# Patient Record
Sex: MALE | Race: WHITE | Employment: OTHER | URBAN - NONMETROPOLITAN AREA
[De-identification: names, ages, dates, MRNs, and addresses within clinical notes are randomized per-mention and may not be internally consistent; named-entity substitution may affect disease eponyms.]

---

## 2017-01-04 ENCOUNTER — OFFICE VISIT (OUTPATIENT)
Dept: CARDIOLOGY | Age: 72
End: 2017-01-04

## 2017-01-04 VITALS
WEIGHT: 240 LBS | HEIGHT: 67 IN | HEART RATE: 55 BPM | SYSTOLIC BLOOD PRESSURE: 128 MMHG | BODY MASS INDEX: 37.67 KG/M2 | DIASTOLIC BLOOD PRESSURE: 82 MMHG

## 2017-01-04 DIAGNOSIS — R06.09 DYSPNEA ON EXERTION: ICD-10-CM

## 2017-01-04 DIAGNOSIS — E78.01 FAMILIAL HYPERCHOLESTEROLEMIA: ICD-10-CM

## 2017-01-04 DIAGNOSIS — I10 ESSENTIAL HYPERTENSION: ICD-10-CM

## 2017-01-04 DIAGNOSIS — I25.10 CORONARY ARTERY DISEASE INVOLVING NATIVE CORONARY ARTERY OF NATIVE HEART WITHOUT ANGINA PECTORIS: Primary | ICD-10-CM

## 2017-01-04 PROCEDURE — 93000 ELECTROCARDIOGRAM COMPLETE: CPT | Performed by: NUCLEAR MEDICINE

## 2017-01-04 PROCEDURE — 99214 OFFICE O/P EST MOD 30 MIN: CPT | Performed by: NUCLEAR MEDICINE

## 2017-01-04 RX ORDER — NABUMETONE 500 MG/1
500 TABLET, FILM COATED ORAL 2 TIMES DAILY
Status: ON HOLD | COMMUNITY
End: 2017-06-20 | Stop reason: ALTCHOICE

## 2017-01-04 RX ORDER — TAMSULOSIN HYDROCHLORIDE 0.4 MG/1
0.4 CAPSULE ORAL DAILY
COMMUNITY

## 2017-01-09 ENCOUNTER — PROCEDURE VISIT (OUTPATIENT)
Dept: CARDIOLOGY | Age: 72
End: 2017-01-09

## 2017-01-09 DIAGNOSIS — I48.0 PAROXYSMAL ATRIAL FIBRILLATION (HCC): ICD-10-CM

## 2017-01-09 DIAGNOSIS — R55 VASOVAGAL SYNCOPE: ICD-10-CM

## 2017-01-09 DIAGNOSIS — R00.1 BRADYCARDIA: Primary | ICD-10-CM

## 2017-01-09 PROCEDURE — 93298 REM INTERROG DEV EVAL SCRMS: CPT | Performed by: NUCLEAR MEDICINE

## 2017-01-12 ENCOUNTER — TELEPHONE (OUTPATIENT)
Dept: CARDIOLOGY | Age: 72
End: 2017-01-12

## 2017-01-13 ENCOUNTER — TELEPHONE (OUTPATIENT)
Dept: CARDIOLOGY | Age: 72
End: 2017-01-13

## 2017-01-26 RX ORDER — VALSARTAN AND HYDROCHLOROTHIAZIDE 160; 12.5 MG/1; MG/1
1 TABLET, FILM COATED ORAL DAILY
Qty: 30 TABLET | Refills: 5 | Status: SHIPPED | OUTPATIENT
Start: 2017-01-26 | End: 2017-06-09 | Stop reason: SDUPTHER

## 2017-02-10 ENCOUNTER — PROCEDURE VISIT (OUTPATIENT)
Dept: CARDIOLOGY | Age: 72
End: 2017-02-10

## 2017-02-10 DIAGNOSIS — Z45.09 ENCOUNTER FOR ELECTRONIC ANALYSIS OF REVEAL EVENT RECORDER: Primary | ICD-10-CM

## 2017-02-10 PROCEDURE — 93298 REM INTERROG DEV EVAL SCRMS: CPT | Performed by: NUCLEAR MEDICINE

## 2017-03-24 ENCOUNTER — PROCEDURE VISIT (OUTPATIENT)
Dept: CARDIOLOGY | Age: 72
End: 2017-03-24

## 2017-03-24 DIAGNOSIS — Z45.09 ENCOUNTER FOR ELECTRONIC ANALYSIS OF REVEAL EVENT RECORDER: Primary | ICD-10-CM

## 2017-03-24 PROCEDURE — 93298 REM INTERROG DEV EVAL SCRMS: CPT | Performed by: NUCLEAR MEDICINE

## 2017-05-26 RX ORDER — POTASSIUM CHLORIDE 1500 MG/1
TABLET, EXTENDED RELEASE ORAL
Qty: 180 TABLET | Refills: 1 | Status: SHIPPED | OUTPATIENT
Start: 2017-05-26 | End: 2017-11-11 | Stop reason: SDUPTHER

## 2017-06-05 ENCOUNTER — TELEPHONE (OUTPATIENT)
Dept: CARDIOLOGY | Age: 72
End: 2017-06-05

## 2017-06-05 DIAGNOSIS — Z45.09 ENCOUNTER FOR ELECTRONIC ANALYSIS OF REVEAL EVENT RECORDER: Primary | ICD-10-CM

## 2017-06-09 RX ORDER — VALSARTAN AND HYDROCHLOROTHIAZIDE 160; 12.5 MG/1; MG/1
1 TABLET, FILM COATED ORAL DAILY
Qty: 30 TABLET | Refills: 5 | Status: SHIPPED | OUTPATIENT
Start: 2017-06-09 | End: 2018-01-05 | Stop reason: SDUPTHER

## 2017-06-27 ENCOUNTER — PROCEDURE VISIT (OUTPATIENT)
Dept: CARDIOLOGY | Age: 72
End: 2017-06-27

## 2017-06-27 DIAGNOSIS — Z45.09 ENCOUNTER FOR ELECTRONIC ANALYSIS OF REVEAL EVENT RECORDER: Primary | ICD-10-CM

## 2017-07-19 ENCOUNTER — TELEPHONE (OUTPATIENT)
Dept: CARDIOLOGY CLINIC | Age: 72
End: 2017-07-19

## 2017-08-31 ENCOUNTER — OFFICE VISIT (OUTPATIENT)
Dept: CARDIOLOGY CLINIC | Age: 72
End: 2017-08-31
Payer: MEDICARE

## 2017-08-31 VITALS
DIASTOLIC BLOOD PRESSURE: 84 MMHG | WEIGHT: 231.1 LBS | HEIGHT: 67 IN | HEART RATE: 75 BPM | BODY MASS INDEX: 36.27 KG/M2 | SYSTOLIC BLOOD PRESSURE: 134 MMHG

## 2017-08-31 DIAGNOSIS — E78.01 FAMILIAL HYPERCHOLESTEROLEMIA: ICD-10-CM

## 2017-08-31 DIAGNOSIS — I25.10 CORONARY ARTERY DISEASE INVOLVING NATIVE CORONARY ARTERY OF NATIVE HEART WITHOUT ANGINA PECTORIS: Primary | ICD-10-CM

## 2017-08-31 DIAGNOSIS — I10 ESSENTIAL HYPERTENSION: ICD-10-CM

## 2017-08-31 PROCEDURE — 99213 OFFICE O/P EST LOW 20 MIN: CPT | Performed by: NUCLEAR MEDICINE

## 2017-08-31 PROCEDURE — 93000 ELECTROCARDIOGRAM COMPLETE: CPT | Performed by: NUCLEAR MEDICINE

## 2017-08-31 RX ORDER — CLOPIDOGREL BISULFATE 75 MG/1
TABLET ORAL
Qty: 90 TABLET | Refills: 1 | Status: SHIPPED | OUTPATIENT
Start: 2017-08-31 | End: 2018-01-03 | Stop reason: SDUPTHER

## 2017-08-31 RX ORDER — SOTALOL HYDROCHLORIDE 80 MG/1
TABLET ORAL
Qty: 180 TABLET | Refills: 1 | Status: SHIPPED | OUTPATIENT
Start: 2017-08-31 | End: 2017-08-31 | Stop reason: SDUPTHER

## 2017-08-31 RX ORDER — PRAVASTATIN SODIUM 80 MG/1
TABLET ORAL
Qty: 90 TABLET | Refills: 1 | Status: SHIPPED | OUTPATIENT
Start: 2017-08-31 | End: 2018-01-03 | Stop reason: SDUPTHER

## 2017-11-13 RX ORDER — POTASSIUM CHLORIDE 1500 MG/1
TABLET, EXTENDED RELEASE ORAL
Qty: 180 TABLET | Refills: 1 | Status: SHIPPED | OUTPATIENT
Start: 2017-11-13 | End: 2018-01-03 | Stop reason: SDUPTHER

## 2018-01-03 ENCOUNTER — OFFICE VISIT (OUTPATIENT)
Dept: CARDIOLOGY CLINIC | Age: 73
End: 2018-01-03
Payer: MEDICARE

## 2018-01-03 VITALS
HEIGHT: 66 IN | SYSTOLIC BLOOD PRESSURE: 122 MMHG | BODY MASS INDEX: 37.13 KG/M2 | HEART RATE: 64 BPM | WEIGHT: 231.04 LBS | DIASTOLIC BLOOD PRESSURE: 84 MMHG

## 2018-01-03 DIAGNOSIS — I25.10 CORONARY ARTERY DISEASE INVOLVING NATIVE CORONARY ARTERY OF NATIVE HEART WITHOUT ANGINA PECTORIS: Primary | ICD-10-CM

## 2018-01-03 DIAGNOSIS — I73.9 PVD (PERIPHERAL VASCULAR DISEASE) (HCC): ICD-10-CM

## 2018-01-03 DIAGNOSIS — E78.01 FAMILIAL HYPERCHOLESTEROLEMIA: ICD-10-CM

## 2018-01-03 DIAGNOSIS — I10 ESSENTIAL HYPERTENSION: ICD-10-CM

## 2018-01-03 PROCEDURE — 99214 OFFICE O/P EST MOD 30 MIN: CPT | Performed by: NUCLEAR MEDICINE

## 2018-01-03 RX ORDER — POTASSIUM CHLORIDE 20 MEQ/1
TABLET, EXTENDED RELEASE ORAL
Qty: 180 TABLET | Refills: 3 | Status: SHIPPED | OUTPATIENT
Start: 2018-01-03 | End: 2019-03-04 | Stop reason: SDUPTHER

## 2018-01-03 RX ORDER — CLOPIDOGREL BISULFATE 75 MG/1
TABLET ORAL
Qty: 90 TABLET | Refills: 3 | Status: SHIPPED | OUTPATIENT
Start: 2018-01-03 | End: 2019-03-04 | Stop reason: SDUPTHER

## 2018-01-03 RX ORDER — PRAVASTATIN SODIUM 80 MG/1
TABLET ORAL
Qty: 90 TABLET | Refills: 3 | Status: SHIPPED | OUTPATIENT
Start: 2018-01-03 | End: 2019-03-04 | Stop reason: SDUPTHER

## 2018-01-03 NOTE — PROGRESS NOTES
kg)   BMI 37.29 kg/m²   General:   Well developed, well nourished  Lungs:    Clear to auscultation  Heart:    Normal S1 S2, Slight murmur. no rubs, no gallops  Abdomen:   Soft, non tender, no organomegalies, positive bowel sounds  Extremities:   No edema, no cyanosis, good peripheral pulses  Neurological:   Awake, alert, oriented. No obvious focal deficits  Musculoskelatal:  No obvious deformities    Assessment:     1. Coronary artery disease involving native coronary artery of native heart without angina pectoris     2. Essential hypertension     3. Familial hypercholesterolemia     possible PVD  Higher risk for CAD  Might need a back surgery   ? ? PVD       Plan:  No Follow-up on file. Consider a follow up stress test if there is a plan for back surgery   Check AMOS   Continue risk factor modification and medical management  Thank you for allowing me to participate in the care of your patient. Please don't hesitate to contact me regarding any further issues related to the patient care      Orders Placed:  No orders of the defined types were placed in this encounter. Medications Prescribed:  No orders of the defined types were placed in this encounter. Discussed use, benefit, and side effects of prescribed medications. All patient questions answered. Pt voiced understanding. Instructed to continue current medications, diet and exercise. Continue risk factor modification and medical management. Patient agreed with treatment plan. Follow up as directed.     Electronically signed by Anand Silverman MD on 1/3/2018 at 11:52 AM

## 2018-01-05 ENCOUNTER — PROCEDURE VISIT (OUTPATIENT)
Dept: CARDIOLOGY CLINIC | Age: 73
End: 2018-01-05

## 2018-01-05 DIAGNOSIS — I73.9 CLAUDICATION (HCC): Primary | ICD-10-CM

## 2018-01-05 RX ORDER — SOTALOL HYDROCHLORIDE 80 MG/1
40 TABLET ORAL 2 TIMES DAILY
Qty: 180 TABLET | Refills: 3 | Status: SHIPPED | OUTPATIENT
Start: 2018-01-05 | End: 2019-03-04 | Stop reason: SDUPTHER

## 2018-01-05 RX ORDER — VALSARTAN AND HYDROCHLOROTHIAZIDE 160; 12.5 MG/1; MG/1
1 TABLET, FILM COATED ORAL DAILY
Qty: 90 TABLET | Refills: 3 | Status: SHIPPED | OUTPATIENT
Start: 2018-01-05 | End: 2018-07-31 | Stop reason: ALTCHOICE

## 2018-01-23 ENCOUNTER — TELEPHONE (OUTPATIENT)
Dept: CARDIOLOGY CLINIC | Age: 73
End: 2018-01-23

## 2018-01-23 DIAGNOSIS — I73.9 PVD (PERIPHERAL VASCULAR DISEASE) (HCC): Primary | ICD-10-CM

## 2018-07-31 RX ORDER — IRBESARTAN AND HYDROCHLOROTHIAZIDE 150; 12.5 MG/1; MG/1
1 TABLET, FILM COATED ORAL DAILY
Qty: 90 TABLET | Refills: 1 | Status: SHIPPED | OUTPATIENT
Start: 2018-07-31 | End: 2018-11-06

## 2018-09-05 ENCOUNTER — OFFICE VISIT (OUTPATIENT)
Dept: CARDIOLOGY CLINIC | Age: 73
End: 2018-09-05
Payer: MEDICARE

## 2018-09-05 VITALS
WEIGHT: 238 LBS | HEART RATE: 86 BPM | HEIGHT: 66 IN | BODY MASS INDEX: 38.25 KG/M2 | DIASTOLIC BLOOD PRESSURE: 68 MMHG | SYSTOLIC BLOOD PRESSURE: 124 MMHG

## 2018-09-05 DIAGNOSIS — I48.0 PAROXYSMAL ATRIAL FIBRILLATION (HCC): Primary | ICD-10-CM

## 2018-09-05 DIAGNOSIS — E78.01 FAMILIAL HYPERCHOLESTEROLEMIA: ICD-10-CM

## 2018-09-05 DIAGNOSIS — I10 ESSENTIAL HYPERTENSION: ICD-10-CM

## 2018-09-05 DIAGNOSIS — I25.10 CORONARY ARTERY DISEASE INVOLVING NATIVE CORONARY ARTERY OF NATIVE HEART WITHOUT ANGINA PECTORIS: ICD-10-CM

## 2018-09-05 PROCEDURE — 93000 ELECTROCARDIOGRAM COMPLETE: CPT | Performed by: NUCLEAR MEDICINE

## 2018-09-05 PROCEDURE — 99213 OFFICE O/P EST LOW 20 MIN: CPT | Performed by: NUCLEAR MEDICINE

## 2018-09-05 NOTE — PROGRESS NOTES
[address] CARDIOLOGY  [place] [address] [address]  [place] [address]  Dept: [phone]  Dept Fax: [phone]  Loc: [phone]    Visit Date: 9/5/2018    Jonathan Caban is a 72 y.o. male who presents today for:  Chief Complaint   Patient presents with    Follow-up    Cardiac Clearance    Hypertension    Coronary Artery Disease    Atrial Fibrillation    Hyperlipidemia   dealing with aches pain   Cant get to do better  Back issues  Limited by that   Does have CAD  Cath 2013  Diffuse CAD  No chest pain   Does have dyspnea   No changes in breathing  Deconditioning     HPI:  HPI  Past Medical History:   Diagnosis Date    Anxiety     Bradycardia 4/8/2016    Heart disease     Hyperlipidemia     Hypertension     Medtronic linq loop recorder 4/8/2016    Paroxysmal atrial fibrillation ([address] 9/23/2015    Spondylolisthesis     Syncopal episodes 9/23/2015      Past Surgical History:   Procedure Laterality Date    ANGIOPLASTY  07' 10'    x5    BACK SURGERY  2008    CHOLECYSTECTOMY      CORONARY ANGIOPLASTY WITH STENT PLACEMENT  9612,0298    EYE SURGERY      EYE SURGERY      HERNIA REPAIR      KNEE ARTHROSCOPY  1990    TONSILLECTOMY      TUR  2015     Family History   Problem Relation Age of Onset    Heart Disease Mother 62        CABG    High Cholesterol Mother     Cancer Mother     Heart Disease Father 61        stents    Cancer Father     Asthma Brother      Social History   Substance Use Topics    Smoking status: Former Smoker     Quit date: 6/27/2001    Smokeless tobacco: Never Used    Alcohol use Yes      Comment: couple beers a night      Current Outpatient Prescriptions   Medication Sig Dispense Refill    irbesartan-hydrochlorothiazide (AVALIDE) 150-12.5 MG per tablet Take 1 tablet by mouth daily 90 tablet 1    sotalol (BETAPACE) 80 MG tablet Take 0.5 tablets by mouth 2 times daily 180 tablet 3    potassium chloride (KLOR-CON M20)

## 2018-10-05 ENCOUNTER — TELEPHONE (OUTPATIENT)
Dept: CARDIOLOGY CLINIC | Age: 73
End: 2018-10-05

## 2018-10-05 NOTE — TELEPHONE ENCOUNTER
Pre op Risk Assessment    Procedure left medial meniscectomy   Physician [facility]   Date of surgery/procedure 10/24/18    Last OV 9/5/18  Last Stress 1/12/17  Last Echo 1/12/17  Last Cath 6/27/13  Last Stent none in chart   Is patient on blood thinners plavix asa  Hold Meds/how many days ?

## 2018-11-06 RX ORDER — LOSARTAN POTASSIUM AND HYDROCHLOROTHIAZIDE 25; 100 MG/1; MG/1
1 TABLET ORAL DAILY
Qty: 30 TABLET | Refills: 2 | Status: SHIPPED | OUTPATIENT
Start: 2018-11-06 | End: 2018-12-26 | Stop reason: SDUPTHER

## 2018-11-06 RX ORDER — LOSARTAN POTASSIUM AND HYDROCHLOROTHIAZIDE 25; 100 MG/1; MG/1
1 TABLET ORAL DAILY
COMMUNITY
End: 2018-11-06 | Stop reason: SDUPTHER

## 2018-12-26 RX ORDER — LOSARTAN POTASSIUM AND HYDROCHLOROTHIAZIDE 25; 100 MG/1; MG/1
1 TABLET ORAL DAILY
Qty: 30 TABLET | Refills: 9 | Status: SHIPPED | OUTPATIENT
Start: 2018-12-26 | End: 2019-03-15 | Stop reason: SDUPTHER

## 2019-03-04 RX ORDER — CLOPIDOGREL BISULFATE 75 MG/1
TABLET ORAL
Qty: 90 TABLET | Refills: 3 | Status: SHIPPED | OUTPATIENT
Start: 2019-03-04 | End: 2019-03-15 | Stop reason: SDUPTHER

## 2019-03-04 RX ORDER — POTASSIUM CHLORIDE 20 MEQ/1
TABLET, EXTENDED RELEASE ORAL
Qty: 180 TABLET | Refills: 3 | Status: SHIPPED | OUTPATIENT
Start: 2019-03-04 | End: 2019-03-15 | Stop reason: SDUPTHER

## 2019-03-04 RX ORDER — SOTALOL HYDROCHLORIDE 80 MG/1
TABLET ORAL
Qty: 90 TABLET | Refills: 7 | Status: SHIPPED | OUTPATIENT
Start: 2019-03-04 | End: 2019-03-15 | Stop reason: SDUPTHER

## 2019-03-04 RX ORDER — PRAVASTATIN SODIUM 80 MG/1
TABLET ORAL
Qty: 90 TABLET | Refills: 3 | Status: SHIPPED | OUTPATIENT
Start: 2019-03-04 | End: 2019-03-15 | Stop reason: SDUPTHER

## 2019-03-15 ENCOUNTER — TELEPHONE (OUTPATIENT)
Dept: CARDIOLOGY CLINIC | Age: 74
End: 2019-03-15

## 2019-03-15 RX ORDER — CLOPIDOGREL BISULFATE 75 MG/1
TABLET ORAL
Qty: 90 TABLET | Refills: 3 | Status: SHIPPED | OUTPATIENT
Start: 2019-03-15 | End: 2020-06-02

## 2019-03-15 RX ORDER — NITROGLYCERIN 0.4 MG/1
0.4 TABLET SUBLINGUAL EVERY 5 MIN PRN
Qty: 25 TABLET | Refills: 1 | Status: SHIPPED | OUTPATIENT
Start: 2019-03-15

## 2019-03-15 RX ORDER — SOTALOL HYDROCHLORIDE 80 MG/1
TABLET ORAL
Qty: 90 TABLET | Refills: 7 | Status: SHIPPED | OUTPATIENT
Start: 2019-03-15 | End: 2020-08-18

## 2019-03-15 RX ORDER — PRAVASTATIN SODIUM 80 MG/1
TABLET ORAL
Qty: 90 TABLET | Refills: 3 | Status: SHIPPED | OUTPATIENT
Start: 2019-03-15 | End: 2020-06-02

## 2019-03-15 RX ORDER — POTASSIUM CHLORIDE 20 MEQ/1
TABLET, EXTENDED RELEASE ORAL
Qty: 180 TABLET | Refills: 3 | Status: SHIPPED | OUTPATIENT
Start: 2019-03-15 | End: 2020-06-02

## 2019-03-15 RX ORDER — LOSARTAN POTASSIUM AND HYDROCHLOROTHIAZIDE 25; 100 MG/1; MG/1
1 TABLET ORAL DAILY
Qty: 30 TABLET | Refills: 9 | Status: SHIPPED | OUTPATIENT
Start: 2019-03-15 | End: 2019-10-14 | Stop reason: ALTCHOICE

## 2019-03-15 RX ORDER — LOSARTAN POTASSIUM AND HYDROCHLOROTHIAZIDE 25; 100 MG/1; MG/1
1 TABLET ORAL DAILY
Qty: 30 TABLET | Refills: 9 | Status: CANCELLED | OUTPATIENT
Start: 2019-03-15

## 2019-09-04 ENCOUNTER — OFFICE VISIT (OUTPATIENT)
Dept: CARDIOLOGY CLINIC | Age: 74
End: 2019-09-04
Payer: MEDICARE

## 2019-09-04 VITALS
SYSTOLIC BLOOD PRESSURE: 114 MMHG | HEIGHT: 66 IN | BODY MASS INDEX: 38.25 KG/M2 | DIASTOLIC BLOOD PRESSURE: 82 MMHG | HEART RATE: 73 BPM | WEIGHT: 238 LBS

## 2019-09-04 DIAGNOSIS — I10 ESSENTIAL HYPERTENSION: ICD-10-CM

## 2019-09-04 DIAGNOSIS — R07.2 PRECORDIAL PAIN: ICD-10-CM

## 2019-09-04 DIAGNOSIS — E78.01 FAMILIAL HYPERCHOLESTEROLEMIA: ICD-10-CM

## 2019-09-04 DIAGNOSIS — I25.10 CORONARY ARTERY DISEASE INVOLVING NATIVE CORONARY ARTERY OF NATIVE HEART WITHOUT ANGINA PECTORIS: Primary | ICD-10-CM

## 2019-09-04 PROCEDURE — 99214 OFFICE O/P EST MOD 30 MIN: CPT | Performed by: NUCLEAR MEDICINE

## 2019-09-04 PROCEDURE — 93000 ELECTROCARDIOGRAM COMPLETE: CPT | Performed by: NUCLEAR MEDICINE

## 2019-09-04 NOTE — PROGRESS NOTES
[address] [address]  Dept: [phone]  Dept Fax: [phone]  Loc: [phone]    Visit Date: 2019    Nichelle Israel is a 69 y.o. male who presents todayfor:  Chief Complaint   Patient presents with    1 Year Follow Up    Coronary Artery Disease    Chest Pain    Hypertension    Hyperlipidemia   not feeling well  Fatigue  Tired  dyspnea  On exertion   Chest pressure  Resting   Not exertional   Radiates to left arm   Does have more dyspnea  Cath 2013   Diffuse CAD  Untreated sleep apnea  Multiple risk factors  BP is stable  Some dizziness  Obesity           HPI:  HPI  Past Medical History:   Diagnosis Date    Anxiety     Bradycardia 2016    Heart disease     Hyperlipidemia     Hypertension     Medtronic linq loop recorder 2016    Neuropathy     Paroxysmal atrial fibrillation ([address] 2015    Spondylolisthesis     Syncopal episodes 2015      Past Surgical History:   Procedure Laterality Date    ANGIOPLASTY  07' 10'    x5    BACK SURGERY      CHOLECYSTECTOMY      CORONARY ANGIOPLASTY WITH STENT PLACEMENT  6099,9861    EYE SURGERY      EYE SURGERY      HERNIA REPAIR      KNEE ARTHROSCOPY      SHOULDER ARTHROSCOPY      TONSILLECTOMY      TURP       Family History   Problem Relation Age of Onset    Heart Disease Mother 62        CABG    High Cholesterol Mother     Cancer Mother     Heart Disease Father 61        stents    Cancer Father     Asthma Brother      Social History     Tobacco Use    Smoking status: Former Smoker     Last attempt to quit: 2001     Years since quittin.2    Smokeless tobacco: Never Used   Substance Use Topics    Alcohol use: Yes     Comment: couple beers a night      Current Outpatient Medications   Medication Sig Dispense Refill    Cyanocobalamin (VITAMIN B 12 PO) Take 1,000 mg by mouth daily      sotalol (BETAPACE) 80 MG Prescribed:  No orders of the defined types were placed in this encounter. Discussed use, benefit, and side effects of prescribed medications. All patient questions answered. Pt voicedunderstanding. Instructed to continue current medications, diet and exercise. Continue risk factor modification and medical management. Patient agreed with treatment plan. Follow up as directed.     Electronically signedby Laisha Travis MD on 9/4/2019 at 1:27 PM

## 2019-09-13 ENCOUNTER — HOSPITAL ENCOUNTER (OUTPATIENT)
Dept: NON INVASIVE DIAGNOSTICS | Age: 74
Discharge: HOME | End: 2019-09-13
Payer: MEDICARE

## 2019-09-13 VITALS — BODY MASS INDEX: 38.25 KG/M2 | WEIGHT: 238 LBS | HEIGHT: 66 IN

## 2019-09-13 DIAGNOSIS — I25.10 CORONARY ARTERY DISEASE INVOLVING NATIVE CORONARY ARTERY OF NATIVE HEART WITHOUT ANGINA PECTORIS: ICD-10-CM

## 2019-09-13 DIAGNOSIS — I10 ESSENTIAL HYPERTENSION: ICD-10-CM

## 2019-09-13 DIAGNOSIS — R07.2 PRECORDIAL PAIN: ICD-10-CM

## 2019-09-13 DIAGNOSIS — E78.01 FAMILIAL HYPERCHOLESTEROLEMIA: ICD-10-CM

## 2019-09-13 LAB
LV EF: 55 %
LVEF MODALITY: NORMAL

## 2019-09-13 PROCEDURE — 2709999900 HC NON-CHARGEABLE SUPPLY

## 2019-09-13 PROCEDURE — 3430000000 HC RX DIAGNOSTIC RADIOPHARMACEUTICAL: Performed by: NUCLEAR MEDICINE

## 2019-09-13 PROCEDURE — 93017 CV STRESS TEST TRACING ONLY: CPT | Performed by: NUCLEAR MEDICINE

## 2019-09-13 PROCEDURE — 93306 TTE W/DOPPLER COMPLETE: CPT | Performed by: NUCLEAR MEDICINE

## 2019-09-13 PROCEDURE — 93306 TTE W/DOPPLER COMPLETE: CPT

## 2019-09-13 PROCEDURE — 6360000002 HC RX W HCPCS

## 2019-09-13 PROCEDURE — 78452 HT MUSCLE IMAGE SPECT MULT: CPT

## 2019-09-13 PROCEDURE — A9500 TC99M SESTAMIBI: HCPCS | Performed by: NUCLEAR MEDICINE

## 2019-09-13 RX ADMIN — Medication 30 MILLICURIE: at 12:50

## 2019-09-13 RX ADMIN — Medication 10.9 MILLICURIE: at 11:40

## 2019-09-19 ENCOUNTER — TELEPHONE (OUTPATIENT)
Dept: CARDIOLOGY CLINIC | Age: 74
End: 2019-09-19

## 2019-10-14 RX ORDER — IRBESARTAN AND HYDROCHLOROTHIAZIDE 300; 12.5 MG/1; MG/1
1 TABLET, FILM COATED ORAL DAILY
Qty: 90 TABLET | Refills: 3 | Status: SHIPPED | OUTPATIENT
Start: 2019-10-14 | End: 2020-07-14

## 2019-10-14 RX ORDER — IRBESARTAN AND HYDROCHLOROTHIAZIDE 300; 12.5 MG/1; MG/1
1 TABLET, FILM COATED ORAL DAILY
COMMUNITY
End: 2019-10-14 | Stop reason: SDUPTHER

## 2019-11-14 ENCOUNTER — INITIAL CONSULT (OUTPATIENT)
Dept: PULMONOLOGY | Age: 74
End: 2019-11-14
Payer: MEDICARE

## 2019-11-14 VITALS
SYSTOLIC BLOOD PRESSURE: 120 MMHG | HEART RATE: 76 BPM | HEIGHT: 66 IN | DIASTOLIC BLOOD PRESSURE: 78 MMHG | OXYGEN SATURATION: 96 % | WEIGHT: 241.6 LBS | BODY MASS INDEX: 38.83 KG/M2

## 2019-11-14 DIAGNOSIS — G47.30 SLEEP APNEA, UNSPECIFIED TYPE: ICD-10-CM

## 2019-11-14 DIAGNOSIS — G47.00 INSOMNIA, UNSPECIFIED TYPE: ICD-10-CM

## 2019-11-14 DIAGNOSIS — R06.83 SNORING: Primary | ICD-10-CM

## 2019-11-14 DIAGNOSIS — G47.10 HYPERSOMNIA: ICD-10-CM

## 2019-11-14 PROCEDURE — 99204 OFFICE O/P NEW MOD 45 MIN: CPT | Performed by: INTERNAL MEDICINE

## 2019-11-14 RX ORDER — LOSARTAN POTASSIUM AND HYDROCHLOROTHIAZIDE 25; 100 MG/1; MG/1
1 TABLET ORAL DAILY
COMMUNITY
End: 2020-06-02

## 2019-11-14 RX ORDER — LANOLIN ALCOHOL/MO/W.PET/CERES
3 CREAM (GRAM) TOPICAL NIGHTLY PRN
Qty: 30 TABLET | Refills: 11 | Status: SHIPPED | OUTPATIENT
Start: 2019-11-14 | End: 2020-10-21

## 2019-12-09 ENCOUNTER — TELEPHONE (OUTPATIENT)
Dept: PULMONOLOGY | Age: 74
End: 2019-12-09

## 2020-02-17 RX ORDER — IRBESARTAN AND HYDROCHLOROTHIAZIDE 150; 12.5 MG/1; MG/1
1 TABLET, FILM COATED ORAL DAILY
Qty: 90 TABLET | Refills: 0 | Status: SHIPPED | OUTPATIENT
Start: 2020-02-17 | End: 2020-06-02

## 2020-02-17 NOTE — TELEPHONE ENCOUNTER
Yoly Field called requesting a refill on the following medications:  Requested Prescriptions     Pending Prescriptions Disp Refills    irbesartan-hydrochlorothiazide (AVALIDE) 150-12.5 MG per tablet 90 tablet 1     Sig: Take 1 tablet by mouth daily     Pharmacy verified: [id number] in [place], [place]  . pv      Date of last visit: 9/04/19  Date of next visit (if applicable): 2/7/1994

## 2020-03-05 ENCOUNTER — TELEPHONE (OUTPATIENT)
Dept: CARDIOLOGY CLINIC | Age: 75
End: 2020-03-05

## 2020-06-02 RX ORDER — PRAVASTATIN SODIUM 80 MG/1
TABLET ORAL
Qty: 90 TABLET | Refills: 0 | Status: SHIPPED | OUTPATIENT
Start: 2020-06-02 | End: 2020-06-09 | Stop reason: SDUPTHER

## 2020-06-02 RX ORDER — POTASSIUM CHLORIDE 20 MEQ/1
TABLET, EXTENDED RELEASE ORAL
Qty: 180 TABLET | Refills: 0 | Status: SHIPPED | OUTPATIENT
Start: 2020-06-02 | End: 2020-06-09 | Stop reason: SDUPTHER

## 2020-06-02 RX ORDER — CLOPIDOGREL BISULFATE 75 MG/1
TABLET ORAL
Qty: 90 TABLET | Refills: 0 | Status: SHIPPED | OUTPATIENT
Start: 2020-06-02 | End: 2020-06-09 | Stop reason: SDUPTHER

## 2020-06-02 RX ORDER — IRBESARTAN AND HYDROCHLOROTHIAZIDE 150; 12.5 MG/1; MG/1
TABLET, FILM COATED ORAL
Qty: 90 TABLET | Refills: 0 | Status: SHIPPED | OUTPATIENT
Start: 2020-06-02 | End: 2020-06-09 | Stop reason: SDUPTHER

## 2020-06-09 RX ORDER — POTASSIUM CHLORIDE 20 MEQ/1
20 TABLET, EXTENDED RELEASE ORAL 2 TIMES DAILY
Qty: 90 TABLET | Refills: 0 | Status: SHIPPED | OUTPATIENT
Start: 2020-06-09 | End: 2020-08-18

## 2020-06-09 RX ORDER — PRAVASTATIN SODIUM 80 MG/1
TABLET ORAL
Qty: 90 TABLET | Refills: 0 | Status: SHIPPED | OUTPATIENT
Start: 2020-06-09 | End: 2020-08-18

## 2020-06-09 RX ORDER — CLOPIDOGREL BISULFATE 75 MG/1
TABLET ORAL
Qty: 90 TABLET | Refills: 0 | Status: SHIPPED | OUTPATIENT
Start: 2020-06-09 | End: 2020-08-18

## 2020-06-09 RX ORDER — IRBESARTAN AND HYDROCHLOROTHIAZIDE 150; 12.5 MG/1; MG/1
TABLET, FILM COATED ORAL
Qty: 90 TABLET | Refills: 0 | Status: SHIPPED | OUTPATIENT
Start: 2020-06-09 | End: 2020-08-18

## 2020-06-09 NOTE — TELEPHONE ENCOUNTER
Patient partner [facility]i [facility] calling in for patient who has an appt with Dr Faustina Joshi 7/31/2020. She thinks he may be due for some blood work prior to that appt and would like an order faxed to their pcp office in [place]. She only had a phone number [phone]. She also said his prescriptions that were sent on 6/2/2020 to [facility] in [place] were not received. Please resend to pharmacy.

## 2020-07-14 ENCOUNTER — OFFICE VISIT (OUTPATIENT)
Dept: CARDIOLOGY CLINIC | Age: 75
End: 2020-07-14
Payer: MEDICARE

## 2020-07-14 VITALS
HEART RATE: 68 BPM | SYSTOLIC BLOOD PRESSURE: 130 MMHG | BODY MASS INDEX: 38.44 KG/M2 | DIASTOLIC BLOOD PRESSURE: 80 MMHG | HEIGHT: 66 IN | WEIGHT: 239.2 LBS

## 2020-07-14 PROCEDURE — 99213 OFFICE O/P EST LOW 20 MIN: CPT | Performed by: NURSE PRACTITIONER

## 2020-07-14 RX ORDER — PREGABALIN 50 MG/1
50 CAPSULE ORAL 3 TIMES DAILY
COMMUNITY

## 2020-07-14 RX ORDER — ISOSORBIDE MONONITRATE 30 MG/1
30 TABLET, EXTENDED RELEASE ORAL DAILY
Qty: 90 TABLET | Refills: 3 | Status: SHIPPED | OUTPATIENT
Start: 2020-07-14 | End: 2020-10-21

## 2020-07-14 NOTE — PROGRESS NOTES
[facility] PROFESSIONAL SERVICES  [facility] [address]   [address] [address]   Dept: [phone]   Dept Fax: [phone]   Loc: [phone]      Chief Complaint   Patient presents with    Follow-up     F/U office visit in 69 y/o male with history of CAD and prior stenting, HTN, HLP, MARTA on CPAP. Reports doing fairly well over last few months. Has had a couple of brief episodes of chest discomfort and sob at rest that resolved quickly on its own. Has been active and exerted himself without evoking chest discomfort or sob. Denies recent chest pain, palpitations, sob, JOSE R, lightheadedness, dizziness or syncope. Cardiologist:  Dr. Claire Stringer:   No fever, no chills, No fatigue or weight loss  Pulmonary:    No dyspnea, no wheezing  Cardiac:    Denies recent chest pain   GI:     No nausea or vomiting, no abdominal pain  Neuro:    No dizziness or light headedness  Musculoskeletal:  No recent active issues  Extremities:   No edema, good peripheral pulses      Past Medical History:   Diagnosis Date    Anxiety     Bradycardia 4/8/2016    Heart disease     Hyperlipidemia     Hypertension     Medtronic linq loop recorder 4/8/2016    Neuropathy     Paroxysmal atrial fibrillation (H[facility]) 9/23/2015    Spondylolisthesis     Syncopal episodes 9/23/2015       No Known Allergies    Current Outpatient Medications   Medication Sig Dispense Refill    pregabalin (LYRICA) 50 MG capsule Take 50 mg by mouth 3 times daily.       isosorbide mononitrate (IMDUR) 30 MG extended release tablet Take 1 tablet by mouth daily 90 tablet 3    pravastatin (PRAVACHOL) 80 MG tablet Take 1 tablet by mouth once daily 90 tablet 0    potassium chloride (KLOR-CON M) 20 MEQ extended release tablet Take 1 tablet by mouth 2 times daily 90 tablet 0    irbesartan-hydrochlorothiazide (AVALIDE) 150-12.5 MG per tablet Take 1 tablet by mouth once daily 90 tablet 0    clopidogrel (PLAVIX) 75 MG tablet Take 1 tablet by mouth once daily 90 tablet 0    melatonin 3 MG TABS tablet Take 1 tablet by mouth nightly as needed (Insomnia) 30 tablet 11    Cyanocobalamin (VITAMIN B 12 PO) Take 1,000 mg by mouth daily      sotalol (BETAPACE) 80 MG tablet TAKE ONE-HALF TABLET BY MOUTH TWICE DAILY 90 tablet 7    nitroGLYCERIN (NITROSTAT) 0.4 MG SL tablet Place 1 tablet under the tongue every 5 minutes as needed (PRN) 25 tablet 1    DULoxetine (CYMBALTA) 30 MG extended release capsule Take 60 mg by mouth daily       oxybutynin (DITROPAN) 5 MG tablet Take 5 mg by mouth nightly      tamsulosin (FLOMAX) 0.4 MG capsule Take 0.4 mg by mouth daily      aspirin 81 MG tablet Take 81 mg by mouth 2 times daily      rOPINIRole (REQUIP) 0.5 MG tablet Take 0.5 mg by mouth as needed (restless leg syndrome)       esomeprazole (NEXIUM) 40 MG capsule Take 40 mg by mouth daily       tizanidine (ZANAFLEX) 4 MG tablet Take 4 mg by mouth every 6 hours as needed.  fish oil-omega-3 fatty acids 1000 MG capsule Take 1 g by mouth daily        No current facility-administered medications for this visit.         Social History     Socioeconomic History    Marital status: Life Partner     Spouse name: None    Number of children: None    Years of education: None    Highest education level: None   Occupational History    None   Social Needs    Financial resource strain: None    Food insecurity     Worry: None     Inability: None    Transportation needs     Medical: None     Non-medical: None   Tobacco Use    Smoking status: Former Smoker     Packs/day: 5.00     Years: 15.00     Pack years: 75.00     Types: Cigarettes     Last attempt to quit: 2000     Years since quittin.4    Smokeless tobacco: Never Used   Substance and Sexual Activity    Alcohol use: Yes     Comment: couple beers a night    Drug use: No    Sexual activity: None   Lifestyle    Physical activity     Days per week: None     Minutes per session: None

## 2020-08-18 RX ORDER — POTASSIUM CHLORIDE 20 MEQ/1
TABLET, EXTENDED RELEASE ORAL
Qty: 180 TABLET | Refills: 1 | Status: SHIPPED | OUTPATIENT
Start: 2020-08-18 | End: 2021-06-18 | Stop reason: SDUPTHER

## 2020-08-18 RX ORDER — IRBESARTAN AND HYDROCHLOROTHIAZIDE 150; 12.5 MG/1; MG/1
TABLET, FILM COATED ORAL
Qty: 90 TABLET | Refills: 1 | Status: SHIPPED | OUTPATIENT
Start: 2020-08-18 | End: 2021-02-26 | Stop reason: SDUPTHER

## 2020-08-18 RX ORDER — SOTALOL HYDROCHLORIDE 80 MG/1
TABLET ORAL
Qty: 90 TABLET | Refills: 1 | Status: SHIPPED | OUTPATIENT
Start: 2020-08-18 | End: 2021-02-26 | Stop reason: SDUPTHER

## 2020-08-18 RX ORDER — PRAVASTATIN SODIUM 80 MG/1
TABLET ORAL
Qty: 90 TABLET | Refills: 1 | Status: SHIPPED | OUTPATIENT
Start: 2020-08-18 | End: 2021-02-26 | Stop reason: SDUPTHER

## 2020-08-18 RX ORDER — CLOPIDOGREL BISULFATE 75 MG/1
TABLET ORAL
Qty: 90 TABLET | Refills: 1 | Status: SHIPPED | OUTPATIENT
Start: 2020-08-18 | End: 2021-02-26 | Stop reason: SDUPTHER

## 2020-09-09 ENCOUNTER — TELEPHONE (OUTPATIENT)
Dept: CARDIOLOGY CLINIC | Age: 75
End: 2020-09-09

## 2020-09-10 NOTE — TELEPHONE ENCOUNTER
Pt wife states pt has not taken the imdur for several weeks since it was not helping him, he is not having chest pain. , and is feeling ok.  Form filled out and out for edgar to sign

## 2020-10-21 ENCOUNTER — OFFICE VISIT (OUTPATIENT)
Dept: CARDIOLOGY CLINIC | Age: 75
End: 2020-10-21
Payer: MEDICARE

## 2020-10-21 VITALS
HEART RATE: 70 BPM | DIASTOLIC BLOOD PRESSURE: 72 MMHG | BODY MASS INDEX: 37.61 KG/M2 | HEIGHT: 66 IN | SYSTOLIC BLOOD PRESSURE: 118 MMHG | WEIGHT: 234 LBS

## 2020-10-21 PROCEDURE — 99214 OFFICE O/P EST MOD 30 MIN: CPT | Performed by: NUCLEAR MEDICINE

## 2020-10-21 PROCEDURE — 93000 ELECTROCARDIOGRAM COMPLETE: CPT | Performed by: NUCLEAR MEDICINE

## 2020-10-21 RX ORDER — TADALAFIL 10 MG/1
10 TABLET ORAL DAILY PRN
Qty: 90 TABLET | Refills: 1 | Status: SHIPPED | OUTPATIENT
Start: 2020-10-21

## 2020-10-21 NOTE — PROGRESS NOTES
[address] [facility],[address] [address]  Dept: [phone]  Dept Fax: [phone]  Loc: [phone]    Visit Date: 10/21/2020    Rosmery Juárez is a 70 y.o. male who presents todayfor:  Chief Complaint   Patient presents with    3 Month Follow-Up    Coronary Artery Disease    Chest Pain    Hypertension     Some anal pain at times at night   Intermittent in nature   Known CAD  Previous stents  Atypical chest pain  Know risk for CAD  Bp is stable   Lots of back issues  Very limited by the back   Some issues with ED  Discussed at length   HPI:  HPI  Past Medical History:   Diagnosis Date    Anxiety     Bradycardia 2016    Heart disease     Hyperlipidemia     Hypertension     Medtronic linq loop recorder 2016    Neuropathy     Paroxysmal atrial fibrillation ([address] 2015    Spondylolisthesis     Syncopal episodes 2015      Past Surgical History:   Procedure Laterality Date    ANGIOPLASTY  07' 10'    x5    BACK SURGERY      CHOLECYSTECTOMY      CORONARY ANGIOPLASTY WITH STENT PLACEMENT  3882,6844    EYE SURGERY      EYE SURGERY      HERNIA REPAIR      KNEE ARTHROSCOPY      SHOULDER ARTHROSCOPY      TONSILLECTOMY      TURP  2015     Family History   Problem Relation Age of Onset    Heart Disease Mother 62        CABG    High Cholesterol Mother     Cancer Mother     Heart Disease Father 61        stents   Blase Liming Cancer Father     Asthma Brother      Social History     Tobacco Use    Smoking status: Former Smoker     Packs/day: 5.00     Years: 15.00     Pack years: 75.00     Types: Cigarettes     Last attempt to quit: 2000     Years since quittin.7    Smokeless tobacco: Never Used   Substance Use Topics    Alcohol use: Yes     Comment: couple beers a night      Current Outpatient Medications   Medication Sig Dispense Refill    pravastatin (PRAVACHOL) 80 MG tablet Take 1 tablet by mouth once daily 90 tablet 1    irbesartan-hydrochlorothiazide (AVALIDE) 150-12.5 MG per tablet Take 1 tablet by mouth once daily 90 tablet 1    potassium chloride (KLOR-CON M) 20 MEQ extended release tablet Take 1 tablet by mouth twice daily 180 tablet 1    sotalol (BETAPACE) 80 MG tablet Take 1/2 (one-half) tablet by mouth twice daily 90 tablet 1    clopidogrel (PLAVIX) 75 MG tablet Take 1 tablet by mouth once daily 90 tablet 1    pregabalin (LYRICA) 50 MG capsule Take 50 mg by mouth 3 times daily.  Cyanocobalamin (VITAMIN B 12 PO) Take 1,000 mg by mouth daily      nitroGLYCERIN (NITROSTAT) 0.4 MG SL tablet Place 1 tablet under the tongue every 5 minutes as needed (PRN) 25 tablet 1    DULoxetine (CYMBALTA) 30 MG extended release capsule Take 60 mg by mouth daily       oxybutynin (DITROPAN) 5 MG tablet Take 5 mg by mouth nightly      tamsulosin (FLOMAX) 0.4 MG capsule Take 0.4 mg by mouth daily      aspirin 81 MG tablet Take 81 mg by mouth 2 times daily      rOPINIRole (REQUIP) 0.5 MG tablet Take 0.5 mg by mouth as needed (restless leg syndrome)       esomeprazole (NEXIUM) 40 MG capsule Take 40 mg by mouth daily       tizanidine (ZANAFLEX) 4 MG tablet Take 4 mg by mouth every 6 hours as needed.  fish oil-omega-3 fatty acids 1000 MG capsule Take 1 g by mouth daily        No current facility-administered medications for this visit.       No Known Allergies  Health Maintenance   Topic Date Due    AAA screen  1945    Hepatitis C screen  1945    DTaP/Tdap/Td vaccine (1 - Tdap) 03/15/1966    Shingles Vaccine (1 of 2) 03/15/1997    Colon cancer screen colonoscopy  03/15/1997    Pneumococcal 65+ years Vaccine (1 of 1 - PPSV23) 03/15/2012    Lipid screen  06/27/2014    Potassium monitoring  09/23/2016    Creatinine monitoring  09/23/2016    Annual Wellness Visit (AWV)  03/29/2020    Flu vaccine (1) 09/01/2020    Hepatitis A vaccine  Aged Out    Hepatitis B vaccine  Aged Out    Hib vaccine  Aged Out    Meningococcal (ACWY) vaccine  Aged Out       Subjective:  Review of Systems  General:   No fever, no chills, No fatigue or weight loss  Pulmonary:    some dyspnea, no wheezing  Cardiac:    Denies recent chest pain,   GI:     No nausea or vomiting, no abdominal pain  Neuro:     No dizziness or light headedness,   Musculoskeletal:  No recent active issues  Extremities:   No edema, no obvious claudication       Objective:  Physical Exam  /72   Pulse 70   Ht 5' 6\" (1.676 m)   Wt 234 lb (106.1 kg)   BMI 37.77 kg/m²   General:   Well developed, well nourished  Lungs:    Clear to auscultation  Heart:    Normal S1 S2, Slight murmur. no rubs, no gallops  Abdomen:   Soft, non tender, no organomegalies, positive bowel sounds  Extremities:   No edema, no cyanosis, good peripheral pulses  Neurological:   Awake, alert, oriented. No obvious focal deficits  Musculoskelatal:  No obvious deformities    Assessment:      Diagnosis Orders   1. Coronary artery disease involving native coronary artery of native heart without angina pectoris  EKG 12 lead   2. Essential hypertension  EKG 12 lead   3. Mixed hyperlipidemia  EKG 12 lead   as above  Cardiac fair for now   ECG in office was done today. I reviewed the ECG. No acute findings      Plan:  No follow-ups on file. As above  Follow up by PCP for his anal issue  Likely back related   Okay for Cialis   No nitrates   Continue risk factor modification and medical management  Thank you for allowing me to participate in the care of your patient. Please don't hesitate to contact me regarding any further issues related to the patient care    Orders Placed:  Orders Placed This Encounter   Procedures    EKG 12 lead     Order Specific Question:   Reason for Exam?     Answer: Other       Medications Prescribed:  No orders of the defined types were placed in this encounter. Discussed use, benefit, and side effects of prescribed medications.  All patient questions answered. Pt voicedunderstanding. Instructed to continue current medications, diet and exercise. Continue risk factor modification and medical management. Patient agreed with treatment plan. Follow up as directed.     Electronically signedby Brittni Koroma MD on 10/21/2020 at 2:08 PM

## 2021-02-26 RX ORDER — SOTALOL HYDROCHLORIDE 80 MG/1
TABLET ORAL
Qty: 90 TABLET | Refills: 1 | Status: SHIPPED | OUTPATIENT
Start: 2021-02-26 | End: 2021-05-25 | Stop reason: SDUPTHER

## 2021-02-26 RX ORDER — CLOPIDOGREL BISULFATE 75 MG/1
TABLET ORAL
Qty: 90 TABLET | Refills: 2 | Status: SHIPPED | OUTPATIENT
Start: 2021-02-26 | End: 2021-11-15

## 2021-02-26 RX ORDER — IRBESARTAN AND HYDROCHLOROTHIAZIDE 150; 12.5 MG/1; MG/1
TABLET, FILM COATED ORAL
Qty: 90 TABLET | Refills: 2 | Status: SHIPPED | OUTPATIENT
Start: 2021-02-26 | End: 2021-11-22 | Stop reason: SDUPTHER

## 2021-02-26 RX ORDER — PRAVASTATIN SODIUM 80 MG/1
TABLET ORAL
Qty: 90 TABLET | Refills: 2 | Status: SHIPPED | OUTPATIENT
Start: 2021-02-26 | End: 2021-11-15

## 2021-02-26 NOTE — TELEPHONE ENCOUNTER
Chasity Alexander called requesting a refill on the following medications:  Requested Prescriptions     Pending Prescriptions Disp Refills    pravastatin (PRAVACHOL) 80 MG tablet 90 tablet 1    sotalol (BETAPACE) 80 MG tablet 90 tablet 1     Sig: Take 1/2 (one-half) tablet by mouth twice daily    irbesartan-hydroCHLOROthiazide (AVALIDE) 150-12.5 MG per tablet 90 tablet 1    clopidogrel (PLAVIX) 75 MG tablet 90 tablet 1     Pharmacy verified:  .pv  [facility]    Date of last visit: 10/21/20  Date of next visit (if applicable): 10/32/91

## 2021-05-25 RX ORDER — SOTALOL HYDROCHLORIDE 80 MG/1
TABLET ORAL
Qty: 90 TABLET | Refills: 1 | Status: SHIPPED | OUTPATIENT
Start: 2021-05-25 | End: 2021-11-19 | Stop reason: SDUPTHER

## 2021-06-18 RX ORDER — POTASSIUM CHLORIDE 20 MEQ/1
TABLET, EXTENDED RELEASE ORAL
Qty: 180 TABLET | Refills: 1 | OUTPATIENT
Start: 2021-06-18 | End: 2021-11-15

## 2021-06-18 NOTE — TELEPHONE ENCOUNTER
Nestor Rizzo notified medication will be called in to [facility].    Spoke to [facility].

## 2021-06-18 NOTE — TELEPHONE ENCOUNTER
Patients wife Eboni Varela (on [facility]) calling in for patient regarding his potassium chloride refill request that was refused. He has been out of medication and the pharmacy [facility] in [place] does not have any refills for him. Please call Eboni Varela back to advise.

## 2021-10-08 ENCOUNTER — TELEPHONE (OUTPATIENT)
Dept: CARDIOLOGY CLINIC | Age: 76
End: 2021-10-08

## 2021-10-08 NOTE — TELEPHONE ENCOUNTER
[address] [place] [address] [facility] is requesting the EKG results and last ov notes from 10/21/2020.     Request for pre op clearance:  Requested by: Dr. Steph Rushing  Date of surgery 10/18/2021 (pending clearance)  Procedure right knee video arthroscopy with menisectomy  Office phone # [phone]  Fax #  [phone]    Date of last visit with cardiologist: 10/21/2020  DONV 10/25/2021

## 2021-10-08 NOTE — TELEPHONE ENCOUNTER
Pre op Risk Assessment  Can pt be cleared before his appt with you? If not we need to let [place] know.      Procedure right knee scope  Physician   Date of surgery/procedure 10/18/2021    Last OV 10/21/2020  Last Stress 9-  Last Echo 9-13-19  Last Cath 6-21-17  Last Stent   Is patient on blood thinners plavix and asa  Hold Meds/how many days

## 2021-10-11 NOTE — TELEPHONE ENCOUNTER
Patient's wife is on the phone asking about the patient being seen before 10/18/2021 to get clearance for his surgery. Is patient able to be seen this week? His wife said that they can come any time but need an hour for driving time.

## 2021-10-13 ENCOUNTER — OFFICE VISIT (OUTPATIENT)
Dept: CARDIOLOGY CLINIC | Age: 76
End: 2021-10-13
Payer: MEDICARE

## 2021-10-13 VITALS
HEART RATE: 74 BPM | HEIGHT: 66 IN | DIASTOLIC BLOOD PRESSURE: 98 MMHG | SYSTOLIC BLOOD PRESSURE: 142 MMHG | BODY MASS INDEX: 37.77 KG/M2

## 2021-10-13 DIAGNOSIS — Z01.818 PREOPERATIVE CLEARANCE: ICD-10-CM

## 2021-10-13 DIAGNOSIS — E78.01 FAMILIAL HYPERCHOLESTEROLEMIA: ICD-10-CM

## 2021-10-13 DIAGNOSIS — I25.10 CORONARY ARTERY DISEASE INVOLVING NATIVE CORONARY ARTERY OF NATIVE HEART WITHOUT ANGINA PECTORIS: Primary | ICD-10-CM

## 2021-10-13 DIAGNOSIS — I10 PRIMARY HYPERTENSION: ICD-10-CM

## 2021-10-13 PROCEDURE — 93000 ELECTROCARDIOGRAM COMPLETE: CPT | Performed by: NUCLEAR MEDICINE

## 2021-10-13 PROCEDURE — 99214 OFFICE O/P EST MOD 30 MIN: CPT | Performed by: NUCLEAR MEDICINE

## 2021-10-13 NOTE — PROGRESS NOTES
[id number] [facility] .  [address] [place] [address]  Dept: [phone]  Dept Fax: [phone]  Loc: [phone]    Visit Date: 10/13/2021    Demetria Schmid is a 78 y.o. male who presents todayfor:  Chief Complaint   Patient presents with    Follow-up    Coronary Artery Disease    Hypertension    Hyperlipidemia     Know stents   Going for knees surgery   Stress test    No ischemia then   No chest pain   No changes in breathing  Limited by the knees  Used to smoke  Does have dyspnea on exertion '  Poor functional capacity   BP is stable   No dizziness  No syncope      HPI:  HPI  Past Medical History:   Diagnosis Date    Anxiety     Bradycardia 2016    Heart disease     Hyperlipidemia     Hypertension     Medtronic linq loop recorder 2016    Neuropathy     Paroxysmal atrial fibrillation ([address] 2015    Spondylolisthesis     Syncopal episodes 2015      Past Surgical History:   Procedure Laterality Date    ANGIOPLASTY  07' 10'    x5    BACK SURGERY      CATARACT REMOVAL  2021    CHOLECYSTECTOMY      CORONARY ANGIOPLASTY WITH STENT PLACEMENT  ,    EYE SURGERY      EYE SURGERY      HERNIA REPAIR      KNEE ARTHROSCOPY      SHOULDER ARTHROSCOPY      TONSILLECTOMY      TURP       Family History   Problem Relation Age of Onset    Heart Disease Mother 62        CABG    High Cholesterol Mother     Cancer Mother     Heart Disease Father 61        stents   James Cancer Father     Asthma Brother      Social History     Tobacco Use    Smoking status: Former Smoker     Packs/day: 5.00     Years: 15.00     Pack years: 75.00     Types: Cigarettes     Quit date: 2000     Years since quittin.7    Smokeless tobacco: Never Used   Substance Use Topics    Alcohol use: Yes     Comment: couple beers a night      Current Outpatient Medications   Medication Sig Dispense Refill    potassium chloride (KLOR-CON M) 20 MEQ extended release tablet Take 1 tablet by mouth twice daily 180 tablet 1    sotalol (BETAPACE) 80 MG tablet Take 1/2 (one-half) tablet by mouth twice daily 90 tablet 1    pravastatin (PRAVACHOL) 80 MG tablet Take 1 tablet once a day 90 tablet 2    irbesartan-hydroCHLOROthiazide (AVALIDE) 150-12.5 MG per tablet Take 1 tablet once a day 90 tablet 2    clopidogrel (PLAVIX) 75 MG tablet Take 1 tablet once a day 90 tablet 2    tadalafil (CIALIS) 10 MG tablet Take 1 tablet by mouth daily as needed for Erectile Dysfunction 90 tablet 1    pregabalin (LYRICA) 50 MG capsule Take 50 mg by mouth 3 times daily.  Cyanocobalamin (VITAMIN B 12 PO) Take 1,000 mg by mouth daily      nitroGLYCERIN (NITROSTAT) 0.4 MG SL tablet Place 1 tablet under the tongue every 5 minutes as needed (PRN) 25 tablet 1    DULoxetine (CYMBALTA) 30 MG extended release capsule Take 60 mg by mouth daily       oxybutynin (DITROPAN) 5 MG tablet Take 5 mg by mouth nightly      tamsulosin (FLOMAX) 0.4 MG capsule Take 0.4 mg by mouth daily      aspirin 81 MG tablet Take 81 mg by mouth 2 times daily      rOPINIRole (REQUIP) 0.5 MG tablet Take 0.5 mg by mouth as needed (restless leg syndrome)       esomeprazole (NEXIUM) 40 MG capsule Take 40 mg by mouth daily       tizanidine (ZANAFLEX) 4 MG tablet Take 4 mg by mouth every 6 hours as needed.  fish oil-omega-3 fatty acids 1000 MG capsule Take 1 g by mouth daily        No current facility-administered medications for this visit.      No Known Allergies  Health Maintenance   Topic Date Due    AAA screen  Never done    Hepatitis C screen  Never done    DTaP/Tdap/Td vaccine (1 - Tdap) Never done    Shingles Vaccine (1 of 2) Never done    Lipid screen  06/27/2014    Potassium monitoring  09/23/2016    Creatinine monitoring  09/23/2016    Annual Wellness Visit (AWV)  Never done    Flu vaccine (1) Never done    Colon cancer screen colonoscopy 12/06/2023    Pneumococcal 65+ years Vaccine  Completed    COVID-19 Vaccine  Completed    Hepatitis A vaccine  Aged Out    Hepatitis B vaccine  Aged Out    Hib vaccine  Aged Out    Meningococcal (ACWY) vaccine  Aged Out       Subjective:  Review of Systems  General:   No fever, no chills, some fatigue or weight loss  Pulmonary:    baseline dyspnea, no wheezing  Cardiac:    Denies recent chest pain,   GI:     No nausea or vomiting, no abdominal pain  Neuro:    No dizziness or light headedness,   Musculoskeletal:  No recent active issues  Extremities:   No edema, no obvious claudication       Objective:  Physical Exam  BP (!) 142/98   Pulse 74   Ht 5' 6\" (1.676 m)   BMI 37.77 kg/m²   General:   Well developed, well nourished  Lungs:   Clear to auscultation  Heart:    Normal S1 S2, Slight murmur. no rubs, no gallops  Abdomen:   Soft, non tender, no organomegalies, positive bowel sounds  Extremities:   No edema, no cyanosis, good peripheral pulses  Neurological:   Awake, alert, oriented. No obvious focal deficits  Musculoskelatal:  No obvious deformities    Assessment:      Diagnosis Orders   1. Coronary artery disease involving native coronary artery of native heart without angina pectoris  EKG 12 lead   2. Preoperative clearance  EKG 12 lead   3. Primary hypertension     4. Familial hypercholesterolemia     as above  Very limited capacity   No ischemia work up before  ECG in office was done today. I reviewed the ECG. No acute findings      Plan:  No follow-ups on file. Discussed  Consider a stress test   Very limited capacity and cant walk much   Continue risk factor modification and medical management    Thank you for allowing me to participate in the care of your patient. Please don't hesitate to contact me regarding any further issues related to the patient care    Orders Placed:  Orders Placed This Encounter   Procedures    EKG 12 lead     Order Specific Question:   Reason for Exam?     Answer:    Other Medications Prescribed:  No orders of the defined types were placed in this encounter. Discussed use, benefit, and side effects of prescribed medications. All patient questions answered. Pt voicedunderstanding. Instructed to continue current medications, diet and exercise. Continue risk factor modification and medical management. Patient agreed with treatment plan. Follow up as directed.     Electronically signedby Madeline Buckley MD on 10/13/2021 at 11:27 AM

## 2021-10-14 ENCOUNTER — HOSPITAL ENCOUNTER (OUTPATIENT)
Dept: NON INVASIVE DIAGNOSTICS | Age: 76
Discharge: HOME | End: 2021-10-14
Payer: MEDICARE

## 2021-10-14 ENCOUNTER — TELEPHONE (OUTPATIENT)
Dept: CARDIOLOGY CLINIC | Age: 76
End: 2021-10-14

## 2021-10-14 DIAGNOSIS — E78.01 FAMILIAL HYPERCHOLESTEROLEMIA: ICD-10-CM

## 2021-10-14 DIAGNOSIS — I25.10 CORONARY ARTERY DISEASE INVOLVING NATIVE CORONARY ARTERY OF NATIVE HEART WITHOUT ANGINA PECTORIS: ICD-10-CM

## 2021-10-14 DIAGNOSIS — Z01.818 PREOPERATIVE CLEARANCE: ICD-10-CM

## 2021-10-14 DIAGNOSIS — I10 PRIMARY HYPERTENSION: ICD-10-CM

## 2021-10-14 PROCEDURE — 78452 HT MUSCLE IMAGE SPECT MULT: CPT

## 2021-10-14 PROCEDURE — A9500 TC99M SESTAMIBI: HCPCS | Performed by: NUCLEAR MEDICINE

## 2021-10-14 PROCEDURE — 93017 CV STRESS TEST TRACING ONLY: CPT | Performed by: NUCLEAR MEDICINE

## 2021-10-14 PROCEDURE — 3430000000 HC RX DIAGNOSTIC RADIOPHARMACEUTICAL: Performed by: NUCLEAR MEDICINE

## 2021-10-14 PROCEDURE — 6360000002 HC RX W HCPCS

## 2021-10-14 RX ADMIN — Medication 10.1 MILLICURIE: at 07:25

## 2021-10-14 RX ADMIN — Medication 35 MILLICURIE: at 08:10

## 2021-11-15 RX ORDER — POTASSIUM CHLORIDE 20 MEQ/1
TABLET, EXTENDED RELEASE ORAL
Qty: 180 TABLET | Refills: 0 | Status: SHIPPED | OUTPATIENT
Start: 2021-11-15 | End: 2021-11-19 | Stop reason: SDUPTHER

## 2021-11-15 RX ORDER — CLOPIDOGREL BISULFATE 75 MG/1
TABLET ORAL
Qty: 90 TABLET | Refills: 3 | Status: SHIPPED | OUTPATIENT
Start: 2021-11-15 | End: 2021-11-19 | Stop reason: SDUPTHER

## 2021-11-15 RX ORDER — PRAVASTATIN SODIUM 80 MG/1
TABLET ORAL
Qty: 90 TABLET | Refills: 0 | Status: SHIPPED | OUTPATIENT
Start: 2021-11-15 | End: 2021-11-19 | Stop reason: SDUPTHER

## 2021-11-19 RX ORDER — POTASSIUM CHLORIDE 20 MEQ/1
TABLET, EXTENDED RELEASE ORAL
Qty: 180 TABLET | Refills: 3 | Status: SHIPPED | OUTPATIENT
Start: 2021-11-19 | End: 2022-10-19 | Stop reason: SDUPTHER

## 2021-11-19 RX ORDER — SOTALOL HYDROCHLORIDE 80 MG/1
TABLET ORAL
Qty: 90 TABLET | Refills: 3 | Status: SHIPPED | OUTPATIENT
Start: 2021-11-19 | End: 2022-10-19 | Stop reason: SDUPTHER

## 2021-11-19 RX ORDER — PRAVASTATIN SODIUM 80 MG/1
TABLET ORAL
Qty: 90 TABLET | Refills: 3 | Status: SHIPPED | OUTPATIENT
Start: 2021-11-19 | End: 2022-10-19 | Stop reason: SDUPTHER

## 2021-11-19 RX ORDER — CLOPIDOGREL BISULFATE 75 MG/1
TABLET ORAL
Qty: 90 TABLET | Refills: 3 | Status: SHIPPED | OUTPATIENT
Start: 2021-11-19 | End: 2022-10-19 | Stop reason: SDUPTHER

## 2021-11-19 NOTE — TELEPHONE ENCOUNTER
Franchesca Murray called requesting a refill on the following medications:  Requested Prescriptions     Pending Prescriptions Disp Refills    clopidogrel (PLAVIX) 75 MG tablet 90 tablet 3     Sig: Take 1 tablet by mouth once daily    pravastatin (PRAVACHOL) 80 MG tablet 90 tablet 0     Sig: Take 1 tablet by mouth once daily    potassium chloride (KLOR-CON M) 20 MEQ extended release tablet 180 tablet 0    sotalol (BETAPACE) 80 MG tablet 90 tablet 1     Sig: Take 1/2 (one-half) tablet by mouth twice daily     Pharmacy verified:[facility]   . pv      Date of last visit:   Date of next visit (if applicable): Visit date not found

## 2021-11-22 RX ORDER — IRBESARTAN AND HYDROCHLOROTHIAZIDE 150; 12.5 MG/1; MG/1
TABLET, FILM COATED ORAL
Qty: 90 TABLET | Refills: 3 | Status: SHIPPED | OUTPATIENT
Start: 2021-11-22 | End: 2022-10-19 | Stop reason: SDUPTHER

## 2021-11-22 NOTE — TELEPHONE ENCOUNTER
Tiana Keepers called requesting a refill on the following medications:  Requested Prescriptions     Pending Prescriptions Disp Refills    irbesartan-hydroCHLOROthiazide (AVALIDE) 150-12.5 MG per tablet 90 tablet 2     Sig: Take 1 tablet once a day     Pharmacy verified:  [facility]    Date of last visit: 10/13/2021  Date of next visit (if applicable): 20/45/3143

## 2022-08-16 ENCOUNTER — TELEPHONE (OUTPATIENT)
Dept: CARDIOLOGY CLINIC | Age: 77
End: 2022-08-16

## 2022-08-16 NOTE — TELEPHONE ENCOUNTER
Per Urology Dr Yuli Blake is asking if it is ok for patient to start med for erectile dysfunction.     Please advise

## 2022-10-19 ENCOUNTER — OFFICE VISIT (OUTPATIENT)
Dept: CARDIOLOGY CLINIC | Age: 77
End: 2022-10-19
Payer: MEDICARE

## 2022-10-19 VITALS
HEIGHT: 66 IN | SYSTOLIC BLOOD PRESSURE: 138 MMHG | WEIGHT: 234.6 LBS | DIASTOLIC BLOOD PRESSURE: 76 MMHG | HEART RATE: 78 BPM | BODY MASS INDEX: 37.7 KG/M2

## 2022-10-19 DIAGNOSIS — E78.01 FAMILIAL HYPERCHOLESTEROLEMIA: ICD-10-CM

## 2022-10-19 DIAGNOSIS — I25.10 CORONARY ARTERY DISEASE INVOLVING NATIVE CORONARY ARTERY OF NATIVE HEART WITHOUT ANGINA PECTORIS: Primary | ICD-10-CM

## 2022-10-19 DIAGNOSIS — I48.0 PAROXYSMAL ATRIAL FIBRILLATION (HCC): ICD-10-CM

## 2022-10-19 DIAGNOSIS — I10 PRIMARY HYPERTENSION: ICD-10-CM

## 2022-10-19 PROCEDURE — 93000 ELECTROCARDIOGRAM COMPLETE: CPT | Performed by: NUCLEAR MEDICINE

## 2022-10-19 PROCEDURE — 1123F ACP DISCUSS/DSCN MKR DOCD: CPT | Performed by: NUCLEAR MEDICINE

## 2022-10-19 PROCEDURE — 99214 OFFICE O/P EST MOD 30 MIN: CPT | Performed by: NUCLEAR MEDICINE

## 2022-10-19 RX ORDER — CLOPIDOGREL BISULFATE 75 MG/1
TABLET ORAL
Qty: 90 TABLET | Refills: 3 | Status: SHIPPED | OUTPATIENT
Start: 2022-10-19

## 2022-10-19 RX ORDER — SOTALOL HYDROCHLORIDE 80 MG/1
80 TABLET ORAL DAILY
Qty: 90 TABLET | Refills: 3 | Status: SHIPPED | OUTPATIENT
Start: 2022-10-19

## 2022-10-19 RX ORDER — IRBESARTAN AND HYDROCHLOROTHIAZIDE 150; 12.5 MG/1; MG/1
TABLET, FILM COATED ORAL
Qty: 90 TABLET | Refills: 3 | Status: SHIPPED | OUTPATIENT
Start: 2022-10-19

## 2022-10-19 RX ORDER — PRAVASTATIN SODIUM 80 MG/1
TABLET ORAL
Qty: 90 TABLET | Refills: 3 | Status: SHIPPED | OUTPATIENT
Start: 2022-10-19

## 2022-10-19 RX ORDER — POTASSIUM CHLORIDE 20 MEQ/1
TABLET, EXTENDED RELEASE ORAL
Qty: 180 TABLET | Refills: 3 | Status: SHIPPED | OUTPATIENT
Start: 2022-10-19

## 2022-10-19 NOTE — PROGRESS NOTES
[id number] [facility] .  [address] [place] [address]  Dept: [phone]  Dept Fax: [phone]  Loc: [phone]    Visit Date: 10/19/2022    Susan Rios is a 78 y.o. male who presents todayfor:  Chief Complaint   Patient presents with    1 Year Follow Up    Coronary Artery Disease    Hypertension    Hyperlipidemia   More dyspnea  More than usual   Progressive in nature  Some chest pain  No use of nitro   Previous stents  No recent cath   Does have COPD   No inhalers  Does have sleep apnea  No CPAP lately   BP is stable  No dizziness  No syncope        HPI:  HPI  Past Medical History:   Diagnosis Date    Anxiety     Bradycardia 2016    Heart disease     Hyperlipidemia     Hypertension     Medtronic linq loop recorder 2016    Neuropathy     Paroxysmal atrial fibrillation ([address] 2015    Spondylolisthesis     Syncopal episodes 2015      Past Surgical History:   Procedure Laterality Date    ANGIOPLASTY  07' 10'    x5    BACK SURGERY      CATARACT REMOVAL  2021    CHOLECYSTECTOMY      CORONARY ANGIOPLASTY WITH STENT PLACEMENT  8197,1616    EYE SURGERY      EYE SURGERY      HERNIA REPAIR      KNEE ARTHROSCOPY  1990    SHOULDER ARTHROSCOPY      TONSILLECTOMY      TURP       Family History   Problem Relation Age of Onset    Heart Disease Mother 62        CABG    High Cholesterol Mother     Cancer Mother     Heart Disease Father 61        stents    Cancer Father     Asthma Brother      Social History     Tobacco Use    Smoking status: Former     Packs/day: 5.00     Years: 15.00     Pack years: 75.00     Types: Cigarettes     Quit date: 2000     Years since quittin.7    Smokeless tobacco: Never   Substance Use Topics    Alcohol use: Yes     Comment: couple beers a night      Current Outpatient Medications   Medication Sig Dispense Refill    irbesartan-hydroCHLOROthiazide (AVALIDE) 150-12.5 MG per tablet Take 1 tablet once a day 90 tablet 3    clopidogrel (PLAVIX) 75 MG tablet Take 1 tablet by mouth once daily 90 tablet 3    pravastatin (PRAVACHOL) 80 MG tablet Take 1 tablet by mouth once daily 90 tablet 3    potassium chloride (KLOR-CON M) 20 MEQ extended release tablet Take 1 tablet by mouth twice daily 180 tablet 3    sotalol (BETAPACE) 80 MG tablet Take 1/2 (one-half) tablet by mouth twice daily (Patient taking differently: 80 mg daily) 90 tablet 3    tadalafil (CIALIS) 10 MG tablet Take 1 tablet by mouth daily as needed for Erectile Dysfunction 90 tablet 1    pregabalin (LYRICA) 50 MG capsule Take 50 mg by mouth 3 times daily. nitroGLYCERIN (NITROSTAT) 0.4 MG SL tablet Place 1 tablet under the tongue every 5 minutes as needed (PRN) 25 tablet 1    oxybutynin (DITROPAN) 5 MG tablet Take 5 mg by mouth nightly      tamsulosin (FLOMAX) 0.4 MG capsule Take 0.4 mg by mouth daily      aspirin 81 MG tablet Take 81 mg by mouth 2 times daily      rOPINIRole (REQUIP) 0.5 MG tablet Take 0.5 mg by mouth as needed (restless leg syndrome)       tizanidine (ZANAFLEX) 4 MG tablet Take 4 mg by mouth every 6 hours as needed. Cyanocobalamin (VITAMIN B 12 PO) Take 1,000 mg by mouth daily      DULoxetine (CYMBALTA) 30 MG extended release capsule Take 60 mg by mouth daily       esomeprazole (NEXIUM) 40 MG capsule Take 40 mg by mouth daily       fish oil-omega-3 fatty acids 1000 MG capsule Take 1 g by mouth daily        No current facility-administered medications for this visit.      No Known Allergies  Health Maintenance   Topic Date Due    COVID-19 Vaccine (1) Never done    Depression Screen  Never done    Hepatitis C screen  Never done    DTaP/Tdap/Td vaccine (1 - Tdap) Never done    Shingles vaccine (1 of 2) Never done    AAA screen  Never done    Lipids  06/27/2014    Annual Wellness Visit (AWV)  Never done    Flu vaccine (1) Never done    Colorectal Cancer Screen  12/06/2023    Pneumococcal 65+ years Vaccine Completed    Hepatitis A vaccine  Aged Out    Hib vaccine  Aged Out    Meningococcal (ACWY) vaccine  Aged Out       Subjective:  Review of Systems  General:   No fever, no chills, some fatigue or weight loss  Pulmonary:    Some more dyspnea, no wheezing  Cardiac:    Denies recent chest pain,   GI:     No nausea or vomiting, no abdominal pain  Neuro:     No dizziness or light headedness,   Musculoskeletal:  No recent active issues  Extremities:   No edema, no obvious claudication     Objective:  Physical Exam  /76   Pulse 78   Ht 5' 6\" (1.676 m)   Wt 234 lb 9.6 oz (106.4 kg)   BMI 37.87 kg/m²   General:   Well developed, well nourished  Lungs:    Clear to auscultation  Heart:    Normal S1 S2, Slight murmur. no rubs, no gallops  Abdomen:   Soft, non tender, no organomegalies, positive bowel sounds  Extremities:   No edema, no cyanosis, good peripheral pulses  Neurological:   Awake, alert, oriented. No obvious focal deficits  Musculoskelatal:  No obvious deformities    Assessment:      Diagnosis Orders   1. Coronary artery disease involving native coronary artery of native heart without angina pectoris  EKG 12 Lead      2. Primary hypertension        3. Familial hypercholesterolemia        High risk patient   Consider pulmonary causes as well  ECG in office was done today. I reviewed the ECG. No acute findings      Plan:  No follow-ups on file. Discussed  Cardiac cathterizaion, risks and benefits discussed with the patient and family at length. Patient was agreeable. Refer to pulmonary after that   Continue risk factor modification and medical management  Thank you for allowing me to participate in the care of your patient. Please don't hesitate to contact me regarding any further issues related to the patient care    Orders Placed:  Orders Placed This Encounter   Procedures    EKG 12 Lead     Order Specific Question:   Reason for Exam?     Answer:    Other       Medications Prescribed:  No orders of the defined types were placed in this encounter. Discussed use, benefit, and side effects of prescribed medications. All patient questions answered. Pt voicedunderstanding. Instructed to continue current medications, diet and exercise. Continue risk factor modification and medical management. Patient agreed with treatment plan. Follow up as directed.     Electronically signedby Casey Vera MD on 10/19/2022 at 12:35 PM

## 2022-10-24 ENCOUNTER — TELEPHONE (OUTPATIENT)
Dept: CARDIOLOGY CLINIC | Age: 77
End: 2022-10-24

## 2022-10-24 DIAGNOSIS — R93.1 ABNORMAL FINDINGS ON CARDIAC CATHETERIZATION: Primary | ICD-10-CM

## 2022-10-24 NOTE — TELEPHONE ENCOUNTER
Pre op Risk Assessment    Procedure EGD  Physician Dr. Ham Jamil  Date of surgery/procedure 11-22-22    Last OV 10-19-22  Last Stress 10-14-21  Last Echo 9-13-19  Last Cath 6-27-13.    [facility] is scheduled for 10-26-22    Last Stent 2007  Is patient on blood thinners ASA & Plavix  Hold Meds/how many days ASA 4 days & Plavix 7 days    Fax: [phone]

## 2022-10-24 NOTE — PRE-PROCEDURE INSTRUCTIONS
[facility] on 2nd [facility]  NPO after midnight  Bring drivers license and insurance information  Wear comfortable clean clothes  Shower morning of and night before with liquid antibacterial soap  Remove jewelry   May have to stay overnight if have PTCA/stent - bring small overnight bag  Bring medications in original bottles - may take am meds with small amount of water. Do not take any diabetic meds day of procedure. Made aware of visitors limit to 2 at a time  Follow all instructions given by your physician  Please notify doctor office if you need to cancel or reschedule your procedure   needed at discharge  Bring and wear your mask.   If you use CPap or BiPap for sleep apnea, please bring machine with you  Leave valuables at home

## 2022-10-25 ENCOUNTER — PREP FOR PROCEDURE (OUTPATIENT)
Dept: CARDIOLOGY | Age: 77
End: 2022-10-25

## 2022-10-25 RX ORDER — NITROGLYCERIN 0.4 MG/1
0.4 TABLET SUBLINGUAL EVERY 5 MIN PRN
Status: CANCELLED | OUTPATIENT
Start: 2022-10-25

## 2022-10-25 RX ORDER — SODIUM CHLORIDE 0.9 % (FLUSH) 0.9 %
5-40 SYRINGE (ML) INJECTION PRN
Status: CANCELLED | OUTPATIENT
Start: 2022-10-25

## 2022-10-25 RX ORDER — ASPIRIN 325 MG
325 TABLET ORAL ONCE
Status: CANCELLED | OUTPATIENT
Start: 2022-10-25 | End: 2022-10-25

## 2022-10-25 RX ORDER — SODIUM CHLORIDE 0.9 % (FLUSH) 0.9 %
5-40 SYRINGE (ML) INJECTION EVERY 12 HOURS SCHEDULED
Status: CANCELLED | OUTPATIENT
Start: 2022-10-25

## 2022-10-25 RX ORDER — SODIUM CHLORIDE 9 MG/ML
INJECTION, SOLUTION INTRAVENOUS PRN
Status: CANCELLED | OUTPATIENT
Start: 2022-10-25

## 2022-10-25 RX ORDER — DIPHENHYDRAMINE HYDROCHLORIDE 50 MG/ML
50 INJECTION INTRAMUSCULAR; INTRAVENOUS ONCE
Status: CANCELLED | OUTPATIENT
Start: 2022-10-25 | End: 2022-10-25

## 2022-10-26 ENCOUNTER — HOSPITAL ENCOUNTER (OUTPATIENT)
Dept: INPATIENT UNIT | Age: 77
Discharge: HOME | End: 2022-10-26
Attending: NUCLEAR MEDICINE | Admitting: NUCLEAR MEDICINE
Payer: MEDICARE

## 2022-10-26 VITALS
RESPIRATION RATE: 16 BRPM | HEART RATE: 69 BPM | OXYGEN SATURATION: 94 % | TEMPERATURE: 98.9 F | WEIGHT: 234 LBS | SYSTOLIC BLOOD PRESSURE: 131 MMHG | DIASTOLIC BLOOD PRESSURE: 70 MMHG | HEIGHT: 66 IN | BODY MASS INDEX: 37.61 KG/M2

## 2022-10-26 PROBLEM — I20.89 ANGINA OF EFFORT: Status: ACTIVE | Noted: 2022-10-26

## 2022-10-26 PROBLEM — I20.8 ANGINA OF EFFORT (HCC): Status: ACTIVE | Noted: 2022-10-26

## 2022-10-26 LAB
ABO: NORMAL
ANION GAP SERPL CALCULATED.3IONS-SCNC: 12 MEQ/L (ref 8–16)
ANTIBODY SCREEN: NORMAL
APTT: 44 SECONDS (ref 22–38)
BUN BLDV-MCNC: 16 MG/DL (ref 7–22)
CALCIUM SERPL-MCNC: 9.4 MG/DL (ref 8.5–10.5)
CHLORIDE BLD-SCNC: 106 MEQ/L (ref 98–111)
CO2: 23 MEQ/L (ref 23–33)
CREAT SERPL-MCNC: 1 MG/DL (ref 0.4–1.2)
ERYTHROCYTE [DISTWIDTH] IN BLOOD BY AUTOMATED COUNT: 13.2 % (ref 11.5–14.5)
ERYTHROCYTE [DISTWIDTH] IN BLOOD BY AUTOMATED COUNT: 45.5 FL (ref 35–45)
GFR SERPL CREATININE-BSD FRML MDRD: > 60 ML/MIN/1.73M2
GLUCOSE BLD-MCNC: 123 MG/DL (ref 70–108)
HCT VFR BLD CALC: 42 % (ref 42–52)
HEMOGLOBIN: 14.7 GM/DL (ref 14–18)
INR BLD: 0.9 (ref 0.85–1.13)
MCH RBC QN AUTO: 33.3 PG (ref 26–33)
MCHC RBC AUTO-ENTMCNC: 35 GM/DL (ref 32.2–35.5)
MCV RBC AUTO: 95 FL (ref 80–94)
PLATELET # BLD: 214 THOU/MM3 (ref 130–400)
PMV BLD AUTO: 10.4 FL (ref 9.4–12.4)
POTASSIUM SERPL-SCNC: 4.1 MEQ/L (ref 3.5–5.2)
RBC # BLD: 4.42 MILL/MM3 (ref 4.7–6.1)
RH FACTOR: NORMAL
SODIUM BLD-SCNC: 141 MEQ/L (ref 135–145)
WBC # BLD: 7.3 THOU/MM3 (ref 4.8–10.8)

## 2022-10-26 PROCEDURE — 85610 PROTHROMBIN TIME: CPT

## 2022-10-26 PROCEDURE — 6360000004 HC RX CONTRAST MEDICATION: Performed by: NUCLEAR MEDICINE

## 2022-10-26 PROCEDURE — 93005 ELECTROCARDIOGRAM TRACING: CPT | Performed by: STUDENT IN AN ORGANIZED HEALTH CARE EDUCATION/TRAINING PROGRAM

## 2022-10-26 PROCEDURE — 85027 COMPLETE CBC AUTOMATED: CPT

## 2022-10-26 PROCEDURE — 2580000003 HC RX 258: Performed by: STUDENT IN AN ORGANIZED HEALTH CARE EDUCATION/TRAINING PROGRAM

## 2022-10-26 PROCEDURE — 36415 COLL VENOUS BLD VENIPUNCTURE: CPT

## 2022-10-26 PROCEDURE — 80048 BASIC METABOLIC PNL TOTAL CA: CPT

## 2022-10-26 PROCEDURE — 93458 L HRT ARTERY/VENTRICLE ANGIO: CPT | Performed by: NUCLEAR MEDICINE

## 2022-10-26 PROCEDURE — 86900 BLOOD TYPING SEROLOGIC ABO: CPT

## 2022-10-26 PROCEDURE — C1894 INTRO/SHEATH, NON-LASER: HCPCS

## 2022-10-26 PROCEDURE — 85730 THROMBOPLASTIN TIME PARTIAL: CPT

## 2022-10-26 PROCEDURE — 86850 RBC ANTIBODY SCREEN: CPT

## 2022-10-26 PROCEDURE — 6360000002 HC RX W HCPCS

## 2022-10-26 PROCEDURE — C1769 GUIDE WIRE: HCPCS

## 2022-10-26 PROCEDURE — 86901 BLOOD TYPING SEROLOGIC RH(D): CPT

## 2022-10-26 PROCEDURE — 93458 L HRT ARTERY/VENTRICLE ANGIO: CPT

## 2022-10-26 PROCEDURE — 2500000003 HC RX 250 WO HCPCS

## 2022-10-26 RX ORDER — SODIUM CHLORIDE 0.9 % (FLUSH) 0.9 %
5-40 SYRINGE (ML) INJECTION PRN
Status: DISCONTINUED | OUTPATIENT
Start: 2022-10-26 | End: 2022-10-26 | Stop reason: SDUPTHER

## 2022-10-26 RX ORDER — SODIUM CHLORIDE 0.9 % (FLUSH) 0.9 %
5-40 SYRINGE (ML) INJECTION PRN
Status: DISCONTINUED | OUTPATIENT
Start: 2022-10-26 | End: 2022-10-26 | Stop reason: HOSPADM

## 2022-10-26 RX ORDER — ATROPINE SULFATE 0.4 MG/ML
0.5 AMPUL (ML) INJECTION
Status: DISCONTINUED | OUTPATIENT
Start: 2022-10-26 | End: 2022-10-26 | Stop reason: HOSPADM

## 2022-10-26 RX ORDER — DIPHENHYDRAMINE HYDROCHLORIDE 50 MG/ML
50 INJECTION INTRAMUSCULAR; INTRAVENOUS ONCE
Status: DISCONTINUED | OUTPATIENT
Start: 2022-10-26 | End: 2022-10-26 | Stop reason: HOSPADM

## 2022-10-26 RX ORDER — SODIUM CHLORIDE 9 MG/ML
INJECTION, SOLUTION INTRAVENOUS PRN
Status: DISCONTINUED | OUTPATIENT
Start: 2022-10-26 | End: 2022-10-26 | Stop reason: SDUPTHER

## 2022-10-26 RX ORDER — SODIUM CHLORIDE 9 MG/ML
INJECTION, SOLUTION INTRAVENOUS CONTINUOUS
Status: DISCONTINUED | OUTPATIENT
Start: 2022-10-26 | End: 2022-10-26 | Stop reason: HOSPADM

## 2022-10-26 RX ORDER — PANTOPRAZOLE SODIUM 40 MG/1
40 GRANULE, DELAYED RELEASE ORAL
COMMUNITY

## 2022-10-26 RX ORDER — ASPIRIN 325 MG
325 TABLET ORAL ONCE
Status: DISCONTINUED | OUTPATIENT
Start: 2022-10-26 | End: 2022-10-26 | Stop reason: HOSPADM

## 2022-10-26 RX ORDER — NITROGLYCERIN 0.4 MG/1
0.4 TABLET SUBLINGUAL EVERY 5 MIN PRN
Status: DISCONTINUED | OUTPATIENT
Start: 2022-10-26 | End: 2022-10-26 | Stop reason: HOSPADM

## 2022-10-26 RX ORDER — SODIUM CHLORIDE 0.9 % (FLUSH) 0.9 %
5-40 SYRINGE (ML) INJECTION EVERY 12 HOURS SCHEDULED
Status: DISCONTINUED | OUTPATIENT
Start: 2022-10-26 | End: 2022-10-26 | Stop reason: SDUPTHER

## 2022-10-26 RX ORDER — SODIUM CHLORIDE 0.9 % (FLUSH) 0.9 %
5-40 SYRINGE (ML) INJECTION EVERY 12 HOURS SCHEDULED
Status: DISCONTINUED | OUTPATIENT
Start: 2022-10-26 | End: 2022-10-26 | Stop reason: HOSPADM

## 2022-10-26 RX ORDER — SODIUM CHLORIDE 9 MG/ML
INJECTION, SOLUTION INTRAVENOUS PRN
Status: DISCONTINUED | OUTPATIENT
Start: 2022-10-26 | End: 2022-10-26 | Stop reason: HOSPADM

## 2022-10-26 RX ORDER — ACETAMINOPHEN 325 MG/1
650 TABLET ORAL EVERY 4 HOURS PRN
Status: DISCONTINUED | OUTPATIENT
Start: 2022-10-26 | End: 2022-10-26 | Stop reason: HOSPADM

## 2022-10-26 RX ADMIN — SODIUM CHLORIDE: 9 INJECTION, SOLUTION INTRAVENOUS at 13:40

## 2022-10-26 RX ADMIN — IOPAMIDOL 60 ML: 755 INJECTION, SOLUTION INTRAVENOUS at 15:35

## 2022-10-26 ASSESSMENT — LIFESTYLE VARIABLES
HOW OFTEN DO YOU HAVE A DRINK CONTAINING ALCOHOL: NEVER
HOW MANY STANDARD DRINKS CONTAINING ALCOHOL DO YOU HAVE ON A TYPICAL DAY: 1 OR 2

## 2022-10-26 NOTE — DISCHARGE INSTRUCTIONS
Cover site with dressing or bandaid for 5 days and change daily. Dressing from hospital  should be left intact until next morning. No lotions, creams or powder to site. Minimize movement of wrist for 24 hours, may use armboard. No lifting over 5 pounds with involved extremity for 3 days. Do not immerse arm in water for 5 days, example bathtub, hot tub, swimming pool. Call physician for any signs bleeding, swelling, pain, redness, coolness of extremity. If bleeding or swelling occurs apply firm direct pressure to site for 15 minutes and call 911/ physician.

## 2022-10-26 NOTE — PROGRESS NOTES
Patient admitted to 2E08  Ambulatory for cardiac cath. Patient NPO. Patient accompanied by family. Vital signs obtained. Assessment and data collection intiated. Oriented to room. Policies and procedures for 2E explained. All questions answered with no further questions at this time. Fall prevention and safety precautions discussed with patient.

## 2022-10-26 NOTE — PROCEDURES
[address] [id number]                            CARDIAC CATHETERIZATION    PATIENT NAME: Alvaro Fuentes                 :        1945  MED REC NO:   [id number]                           ROOM:       0008  ACCOUNT NO:   [de-identified]                           ADMIT DATE: 10/26/2022  PROVIDER:     DIDIER Mclean Erps OF PROCEDURE:  10/26/2022    CLINICAL HISTORY AND INDICATION:  This is a gentleman with COPD,  worsening symptoms of shortness of breath, history of angioplasty and  stenting, referred for cardiac cath due to symptoms of unstable angina  and abnormal stress test.    PROCEDURES:  1. Left heart cath with left ventriculogram.  2.  Coronary angiogram, right and left. 3.  Sedation with 2 of Versed, 50 of fentanyl between _____ in my  presence under my supervision. 4.  Blood loss less than 10 mL. PROCEDURE DETAILS:  Please refer to my catheterization detailed note. HEMODYNAMIC RESULTS AND LEFT VENTRICULOGRAM:  Left ventricular  end-diastolic pressure was 12 mmHg. No significant change before and  after contrast injection. No significant gradient across the aortic  valve to signify aortic stenosis. Left ventricular function was  globally within acceptable limits. EF 55%. CORONARY ARTERIOGRAM RESULTS:  1. Left main is patent, gives rise to left anterior descending and left  circumflex artery. 2.  Left anterior descending artery has 80% stenosis proximally with 90%  stenosis mid after the diagonal artery. Diagonal artery has a stent  which has in-stent restenosis of about 80%. 3.  Left circumflex artery is nondominant, has mild diffuse luminal  irregularity. 4.  Right coronary artery is large dominant, has diffuse 30-40%  nonobstructive disease. CONCLUSIONS:  1. Nonobstructive disease in the left circumflex artery and RCA.   2.  Significant disease in the LAD with restenosis of the diagonal  artery stent. 3.  Acceptable LV function. RECOMMENDATIONS:  At this point, results discussed with interventional  team as well as with the patient. Options of revascularization both  with open heart surgery versus repeat stenting with LAD diag stenting  was discussed with the patient at length including long-term prognosis. The patient is going to think about options with the family and decide  accordingly. We will continue aggressive medical treatment until  further decision is made.         Nimesh Wellington M.D.    D: 10/26/2022 15:51:03       T: 10/26/2022 15:53:26     [email or web address]  Job#: [id number]     Doc#: [id number]    CC:

## 2022-10-26 NOTE — PROGRESS NOTES
Returned to 2E08. Monitor attached showing SR. Vasc-band in place to right wrist.  Hemostasis maintained. No bleeding, swelling, or edema noted.   0.9NSS infusing with approx 500 ml remianing

## 2022-10-26 NOTE — H&P
[facility]  [facility]edation/Analgesia History & Physical    Pt Name: Angela Fregoso  Account number: [de-identified]  MRN: [id number]  YOB: 1945  Provider Performing Procedure: Gianni Marshall MD MD [facility]  Primary Care Physician: Julius Leon MD  Date: 10/26/2022    PRE-PROCEDURE    Code Status: FULL CODE  Brief History/Pre-Procedure Diagnosis:   Angina   CAD     Consent: : I have discussed with the patient risks, benefits, and alternatives (and relevant risks, benefits, and side effects related to alternatives or not receiving care), and likelihood of the success. The patient and/or representative appear to understand and agree to proceed. The discussion encompasses risks, benefits, and side effects related to the alternatives and the risks related to not receiving the proposed care, treatment, and services. MEDICAL HISTORY  []ASHD/ANGINA/MI/CHF   [x]Hypertension  []Diabetes  []Hyperlipidemia  []Smoking  []Family Hx of ASHD  []Additional information:       has a past medical history of Anxiety, Bradycardia, Heart disease, Hyperlipidemia, Hypertension, Medtronic linq loop recorder, Neuropathy, Paroxysmal atrial fibrillation ([facility] [address] Spondylolisthesis, and Syncopal episodes. SURGICAL HISTORY   has a past surgical history that includes Eye surgery; hernia repair; back surgery (2008); Cholecystectomy; angioplasty (07' 10'); Tonsillectomy; Knee arthroscopy (1990); eye surgery; Coronary angioplasty with stent (9963,6192); TURP (2015); Shoulder arthroscopy; and Cataract removal (05/04/2021).   Additional information:       ALLERGIES   Allergies as of 10/26/2022    (No Known Allergies)     Additional information:       MEDICATIONS   Aspirin  [x] 81 mg  [] 325 mg  [] None  Antiplatelet drug therapy use last 5 days  []No []Yes  Coumadin Use Last 5 Days []No []Yes  Other anticoagulant use last 5 days  []No []Yes    Current Facility-Administered Medications:     sodium chloride flush 0.9 % injection 5-40 mL, 5-40 mL, IntraVENous, 2 times per day, Graeme Craig PA-C    sodium chloride flush 0.9 % injection 5-40 mL, 5-40 mL, IntraVENous, PRN, Graeme Craig PA-C    0.9 % sodium chloride infusion, , IntraVENous, PRN, Graeme Craig PA-C, Last Rate: 75 mL/hr at 10/26/22 1340, New Bag at 10/26/22 1340    aspirin tablet 325 mg, 325 mg, Oral, Once, Graeme Craig PA-C    diphenhydrAMINE (BENADRYL) injection 50 mg, 50 mg, IntraVENous, Once, Graeme Craig PA-C    hydrocortisone sodium succinate PF (SOLU-CORTEF) injection 200 mg, 200 mg, IntraVENous, Once, Graeme Craig PA-C    nitroGLYCERIN (NITROSTAT) SL tablet 0.4 mg, 0.4 mg, SubLINGual, Q5 Min PRN, Graeme Craig PA-C  Prior to Admission medications    Medication Sig Start Date End Date Taking? Authorizing Provider   pantoprazole sodium (PROTONIX) 40 MG PACK packet Take 40 mg by mouth every morning (before breakfast)   Yes Historical Provider, MD   irbesartan-hydroCHLOROthiazide (AVALIDE) 150-12.5 MG per tablet Take 1 tablet once a day 10/19/22   Devi Conteh MD   clopidogrel (PLAVIX) 75 MG tablet Take 1 tablet by mouth once daily 10/19/22   Devi Conteh MD   pravastatin (PRAVACHOL) 80 MG tablet Take 1 tablet by mouth once daily 10/19/22   Devi Conteh MD   potassium chloride (KLOR-CON M) 20 MEQ extended release tablet Take 1 tablet by mouth twice daily 10/19/22   Devi Conteh MD   sotalol (BETAPACE) 80 MG tablet Take 1 tablet by mouth daily 10/19/22   Devi Conteh MD   tadalafil (CIALIS) 10 MG tablet Take 1 tablet by mouth daily as needed for Erectile Dysfunction  Patient not taking: Reported on 10/26/2022 10/21/20   Devi Conteh MD   pregabalin (LYRICA) 50 MG capsule Take 50 mg by mouth 3 times daily.     Historical Provider, MD   Cyanocobalamin (VITAMIN B 12 PO) Take 1,000 mg by mouth daily    Historical Provider, MD   nitroGLYCERIN (NITROSTAT) 0.4 MG SL tablet Place 1 tablet under the tongue every 5 minutes as needed (PRN) 3/15/19   Bronwyn Johns MD   DULoxetine (CYMBALTA) 30 MG extended release capsule Take 60 mg by mouth daily     Historical Provider, MD   oxybutynin (DITROPAN) 5 MG tablet Take 5 mg by mouth nightly    Historical Provider, MD   tamsulosin (FLOMAX) 0.4 MG capsule Take 0.4 mg by mouth daily    Historical Provider, MD   aspirin 81 MG tablet Take 81 mg by mouth 2 times daily    Historical Provider, MD   rOPINIRole (REQUIP) 0.5 MG tablet Take 0.5 mg by mouth as needed (restless leg syndrome)   Patient not taking: Reported on 10/26/2022    Historical Provider, MD   esomeprazole (NEXIUM) 40 MG capsule Take 40 mg by mouth daily   Patient not taking: Reported on 10/26/2022    Historical Provider, MD   tizanidine (ZANAFLEX) 4 MG tablet Take 4 mg by mouth every 6 hours as needed.       Historical Provider, MD   fish oil-omega-3 fatty acids 1000 MG capsule Take 1 g by mouth daily     Historical Provider, MD     Additional information:       VITAL SIGNS   Vitals:    10/26/22 1326   BP:    Pulse: 64   Resp:    Temp:    SpO2:        PHYSICAL:   General: No acute distress  HEENT:  Unremarkable for age  Neck: without increased JVD, carotid pulses 2+ bilaterally without bruits  Heart: RRR, S1 & S2 WNL, S4 gallop, without murmurs or rubs    Lungs: Clear to auscultation    Abdomen: BS present, without HSM, masses, or tenderness    Extremities: without C,C,E.  Pulses 2+ bilaterally  Mental Status: Alert & Oriented        PLANNED PROCEDURE   [x]Cath  []PCI                []Pacemaker/AICD  []RAYA             []Cardioversion []Peripheral angiography/PTA  []Other:      SEDATION  Planned agent:[x]Midazolam []Meperidine [x]Sublimaze []Morphine  []Diazepam  []Other:     ASA Classification:  []1 [x]2 []3 []4 []5  Class 1: A normal healthy patient  Class 2: Pt with mild to moderate systemic disease  Class 3: Severe systemic disease or disturbance  Class 4: Severe systemic disorders that are already life threatening. Class 5: Moribund pt with little chances of survival, for more than 24 hours. Mallampati I Airway Classification:   []1 [x]2 []3 []4    [x]Pre-procedure diagnostic studies complete and results available. Comment:    [x]Previous sedation/anesthesia experiences assessed. Comment:    [x]The patient is an appropriate candidate to undergo the planned procedure sedation and anesthesia. (Refer to nursing sedation/analgesia documentation record)  [x]Formulation and discussion of sedation/procedure plan, risks, and expectations with patient and/or responsible adult completed. [x]Patient examined immediately prior to the procedure.  (Refer to nursing sedation/analgesia documentation record)    Jg Girard MD MD [facility]  Electronically signed 10/26/2022 at 2:35 PM

## 2022-10-27 LAB
EKG ATRIAL RATE: 71 BPM
EKG P AXIS: 63 DEGREES
EKG P-R INTERVAL: 198 MS
EKG Q-T INTERVAL: 382 MS
EKG QRS DURATION: 88 MS
EKG QTC CALCULATION (BAZETT): 415 MS
EKG R AXIS: 7 DEGREES
EKG T AXIS: 64 DEGREES
EKG VENTRICULAR RATE: 71 BPM

## 2022-10-27 PROCEDURE — 93010 ELECTROCARDIOGRAM REPORT: CPT | Performed by: INTERNAL MEDICINE

## 2022-10-27 NOTE — TELEPHONE ENCOUNTER
Pci scheduled 11-15-22 @ 10:00am  Call to wife, date, time and instructions reviewed  Wife concerned procedure not for another few weeks  Spoke to dr cantu, rescheduled pci to  11-08-22 @ 11:00am    Wife notified, instructions mailed to pt.

## 2022-10-27 NOTE — TELEPHONE ENCOUNTER
Pt's wife LM stating they decided on PCI rather than CABG. Okay to order?   Call pt's wife at [phone]

## 2022-11-03 NOTE — PRE-PROCEDURE INSTRUCTIONS
[place] on 2nd floor [facility]  NPO after midnight  Bring drivers license and insurance information  Wear comfortable clean clothes  Shower morning of and night before with liquid antibacterial soap  Remove jewelry   May have to stay overnight if have PTCA/stent - bring small overnight bag  Bring medications in original bottles - may take am meds with small amount of water. Do not take any diabetic meds day of procedure. Made aware of visitors limit to 2 at a time  Follow all instructions given by your physician  Please notify doctor office if you need to cancel or reschedule your procedure   needed at discharge  Bring and wear your mask.   If you use CPap or BiPap for sleep apnea, please bring machine with you  Leave valuables at home

## 2022-11-07 ENCOUNTER — PREP FOR PROCEDURE (OUTPATIENT)
Dept: CARDIOLOGY | Age: 77
End: 2022-11-07

## 2022-11-07 RX ORDER — SODIUM CHLORIDE 0.9 % (FLUSH) 0.9 %
5-40 SYRINGE (ML) INJECTION PRN
Status: CANCELLED | OUTPATIENT
Start: 2022-11-07

## 2022-11-07 RX ORDER — SODIUM CHLORIDE 0.9 % (FLUSH) 0.9 %
5-40 SYRINGE (ML) INJECTION EVERY 12 HOURS SCHEDULED
Status: CANCELLED | OUTPATIENT
Start: 2022-11-07

## 2022-11-07 RX ORDER — ASPIRIN 325 MG
325 TABLET ORAL ONCE
Status: CANCELLED | OUTPATIENT
Start: 2022-11-07 | End: 2022-11-07

## 2022-11-07 RX ORDER — SODIUM CHLORIDE 9 MG/ML
INJECTION, SOLUTION INTRAVENOUS PRN
Status: CANCELLED | OUTPATIENT
Start: 2022-11-07

## 2022-11-07 RX ORDER — SODIUM CHLORIDE 9 MG/ML
INJECTION, SOLUTION INTRAVENOUS CONTINUOUS
Status: CANCELLED | OUTPATIENT
Start: 2022-11-07

## 2022-11-08 ENCOUNTER — ANESTHESIA EVENT (OUTPATIENT)
Dept: OPERATING ROOM | Age: 77
DRG: 235 | End: 2022-11-08
Payer: MEDICARE

## 2022-11-08 ENCOUNTER — ANESTHESIA (OUTPATIENT)
Dept: OPERATING ROOM | Age: 77
DRG: 235 | End: 2022-11-08
Payer: MEDICARE

## 2022-11-08 ENCOUNTER — APPOINTMENT (OUTPATIENT)
Dept: GENERAL RADIOLOGY | Age: 77
DRG: 235 | End: 2022-11-08
Attending: INTERNAL MEDICINE
Payer: MEDICARE

## 2022-11-08 ENCOUNTER — HOSPITAL ENCOUNTER (INPATIENT)
Dept: INPATIENT UNIT | Age: 77
LOS: 6 days | Discharge: HOME | DRG: 235 | End: 2022-11-15
Attending: INTERNAL MEDICINE | Admitting: INTERNAL MEDICINE
Payer: MEDICARE

## 2022-11-08 DIAGNOSIS — R93.1 ABNORMAL FINDINGS ON CARDIAC CATHETERIZATION: ICD-10-CM

## 2022-11-08 DIAGNOSIS — Z95.1 S/P CABG (CORONARY ARTERY BYPASS GRAFT): Primary | ICD-10-CM

## 2022-11-08 PROBLEM — Z95.820 STATUS POST ANGIOPLASTY WITH STENT: Status: ACTIVE | Noted: 2022-11-08

## 2022-11-08 LAB
% INHIBITION AA: 93.4 %
% INHIBITION ADP: 45.7 %
AA % AGGREGATION: 6.6 %
AA AGONIST MAX AMPLITUDE: 15.8 MM (ref 51–71)
ABO: NORMAL
ACCELERATED MAX AMPLITUDE: 64 MM (ref 52–70)
ACTIVATED CLOTTING TIME: 143 SECONDS (ref 99–130)
ACTIVATED CLOTTING TIME: 260 SECONDS (ref 1–150)
ACTIVATED CLOTTING TIME: 283 SECONDS (ref 1–150)
ACTIVATED CLOTTING TIME: 329 SECONDS (ref 1–150)
ACTIVATED CLOTTING TIME: 551 SECONDS (ref 99–130)
ACTIVATED CLOTTING TIME: 716 SECONDS (ref 99–130)
ADP AGONIST MAX AMPLITUDE: 40.3 MM (ref 45–69)
ADP PERCENT AGGREGATION: 54.3 %
ANION GAP SERPL CALCULATED.3IONS-SCNC: 12 MEQ/L (ref 8–16)
ANION GAP SERPL CALCULATED.3IONS-SCNC: 15 MEQ/L (ref 8–16)
ANTIBODY SCREEN: NORMAL
APTT: 44 SECONDS (ref 22–38)
BASE EXCESS (CALCULATED): -1.2 MMOL/L (ref -2.5–2.5)
BASE EXCESS (CALCULATED): -2.3 MMOL/L (ref -2.5–2.5)
BASE EXCESS (CALCULATED): -2.5 MMOL/L (ref -2.5–2.5)
BASE EXCESS (CALCULATED): -5 MMOL/L (ref -2.5–2.5)
BASE EXCESS (CALCULATED): -5.4 MMOL/L (ref -2.5–2.5)
BASE EXCESS (CALCULATED): -5.9 MMOL/L (ref -2.5–2.5)
BUN BLDV-MCNC: 12 MG/DL (ref 7–22)
BUN BLDV-MCNC: 16 MG/DL (ref 7–22)
CALCIUM IONIZED SERUM: 0.98 MMOL/L (ref 1.12–1.32)
CALCIUM IONIZED SERUM: 1.07 MMOL/L (ref 1.12–1.32)
CALCIUM IONIZED SERUM: 1.08 MMOL/L (ref 1.12–1.32)
CALCIUM IONIZED SERUM: 1.15 MMOL/L (ref 1.12–1.32)
CALCIUM IONIZED: 1 MMOL/L (ref 1.12–1.32)
CALCIUM SERPL-MCNC: 7.2 MG/DL (ref 8.5–10.5)
CALCIUM SERPL-MCNC: 9.9 MG/DL (ref 8.5–10.5)
CARTRIDGE COLOR: NORMAL
CHLORIDE BLD-SCNC: 103 MEQ/L (ref 98–111)
CHLORIDE BLD-SCNC: 104 MEQ/L (ref 98–111)
CHOLESTEROL, TOTAL: 136 MG/DL (ref 100–199)
CITRATED KAOLIN ANGLE: 64.2 DEG (ref 63–78)
CITRATED KAOLIN K TIME: 2 MINUTES (ref 0.8–2.1)
CITRATED KAOLIN MAX AMPLITUDE: 61.1 MM (ref 52–69)
CITRATED KAOLIN R TIME: 13.4 MINUTES (ref 4.6–9.1)
CO2: 20 MEQ/L (ref 23–33)
CO2: 26 MEQ/L (ref 23–33)
COLLECTED BY:: ABNORMAL
CREAT SERPL-MCNC: 0.9 MG/DL (ref 0.4–1.2)
CREAT SERPL-MCNC: 1.1 MG/DL (ref 0.4–1.2)
DEVICE: ABNORMAL
EKG ATRIAL RATE: 59 BPM
EKG ATRIAL RATE: 70 BPM
EKG P AXIS: 54 DEGREES
EKG P AXIS: 74 DEGREES
EKG P-R INTERVAL: 194 MS
EKG P-R INTERVAL: 206 MS
EKG Q-T INTERVAL: 372 MS
EKG Q-T INTERVAL: 410 MS
EKG QRS DURATION: 84 MS
EKG QRS DURATION: 88 MS
EKG QTC CALCULATION (BAZETT): 401 MS
EKG QTC CALCULATION (BAZETT): 405 MS
EKG R AXIS: -12 DEGREES
EKG R AXIS: 15 DEGREES
EKG T AXIS: 63 DEGREES
EKG T AXIS: 75 DEGREES
EKG VENTRICULAR RATE: 59 BPM
EKG VENTRICULAR RATE: 70 BPM
ERYTHROCYTE [DISTWIDTH] IN BLOOD BY AUTOMATED COUNT: 13 % (ref 11.5–14.5)
ERYTHROCYTE [DISTWIDTH] IN BLOOD BY AUTOMATED COUNT: 13.2 % (ref 11.5–14.5)
ERYTHROCYTE [DISTWIDTH] IN BLOOD BY AUTOMATED COUNT: 44 FL (ref 35–45)
ERYTHROCYTE [DISTWIDTH] IN BLOOD BY AUTOMATED COUNT: 46.7 FL (ref 35–45)
FIBRINOGEN MAX AMPLITUDE: 22.8 MM (ref 15–32)
FUNCT FIBRINOGEN LEVEL: 416.1 MG/DL (ref 278–581)
GFR SERPL CREATININE-BSD FRML MDRD: > 60 ML/MIN/1.73M2
GFR SERPL CREATININE-BSD FRML MDRD: > 60 ML/MIN/1.73M2
GLUCOSE BLD-MCNC: 133 MG/DL (ref 70–108)
GLUCOSE BLD-MCNC: 215 MG/DL (ref 70–108)
GLUCOSE, WHOLE BLOOD: 119 MG/DL (ref 70–108)
GLUCOSE, WHOLE BLOOD: 133 MG/DL (ref 70–108)
GLUCOSE, WHOLE BLOOD: 174 MG/DL (ref 70–108)
GLUCOSE, WHOLE BLOOD: 210 MG/DL (ref 70–108)
GLUCOSE, WHOLE BLOOD: 218 MG/DL (ref 70–108)
HCO3: 21 MMOL/L (ref 23–28)
HCO3: 21 MMOL/L (ref 23–28)
HCO3: 22 MMOL/L (ref 23–28)
HCO3: 24 MMOL/L (ref 23–28)
HCT VFR BLD CALC: 28.5 % (ref 42–52)
HCT VFR BLD CALC: 38.1 % (ref 42–52)
HCT VFR BLD CALC: 45.4 % (ref 42–52)
HDLC SERPL-MCNC: 52 MG/DL
HEMOGLOBIN FINGERSTICK, POC: 11.5 G/DL (ref 14–18)
HEMOGLOBIN FINGERSTICK, POC: 12 G/DL (ref 14–18)
HEMOGLOBIN FINGERSTICK, POC: 13.6 G/DL (ref 14–18)
HEMOGLOBIN FINGERSTICK, POC: 9 G/DL (ref 14–18)
HEMOGLOBIN: 13.7 GM/DL (ref 14–18)
HEMOGLOBIN: 15.9 GM/DL (ref 14–18)
HEMOGLOBIN: 9.9 GM/DL (ref 14–18)
HEPARINASE R TIME: 9.7 MINUTES (ref 4.3–8.3)
IFIO2: 100
IFIO2: 100
INHIBITED PLATELET MAX AMPLITUDE: 12.4 MM (ref 2–19)
INR BLD: 0.95 (ref 0.85–1.13)
LDL CHOLESTEROL CALCULATED: 58 MG/DL
MAGNESIUM: 2.2 MG/DL (ref 1.6–2.4)
MAXIMUM AMPLITUDE: 63.8 MM (ref 53–68)
MCH RBC QN AUTO: 32.7 PG (ref 26–33)
MCH RBC QN AUTO: 33.4 PG (ref 26–33)
MCHC RBC AUTO-ENTMCNC: 34.7 GM/DL (ref 32.2–35.5)
MCHC RBC AUTO-ENTMCNC: 35 GM/DL (ref 32.2–35.5)
MCV RBC AUTO: 93.4 FL (ref 80–94)
MCV RBC AUTO: 96.3 FL (ref 80–94)
MODE: ABNORMAL
MODE: ABNORMAL
O2 SATURATION: 100 %
O2 SATURATION: 95 %
O2 SATURATION: 98 %
O2 SATURATION: 99 %
PATIENT HEPARIN CONCENTRATION: 0
PATIENT HEPARIN CONCENTRATION: 2
PATIENT HEPARIN CONCENTRATION: 3
PCO2: 43 MMHG (ref 35–45)
PCO2: 45 MMHG (ref 35–45)
PCO2: 46 MMHG (ref 35–45)
PCO2: 47 MMHG (ref 35–45)
PH BLOOD GAS: 7.27 (ref 7.35–7.45)
PH BLOOD GAS: 7.32 (ref 7.35–7.45)
PH BLOOD GAS: 7.33 (ref 7.35–7.45)
PH BLOOD GAS: 7.36 (ref 7.35–7.45)
PIP: 26 CMH2O
PIP: 26 CMH2O
PLATELET # BLD: 198 THOU/MM3 (ref 130–400)
PLATELET # BLD: 219 THOU/MM3 (ref 130–400)
PLATELET # BLD: 240 THOU/MM3 (ref 130–400)
PMV BLD AUTO: 10.5 FL (ref 9.4–12.4)
PMV BLD AUTO: 10.5 FL (ref 9.4–12.4)
PO2: 124 MMHG (ref 71–104)
PO2: 132 MMHG (ref 71–104)
PO2: 344 MMHG (ref 71–104)
PO2: 347 MMHG (ref 71–104)
PO2: 454 MMHG (ref 71–104)
PO2: 84 MMHG (ref 71–104)
POTASSIUM SERPL-SCNC: 3.7 MEQ/L (ref 3.5–5.2)
POTASSIUM SERPL-SCNC: 4.4 MEQ/L (ref 3.5–5.2)
POTASSIUM, WHOLE BLOOD: 3.1 MEQ/L (ref 3.5–4.9)
POTASSIUM, WHOLE BLOOD: 3.8 MEQ/L (ref 3.5–4.9)
POTASSIUM, WHOLE BLOOD: 3.9 MEQ/L (ref 3.5–4.9)
POTASSIUM, WHOLE BLOOD: 4 MEQ/L (ref 3.5–4.9)
RANGE: NORMAL
RBC # BLD: 2.96 MILL/MM3 (ref 4.7–6.1)
RBC # BLD: 4.86 MILL/MM3 (ref 4.7–6.1)
RH FACTOR: NORMAL
SET PEEP: 6 MMHG
SET PEEP: 6 MMHG
SET RESPIRATORY RATE: 16 BPM
SODIUM BLD-SCNC: 139 MEQ/L (ref 135–145)
SODIUM BLD-SCNC: 141 MEQ/L (ref 135–145)
SODIUM, WHOLE BLOOD: 139 MEQ/L (ref 138–146)
SODIUM, WHOLE BLOOD: 141 MEQ/L (ref 138–146)
SODIUM, WHOLE BLOOD: 141 MEQ/L (ref 138–146)
SODIUM, WHOLE BLOOD: 143 MEQ/L (ref 138–146)
SOURCE, BLOOD GAS: ABNORMAL
TRIGL SERPL-MCNC: 131 MG/DL (ref 0–199)
WBC # BLD: 18.3 THOU/MM3 (ref 4.8–10.8)
WBC # BLD: 9.2 THOU/MM3 (ref 4.8–10.8)

## 2022-11-08 PROCEDURE — 021009W BYPASS CORONARY ARTERY, ONE ARTERY FROM AORTA WITH AUTOLOGOUS VENOUS TISSUE, OPEN APPROACH: ICD-10-PCS | Performed by: THORACIC SURGERY (CARDIOTHORACIC VASCULAR SURGERY)

## 2022-11-08 PROCEDURE — 06BP4ZZ EXCISION OF RIGHT SAPHENOUS VEIN, PERCUTANEOUS ENDOSCOPIC APPROACH: ICD-10-PCS | Performed by: THORACIC SURGERY (CARDIOTHORACIC VASCULAR SURGERY)

## 2022-11-08 PROCEDURE — 2500000003 HC RX 250 WO HCPCS: Performed by: NURSE ANESTHETIST, CERTIFIED REGISTERED

## 2022-11-08 PROCEDURE — 33517 CABG ARTERY-VEIN SINGLE: CPT | Performed by: PHYSICIAN ASSISTANT

## 2022-11-08 PROCEDURE — 2500000003 HC RX 250 WO HCPCS

## 2022-11-08 PROCEDURE — 2580000003 HC RX 258: Performed by: THORACIC SURGERY (CARDIOTHORACIC VASCULAR SURGERY)

## 2022-11-08 PROCEDURE — 85018 HEMOGLOBIN: CPT

## 2022-11-08 PROCEDURE — 86850 RBC ANTIBODY SCREEN: CPT

## 2022-11-08 PROCEDURE — 93010 ELECTROCARDIOGRAM REPORT: CPT | Performed by: NUCLEAR MEDICINE

## 2022-11-08 PROCEDURE — 71045 X-RAY EXAM CHEST 1 VIEW: CPT

## 2022-11-08 PROCEDURE — B2101ZZ FLUOROSCOPY OF SINGLE CORONARY ARTERY USING LOW OSMOLAR CONTRAST: ICD-10-PCS | Performed by: INTERNAL MEDICINE

## 2022-11-08 PROCEDURE — 37799 UNLISTED PX VASCULAR SURGERY: CPT

## 2022-11-08 PROCEDURE — 93005 ELECTROCARDIOGRAM TRACING: CPT | Performed by: PHYSICIAN ASSISTANT

## 2022-11-08 PROCEDURE — C9601 PERC DRUG-EL COR STENT BRAN: HCPCS

## 2022-11-08 PROCEDURE — 82947 ASSAY GLUCOSE BLOOD QUANT: CPT

## 2022-11-08 PROCEDURE — 33508 ENDOSCOPIC VEIN HARVEST: CPT | Performed by: PHYSICIAN ASSISTANT

## 2022-11-08 PROCEDURE — 82330 ASSAY OF CALCIUM: CPT

## 2022-11-08 PROCEDURE — C1725 CATH, TRANSLUMIN NON-LASER: HCPCS

## 2022-11-08 PROCEDURE — 85027 COMPLETE CBC AUTOMATED: CPT

## 2022-11-08 PROCEDURE — 92929 PR PRQ TRLUML CORONARY STENT W/ANGIO ADDL ART/BRNCH: CPT | Performed by: INTERNAL MEDICINE

## 2022-11-08 PROCEDURE — 6360000002 HC RX W HCPCS: Performed by: NURSE ANESTHETIST, CERTIFIED REGISTERED

## 2022-11-08 PROCEDURE — 33508 ENDOSCOPIC VEIN HARVEST: CPT | Performed by: THORACIC SURGERY (CARDIOTHORACIC VASCULAR SURGERY)

## 2022-11-08 PROCEDURE — 3700000001 HC ADD 15 MINUTES (ANESTHESIA): Performed by: THORACIC SURGERY (CARDIOTHORACIC VASCULAR SURGERY)

## 2022-11-08 PROCEDURE — 85049 AUTOMATED PLATELET COUNT: CPT

## 2022-11-08 PROCEDURE — 94761 N-INVAS EAR/PLS OXIMETRY MLT: CPT

## 2022-11-08 PROCEDURE — 93005 ELECTROCARDIOGRAM TRACING: CPT | Performed by: INTERNAL MEDICINE

## 2022-11-08 PROCEDURE — 82803 BLOOD GASES ANY COMBINATION: CPT

## 2022-11-08 PROCEDURE — 3600000008 HC SURGERY OHS BASE: Performed by: THORACIC SURGERY (CARDIOTHORACIC VASCULAR SURGERY)

## 2022-11-08 PROCEDURE — 33533 CABG ARTERIAL SINGLE: CPT | Performed by: THORACIC SURGERY (CARDIOTHORACIC VASCULAR SURGERY)

## 2022-11-08 PROCEDURE — 92928 PRQ TCAT PLMT NTRAC ST 1 LES: CPT | Performed by: INTERNAL MEDICINE

## 2022-11-08 PROCEDURE — 2580000003 HC RX 258: Performed by: PHYSICIAN ASSISTANT

## 2022-11-08 PROCEDURE — 86923 COMPATIBILITY TEST ELECTRIC: CPT

## 2022-11-08 PROCEDURE — 6360000002 HC RX W HCPCS

## 2022-11-08 PROCEDURE — 6360000002 HC RX W HCPCS: Performed by: THORACIC SURGERY (CARDIOTHORACIC VASCULAR SURGERY)

## 2022-11-08 PROCEDURE — C1874 STENT, COATED/COV W/DEL SYS: HCPCS

## 2022-11-08 PROCEDURE — P9037 PLATE PHERES LEUKOREDU IRRAD: HCPCS

## 2022-11-08 PROCEDURE — 2709999900 HC NON-CHARGEABLE SUPPLY: Performed by: THORACIC SURGERY (CARDIOTHORACIC VASCULAR SURGERY)

## 2022-11-08 PROCEDURE — 85730 THROMBOPLASTIN TIME PARTIAL: CPT

## 2022-11-08 PROCEDURE — C9600 PERC DRUG-EL COR STENT SING: HCPCS

## 2022-11-08 PROCEDURE — 85576 BLOOD PLATELET AGGREGATION: CPT

## 2022-11-08 PROCEDURE — P9045 ALBUMIN (HUMAN), 5%, 250 ML: HCPCS | Performed by: NURSE ANESTHETIST, CERTIFIED REGISTERED

## 2022-11-08 PROCEDURE — 02100Z9 BYPASS CORONARY ARTERY, ONE ARTERY FROM LEFT INTERNAL MAMMARY, OPEN APPROACH: ICD-10-PCS | Performed by: THORACIC SURGERY (CARDIOTHORACIC VASCULAR SURGERY)

## 2022-11-08 PROCEDURE — 94002 VENT MGMT INPAT INIT DAY: CPT

## 2022-11-08 PROCEDURE — 2500000003 HC RX 250 WO HCPCS: Performed by: INTERNAL MEDICINE

## 2022-11-08 PROCEDURE — 3700000000 HC ANESTHESIA ATTENDED CARE: Performed by: THORACIC SURGERY (CARDIOTHORACIC VASCULAR SURGERY)

## 2022-11-08 PROCEDURE — 33517 CABG ARTERY-VEIN SINGLE: CPT | Performed by: THORACIC SURGERY (CARDIOTHORACIC VASCULAR SURGERY)

## 2022-11-08 PROCEDURE — 85347 COAGULATION TIME ACTIVATED: CPT

## 2022-11-08 PROCEDURE — 84132 ASSAY OF SERUM POTASSIUM: CPT

## 2022-11-08 PROCEDURE — C1887 CATHETER, GUIDING: HCPCS

## 2022-11-08 PROCEDURE — 84295 ASSAY OF SERUM SODIUM: CPT

## 2022-11-08 PROCEDURE — 36415 COLL VENOUS BLD VENIPUNCTURE: CPT

## 2022-11-08 PROCEDURE — 2580000003 HC RX 258: Performed by: NURSE ANESTHETIST, CERTIFIED REGISTERED

## 2022-11-08 PROCEDURE — C1729 CATH, DRAINAGE: HCPCS | Performed by: THORACIC SURGERY (CARDIOTHORACIC VASCULAR SURGERY)

## 2022-11-08 PROCEDURE — 80061 LIPID PANEL: CPT

## 2022-11-08 PROCEDURE — B24BZZ4 ULTRASONOGRAPHY OF HEART WITH AORTA, TRANSESOPHAGEAL: ICD-10-PCS | Performed by: ANESTHESIOLOGY

## 2022-11-08 PROCEDURE — 3600000018 HC SURGERY OHS ADDTL 15MIN: Performed by: THORACIC SURGERY (CARDIOTHORACIC VASCULAR SURGERY)

## 2022-11-08 PROCEDURE — 6370000000 HC RX 637 (ALT 250 FOR IP): Performed by: INTERNAL MEDICINE

## 2022-11-08 PROCEDURE — 86900 BLOOD TYPING SEROLOGIC ABO: CPT

## 2022-11-08 PROCEDURE — C1769 GUIDE WIRE: HCPCS

## 2022-11-08 PROCEDURE — 85014 HEMATOCRIT: CPT

## 2022-11-08 PROCEDURE — 83735 ASSAY OF MAGNESIUM: CPT

## 2022-11-08 PROCEDURE — 33533 CABG ARTERIAL SINGLE: CPT | Performed by: PHYSICIAN ASSISTANT

## 2022-11-08 PROCEDURE — P9041 ALBUMIN (HUMAN),5%, 50ML: HCPCS | Performed by: THORACIC SURGERY (CARDIOTHORACIC VASCULAR SURGERY)

## 2022-11-08 PROCEDURE — 2720000010 HC SURG SUPPLY STERILE: Performed by: THORACIC SURGERY (CARDIOTHORACIC VASCULAR SURGERY)

## 2022-11-08 PROCEDURE — 2700000000 HC OXYGEN THERAPY PER DAY

## 2022-11-08 PROCEDURE — 6360000004 HC RX CONTRAST MEDICATION: Performed by: INTERNAL MEDICINE

## 2022-11-08 PROCEDURE — 6370000000 HC RX 637 (ALT 250 FOR IP): Performed by: NURSE ANESTHETIST, CERTIFIED REGISTERED

## 2022-11-08 PROCEDURE — C1894 INTRO/SHEATH, NON-LASER: HCPCS

## 2022-11-08 PROCEDURE — 2500000003 HC RX 250 WO HCPCS: Performed by: THORACIC SURGERY (CARDIOTHORACIC VASCULAR SURGERY)

## 2022-11-08 PROCEDURE — P9016 RBC LEUKOCYTES REDUCED: HCPCS

## 2022-11-08 PROCEDURE — 99223 1ST HOSP IP/OBS HIGH 75: CPT | Performed by: THORACIC SURGERY (CARDIOTHORACIC VASCULAR SURGERY)

## 2022-11-08 PROCEDURE — 86901 BLOOD TYPING SEROLOGIC RH(D): CPT

## 2022-11-08 PROCEDURE — 85520 HEPARIN ASSAY: CPT

## 2022-11-08 PROCEDURE — 85384 FIBRINOGEN ACTIVITY: CPT

## 2022-11-08 PROCEDURE — 80048 BASIC METABOLIC PNL TOTAL CA: CPT

## 2022-11-08 PROCEDURE — 6370000000 HC RX 637 (ALT 250 FOR IP): Performed by: THORACIC SURGERY (CARDIOTHORACIC VASCULAR SURGERY)

## 2022-11-08 PROCEDURE — 027135Z DILATION OF CORONARY ARTERY, TWO ARTERIES WITH TWO DRUG-ELUTING INTRALUMINAL DEVICES, PERCUTANEOUS APPROACH: ICD-10-PCS | Performed by: INTERNAL MEDICINE

## 2022-11-08 PROCEDURE — 85610 PROTHROMBIN TIME: CPT

## 2022-11-08 PROCEDURE — B41F1ZZ FLUOROSCOPY OF RIGHT LOWER EXTREMITY ARTERIES USING LOW OSMOLAR CONTRAST: ICD-10-PCS | Performed by: INTERNAL MEDICINE

## 2022-11-08 RX ORDER — ACETAMINOPHEN 325 MG/1
650 TABLET ORAL EVERY 4 HOURS PRN
Status: DISCONTINUED | OUTPATIENT
Start: 2022-11-08 | End: 2022-11-08

## 2022-11-08 RX ORDER — PROPOFOL 10 MG/ML
INJECTION, EMULSION INTRAVENOUS PRN
Status: DISCONTINUED | OUTPATIENT
Start: 2022-11-08 | End: 2022-11-08 | Stop reason: SDUPTHER

## 2022-11-08 RX ORDER — OXYCODONE HYDROCHLORIDE 5 MG/1
5 TABLET ORAL EVERY 4 HOURS PRN
Status: DISCONTINUED | OUTPATIENT
Start: 2022-11-08 | End: 2022-11-15 | Stop reason: HOSPADM

## 2022-11-08 RX ORDER — PHENYLEPHRINE HYDROCHLORIDE 10 MG/ML
INJECTION INTRAVENOUS PRN
Status: DISCONTINUED | OUTPATIENT
Start: 2022-11-08 | End: 2022-11-08 | Stop reason: SDUPTHER

## 2022-11-08 RX ORDER — SODIUM CHLORIDE 0.9 % (FLUSH) 0.9 %
5-40 SYRINGE (ML) INJECTION EVERY 12 HOURS SCHEDULED
Status: DISCONTINUED | OUTPATIENT
Start: 2022-11-08 | End: 2022-11-15 | Stop reason: HOSPADM

## 2022-11-08 RX ORDER — CEFAZOLIN SODIUM 1 G/3ML
INJECTION, POWDER, FOR SOLUTION INTRAMUSCULAR; INTRAVENOUS PRN
Status: DISCONTINUED | OUTPATIENT
Start: 2022-11-08 | End: 2022-11-08 | Stop reason: SDUPTHER

## 2022-11-08 RX ORDER — SODIUM CHLORIDE 9 MG/ML
INJECTION, SOLUTION INTRAVENOUS PRN
Status: DISCONTINUED | OUTPATIENT
Start: 2022-11-08 | End: 2022-11-08

## 2022-11-08 RX ORDER — CALCIUM CHLORIDE 100 MG/ML
INJECTION INTRAVENOUS; INTRAVENTRICULAR PRN
Status: DISCONTINUED | OUTPATIENT
Start: 2022-11-08 | End: 2022-11-08 | Stop reason: SDUPTHER

## 2022-11-08 RX ORDER — ACETAMINOPHEN 325 MG/1
650 TABLET ORAL EVERY 4 HOURS PRN
Status: DISCONTINUED | OUTPATIENT
Start: 2022-11-08 | End: 2022-11-08 | Stop reason: SDUPTHER

## 2022-11-08 RX ORDER — ENOXAPARIN SODIUM 100 MG/ML
40 INJECTION SUBCUTANEOUS DAILY
Status: DISCONTINUED | OUTPATIENT
Start: 2022-11-09 | End: 2022-11-12

## 2022-11-08 RX ORDER — SODIUM CHLORIDE, SODIUM LACTATE, POTASSIUM CHLORIDE, CALCIUM CHLORIDE 600; 310; 30; 20 MG/100ML; MG/100ML; MG/100ML; MG/100ML
INJECTION, SOLUTION INTRAVENOUS CONTINUOUS PRN
Status: DISCONTINUED | OUTPATIENT
Start: 2022-11-08 | End: 2022-11-08 | Stop reason: SDUPTHER

## 2022-11-08 RX ORDER — SODIUM CHLORIDE 0.9 % (FLUSH) 0.9 %
5-40 SYRINGE (ML) INJECTION EVERY 12 HOURS SCHEDULED
Status: DISCONTINUED | OUTPATIENT
Start: 2022-11-08 | End: 2022-11-08

## 2022-11-08 RX ORDER — AMINOCAPROIC ACID 250 MG/ML
INJECTION, SOLUTION INTRAVENOUS PRN
Status: DISCONTINUED | OUTPATIENT
Start: 2022-11-08 | End: 2022-11-08 | Stop reason: SDUPTHER

## 2022-11-08 RX ORDER — ACETAMINOPHEN 325 MG/1
650 TABLET ORAL EVERY 4 HOURS PRN
Status: DISCONTINUED | OUTPATIENT
Start: 2022-11-08 | End: 2022-11-15 | Stop reason: HOSPADM

## 2022-11-08 RX ORDER — SODIUM CHLORIDE 0.9 % (FLUSH) 0.9 %
5-40 SYRINGE (ML) INJECTION PRN
Status: DISCONTINUED | OUTPATIENT
Start: 2022-11-08 | End: 2022-11-15 | Stop reason: HOSPADM

## 2022-11-08 RX ORDER — ONDANSETRON 4 MG/1
4 TABLET, ORALLY DISINTEGRATING ORAL EVERY 8 HOURS PRN
Status: DISCONTINUED | OUTPATIENT
Start: 2022-11-08 | End: 2022-11-15 | Stop reason: HOSPADM

## 2022-11-08 RX ORDER — ROCURONIUM BROMIDE 10 MG/ML
INJECTION, SOLUTION INTRAVENOUS PRN
Status: DISCONTINUED | OUTPATIENT
Start: 2022-11-08 | End: 2022-11-08 | Stop reason: SDUPTHER

## 2022-11-08 RX ORDER — PROTAMINE SULFATE 10 MG/ML
50 INJECTION, SOLUTION INTRAVENOUS
Status: ACTIVE | OUTPATIENT
Start: 2022-11-08 | End: 2022-11-09

## 2022-11-08 RX ORDER — MORPHINE SULFATE 2 MG/ML
2 INJECTION, SOLUTION INTRAMUSCULAR; INTRAVENOUS
Status: DISCONTINUED | OUTPATIENT
Start: 2022-11-08 | End: 2022-11-12

## 2022-11-08 RX ORDER — FAMOTIDINE 20 MG/1
20 TABLET, FILM COATED ORAL 2 TIMES DAILY
Status: DISCONTINUED | OUTPATIENT
Start: 2022-11-08 | End: 2022-11-15 | Stop reason: HOSPADM

## 2022-11-08 RX ORDER — ALBUMIN, HUMAN INJ 5% 5 %
SOLUTION INTRAVENOUS PRN
Status: DISCONTINUED | OUTPATIENT
Start: 2022-11-08 | End: 2022-11-08 | Stop reason: SDUPTHER

## 2022-11-08 RX ORDER — TAMSULOSIN HYDROCHLORIDE 0.4 MG/1
0.4 CAPSULE ORAL DAILY
Status: DISCONTINUED | OUTPATIENT
Start: 2022-11-09 | End: 2022-11-15 | Stop reason: HOSPADM

## 2022-11-08 RX ORDER — ALBUTEROL SULFATE 90 UG/1
2 AEROSOL, METERED RESPIRATORY (INHALATION) EVERY 6 HOURS PRN
Status: DISCONTINUED | OUTPATIENT
Start: 2022-11-08 | End: 2022-11-12

## 2022-11-08 RX ORDER — SODIUM CHLORIDE 9 MG/ML
INJECTION, SOLUTION INTRAVENOUS CONTINUOUS
Status: DISCONTINUED | OUTPATIENT
Start: 2022-11-08 | End: 2022-11-08

## 2022-11-08 RX ORDER — ALBUMIN, HUMAN INJ 5% 5 %
50 SOLUTION INTRAVENOUS ONCE
Status: COMPLETED | OUTPATIENT
Start: 2022-11-08 | End: 2022-11-09

## 2022-11-08 RX ORDER — DEXTROSE MONOHYDRATE 100 MG/ML
INJECTION, SOLUTION INTRAVENOUS CONTINUOUS PRN
Status: DISCONTINUED | OUTPATIENT
Start: 2022-11-08 | End: 2022-11-12

## 2022-11-08 RX ORDER — OXYCODONE HYDROCHLORIDE 5 MG/1
10 TABLET ORAL EVERY 4 HOURS PRN
Status: DISCONTINUED | OUTPATIENT
Start: 2022-11-08 | End: 2022-11-15 | Stop reason: HOSPADM

## 2022-11-08 RX ORDER — PROTAMINE SULFATE 10 MG/ML
INJECTION, SOLUTION INTRAVENOUS PRN
Status: DISCONTINUED | OUTPATIENT
Start: 2022-11-08 | End: 2022-11-08 | Stop reason: SDUPTHER

## 2022-11-08 RX ORDER — ATORVASTATIN CALCIUM 20 MG/1
20 TABLET, FILM COATED ORAL NIGHTLY
Status: DISCONTINUED | OUTPATIENT
Start: 2022-11-09 | End: 2022-11-15 | Stop reason: HOSPADM

## 2022-11-08 RX ORDER — ASPIRIN 325 MG
325 TABLET ORAL ONCE
Status: DISCONTINUED | OUTPATIENT
Start: 2022-11-08 | End: 2022-11-08

## 2022-11-08 RX ORDER — FAMOTIDINE 10 MG/ML
20 INJECTION, SOLUTION INTRAVENOUS 2 TIMES DAILY
Status: DISCONTINUED | OUTPATIENT
Start: 2022-11-08 | End: 2022-11-11

## 2022-11-08 RX ORDER — POTASSIUM CHLORIDE 29.8 MG/ML
20 INJECTION INTRAVENOUS PRN
Status: DISCONTINUED | OUTPATIENT
Start: 2022-11-08 | End: 2022-11-15 | Stop reason: HOSPADM

## 2022-11-08 RX ORDER — SENNA AND DOCUSATE SODIUM 50; 8.6 MG/1; MG/1
1 TABLET, FILM COATED ORAL 2 TIMES DAILY
Status: DISCONTINUED | OUTPATIENT
Start: 2022-11-08 | End: 2022-11-15 | Stop reason: HOSPADM

## 2022-11-08 RX ORDER — FENTANYL CITRATE 50 UG/ML
INJECTION, SOLUTION INTRAMUSCULAR; INTRAVENOUS PRN
Status: DISCONTINUED | OUTPATIENT
Start: 2022-11-08 | End: 2022-11-08 | Stop reason: SDUPTHER

## 2022-11-08 RX ORDER — ALBUMIN, HUMAN INJ 5% 5 %
25 SOLUTION INTRAVENOUS PRN
Status: DISCONTINUED | OUTPATIENT
Start: 2022-11-08 | End: 2022-11-12

## 2022-11-08 RX ORDER — EPINEPHRINE 1 MG/ML
INJECTION, SOLUTION, CONCENTRATE INTRAVENOUS PRN
Status: DISCONTINUED | OUTPATIENT
Start: 2022-11-08 | End: 2022-11-08 | Stop reason: SDUPTHER

## 2022-11-08 RX ORDER — AMIODARONE HYDROCHLORIDE 200 MG/1
200 TABLET ORAL 2 TIMES DAILY
Status: DISCONTINUED | OUTPATIENT
Start: 2022-11-08 | End: 2022-11-09

## 2022-11-08 RX ORDER — MAGNESIUM SULFATE IN WATER 40 MG/ML
2000 INJECTION, SOLUTION INTRAVENOUS PRN
Status: DISCONTINUED | OUTPATIENT
Start: 2022-11-08 | End: 2022-11-12

## 2022-11-08 RX ORDER — NITROGLYCERIN 20 MG/100ML
5-200 INJECTION INTRAVENOUS CONTINUOUS
Status: DISCONTINUED | OUTPATIENT
Start: 2022-11-08 | End: 2022-11-08

## 2022-11-08 RX ORDER — ONDANSETRON 2 MG/ML
4 INJECTION INTRAMUSCULAR; INTRAVENOUS EVERY 6 HOURS PRN
Status: DISCONTINUED | OUTPATIENT
Start: 2022-11-08 | End: 2022-11-09

## 2022-11-08 RX ORDER — SENNOSIDES 8.8 MG/5ML
10 LIQUID ORAL DAILY PRN
Status: DISCONTINUED | OUTPATIENT
Start: 2022-11-08 | End: 2022-11-15 | Stop reason: HOSPADM

## 2022-11-08 RX ORDER — PAPAVERINE HYDROCHLORIDE 30 MG/ML
INJECTION INTRAMUSCULAR; INTRAVENOUS PRN
Status: DISCONTINUED | OUTPATIENT
Start: 2022-11-08 | End: 2022-11-08 | Stop reason: ALTCHOICE

## 2022-11-08 RX ORDER — HEPARIN SODIUM 1000 [USP'U]/ML
INJECTION, SOLUTION INTRAVENOUS; SUBCUTANEOUS PRN
Status: DISCONTINUED | OUTPATIENT
Start: 2022-11-08 | End: 2022-11-08 | Stop reason: SDUPTHER

## 2022-11-08 RX ORDER — SODIUM CHLORIDE 9 MG/ML
INJECTION, SOLUTION INTRAVENOUS CONTINUOUS
Status: DISCONTINUED | OUTPATIENT
Start: 2022-11-08 | End: 2022-11-14

## 2022-11-08 RX ORDER — MULTIVITAMIN WITH IRON
1 TABLET ORAL
Status: DISCONTINUED | OUTPATIENT
Start: 2022-11-09 | End: 2022-11-15 | Stop reason: HOSPADM

## 2022-11-08 RX ORDER — METOPROLOL TARTRATE 5 MG/5ML
2.5 INJECTION INTRAVENOUS EVERY 10 MIN PRN
Status: DISCONTINUED | OUTPATIENT
Start: 2022-11-08 | End: 2022-11-12

## 2022-11-08 RX ORDER — SODIUM CHLORIDE 0.9 % (FLUSH) 0.9 %
5-40 SYRINGE (ML) INJECTION PRN
Status: DISCONTINUED | OUTPATIENT
Start: 2022-11-08 | End: 2022-11-08

## 2022-11-08 RX ORDER — SODIUM CHLORIDE, SODIUM GLUCONATE, SODIUM ACETATE, POTASSIUM CHLORIDE AND MAGNESIUM CHLORIDE 526; 502; 368; 37; 30 MG/100ML; MG/100ML; MG/100ML; MG/100ML; MG/100ML
INJECTION, SOLUTION INTRAVENOUS CONTINUOUS PRN
Status: DISCONTINUED | OUTPATIENT
Start: 2022-11-08 | End: 2022-11-08 | Stop reason: SDUPTHER

## 2022-11-08 RX ORDER — INDOCYANINE GREEN AND WATER 25 MG
KIT INJECTION PRN
Status: DISCONTINUED | OUTPATIENT
Start: 2022-11-08 | End: 2022-11-08 | Stop reason: ALTCHOICE

## 2022-11-08 RX ORDER — ENALAPRILAT 2.5 MG/2ML
0.62 INJECTION INTRAVENOUS
Status: DISCONTINUED | OUTPATIENT
Start: 2022-11-08 | End: 2022-11-12

## 2022-11-08 RX ORDER — MIDAZOLAM HYDROCHLORIDE 1 MG/ML
INJECTION INTRAMUSCULAR; INTRAVENOUS PRN
Status: DISCONTINUED | OUTPATIENT
Start: 2022-11-08 | End: 2022-11-08 | Stop reason: SDUPTHER

## 2022-11-08 RX ORDER — NITROGLYCERIN 20 MG/100ML
INJECTION INTRAVENOUS
Status: DISCONTINUED
Start: 2022-11-08 | End: 2022-11-08

## 2022-11-08 RX ORDER — SODIUM CHLORIDE 9 MG/ML
INJECTION, SOLUTION INTRAVENOUS PRN
Status: DISCONTINUED | OUTPATIENT
Start: 2022-11-08 | End: 2022-11-12

## 2022-11-08 RX ADMIN — SODIUM CHLORIDE, SODIUM LACTATE, POTASSIUM CHLORIDE, CALCIUM CHLORIDE: 600; 310; 30; 20 INJECTION, SOLUTION INTRAVENOUS at 18:41

## 2022-11-08 RX ADMIN — FENTANYL CITRATE 100 MCG: 50 INJECTION INTRAMUSCULAR; INTRAVENOUS at 21:22

## 2022-11-08 RX ADMIN — FENTANYL CITRATE 100 MCG: 50 INJECTION INTRAMUSCULAR; INTRAVENOUS at 18:50

## 2022-11-08 RX ADMIN — Medication 7 MCG/MIN: at 22:55

## 2022-11-08 RX ADMIN — MIDAZOLAM 2 MG: 1 INJECTION INTRAMUSCULAR; INTRAVENOUS at 22:00

## 2022-11-08 RX ADMIN — ALBUMIN (HUMAN) 12.5 G: 12.5 INJECTION, SOLUTION INTRAVENOUS at 22:10

## 2022-11-08 RX ADMIN — FENTANYL CITRATE 100 MCG: 50 INJECTION INTRAMUSCULAR; INTRAVENOUS at 20:59

## 2022-11-08 RX ADMIN — AMINOCAPROIC ACID 5000 MG: 250 INJECTION, SOLUTION INTRAVENOUS at 18:47

## 2022-11-08 RX ADMIN — ROCURONIUM BROMIDE 20 MG: 10 INJECTION, SOLUTION INTRAVENOUS at 19:14

## 2022-11-08 RX ADMIN — PROTAMINE SULFATE 170 MG: 10 INJECTION, SOLUTION INTRAVENOUS at 21:16

## 2022-11-08 RX ADMIN — SODIUM CHLORIDE, PRESERVATIVE FREE 10 ML: 5 INJECTION INTRAVENOUS at 23:00

## 2022-11-08 RX ADMIN — FENTANYL CITRATE 50 MCG: 50 INJECTION INTRAMUSCULAR; INTRAVENOUS at 18:13

## 2022-11-08 RX ADMIN — FENTANYL CITRATE 50 MCG: 50 INJECTION INTRAMUSCULAR; INTRAVENOUS at 18:05

## 2022-11-08 RX ADMIN — MIDAZOLAM 2 MG: 1 INJECTION INTRAMUSCULAR; INTRAVENOUS at 22:02

## 2022-11-08 RX ADMIN — HEPARIN SODIUM 25000 UNITS: 1000 INJECTION INTRAVENOUS; SUBCUTANEOUS at 19:32

## 2022-11-08 RX ADMIN — ALBUMIN (HUMAN) 12.5 G: 12.5 INJECTION, SOLUTION INTRAVENOUS at 21:26

## 2022-11-08 RX ADMIN — SODIUM CHLORIDE, SODIUM GLUCONATE, SODIUM ACETATE, POTASSIUM CHLORIDE AND MAGNESIUM CHLORIDE: 526; 502; 368; 37; 30 INJECTION, SOLUTION INTRAVENOUS at 20:04

## 2022-11-08 RX ADMIN — EPINEPHRINE 20 MCG: 1 INJECTION, SOLUTION, CONCENTRATE INTRAVENOUS at 18:37

## 2022-11-08 RX ADMIN — ROCURONIUM BROMIDE 10 MG: 10 INJECTION, SOLUTION INTRAVENOUS at 20:06

## 2022-11-08 RX ADMIN — HEPARIN SODIUM 5000 UNITS: 1000 INJECTION INTRAVENOUS; SUBCUTANEOUS at 18:57

## 2022-11-08 RX ADMIN — ROCURONIUM BROMIDE 50 MG: 10 INJECTION, SOLUTION INTRAVENOUS at 18:17

## 2022-11-08 RX ADMIN — SODIUM CHLORIDE, SODIUM GLUCONATE, SODIUM ACETATE, POTASSIUM CHLORIDE AND MAGNESIUM CHLORIDE: 526; 502; 368; 37; 30 INJECTION, SOLUTION INTRAVENOUS at 18:40

## 2022-11-08 RX ADMIN — SODIUM CHLORIDE, POTASSIUM CHLORIDE, SODIUM LACTATE AND CALCIUM CHLORIDE: 600; 310; 30; 20 INJECTION, SOLUTION INTRAVENOUS at 19:48

## 2022-11-08 RX ADMIN — ALBUMIN (HUMAN) 12.5 G: 12.5 INJECTION, SOLUTION INTRAVENOUS at 21:19

## 2022-11-08 RX ADMIN — EPINEPHRINE 20 MCG: 1 INJECTION, SOLUTION, CONCENTRATE INTRAVENOUS at 19:11

## 2022-11-08 RX ADMIN — SODIUM BICARBONATE 50 MEQ: 84 INJECTION INTRAVENOUS at 23:58

## 2022-11-08 RX ADMIN — ACETAMINOPHEN 650 MG: 325 TABLET ORAL at 16:57

## 2022-11-08 RX ADMIN — IOPAMIDOL 400 ML: 755 INJECTION, SOLUTION INTRAVENOUS at 14:57

## 2022-11-08 RX ADMIN — FENTANYL CITRATE 50 MCG: 50 INJECTION INTRAMUSCULAR; INTRAVENOUS at 19:15

## 2022-11-08 RX ADMIN — EPINEPHRINE 0.33 MCG/MIN: 1 INJECTION INTRAMUSCULAR; INTRAVENOUS; SUBCUTANEOUS at 19:26

## 2022-11-08 RX ADMIN — EPINEPHRINE 10 MCG: 1 INJECTION, SOLUTION, CONCENTRATE INTRAVENOUS at 19:19

## 2022-11-08 RX ADMIN — ALBUMIN (HUMAN) 50 G: 12.5 INJECTION, SOLUTION INTRAVENOUS at 22:57

## 2022-11-08 RX ADMIN — EPINEPHRINE 10 MCG: 1 INJECTION, SOLUTION, CONCENTRATE INTRAVENOUS at 19:48

## 2022-11-08 RX ADMIN — SODIUM CHLORIDE: 9 INJECTION, SOLUTION INTRAVENOUS at 23:14

## 2022-11-08 RX ADMIN — SODIUM CHLORIDE 4 UNITS/HR: 9 INJECTION, SOLUTION INTRAVENOUS at 22:55

## 2022-11-08 RX ADMIN — Medication 2 UNITS/HR: at 20:34

## 2022-11-08 RX ADMIN — SODIUM CHLORIDE, POTASSIUM CHLORIDE, SODIUM LACTATE AND CALCIUM CHLORIDE: 600; 310; 30; 20 INJECTION, SOLUTION INTRAVENOUS at 18:34

## 2022-11-08 RX ADMIN — EPINEPHRINE 10 MCG: 1 INJECTION, SOLUTION, CONCENTRATE INTRAVENOUS at 20:45

## 2022-11-08 RX ADMIN — MIDAZOLAM 1 MG: 1 INJECTION INTRAMUSCULAR; INTRAVENOUS at 18:05

## 2022-11-08 RX ADMIN — NITROGLYCERIN 5 MCG/MIN: 20 INJECTION INTRAVENOUS at 15:34

## 2022-11-08 RX ADMIN — EPINEPHRINE 10 MCG: 1 INJECTION, SOLUTION, CONCENTRATE INTRAVENOUS at 20:24

## 2022-11-08 RX ADMIN — SODIUM BICARBONATE 50 MEQ: 84 INJECTION INTRAVENOUS at 23:19

## 2022-11-08 RX ADMIN — PHENYLEPHRINE HYDROCHLORIDE 200 MCG: 10 INJECTION INTRAVENOUS at 18:38

## 2022-11-08 RX ADMIN — PHENYLEPHRINE HYDROCHLORIDE 200 MCG: 10 INJECTION INTRAVENOUS at 18:33

## 2022-11-08 RX ADMIN — SODIUM CHLORIDE, SODIUM GLUCONATE, SODIUM ACETATE, POTASSIUM CHLORIDE AND MAGNESIUM CHLORIDE: 526; 502; 368; 37; 30 INJECTION, SOLUTION INTRAVENOUS at 21:41

## 2022-11-08 RX ADMIN — SODIUM BICARBONATE 50 MEQ: 84 INJECTION, SOLUTION INTRAVENOUS at 21:41

## 2022-11-08 RX ADMIN — EPINEPHRINE 20 MCG: 1 INJECTION, SOLUTION, CONCENTRATE INTRAVENOUS at 19:29

## 2022-11-08 RX ADMIN — SODIUM CHLORIDE: 9 INJECTION, SOLUTION INTRAVENOUS at 10:07

## 2022-11-08 RX ADMIN — ALBUMIN (HUMAN) 12.5 G: 12.5 INJECTION, SOLUTION INTRAVENOUS at 20:28

## 2022-11-08 RX ADMIN — FENTANYL CITRATE 50 MCG: 50 INJECTION INTRAMUSCULAR; INTRAVENOUS at 18:25

## 2022-11-08 RX ADMIN — CALCIUM CHLORIDE 1 G: 100 INJECTION, SOLUTION INTRAVENOUS at 21:38

## 2022-11-08 RX ADMIN — ALBUMIN (HUMAN) 12.5 G: 12.5 INJECTION, SOLUTION INTRAVENOUS at 21:42

## 2022-11-08 RX ADMIN — PROPOFOL 100 MG: 10 INJECTION, EMULSION INTRAVENOUS at 18:17

## 2022-11-08 RX ADMIN — CEFAZOLIN 2 G: 1 INJECTION, POWDER, FOR SOLUTION INTRAMUSCULAR; INTRAVENOUS at 18:37

## 2022-11-08 ASSESSMENT — PAIN SCALES - GENERAL: PAINLEVEL_OUTOF10: 5

## 2022-11-08 ASSESSMENT — PAIN DESCRIPTION - LOCATION: LOCATION: HEAD

## 2022-11-08 ASSESSMENT — ENCOUNTER SYMPTOMS
ABDOMINAL PAIN: 0
EYE DISCHARGE: 0
COLOR CHANGE: 0
SHORTNESS OF BREATH: 1
CHEST TIGHTNESS: 1

## 2022-11-08 ASSESSMENT — PULMONARY FUNCTION TESTS: PIF_VALUE: 27

## 2022-11-08 ASSESSMENT — PAIN DESCRIPTION - ORIENTATION: ORIENTATION: MID

## 2022-11-08 ASSESSMENT — PAIN DESCRIPTION - DESCRIPTORS: DESCRIPTORS: PRESSURE

## 2022-11-08 NOTE — H&P
[address] [address]  Sedation/Analgesia History & Physical    Pt Name: Angela Fregoso  Account number: [de-identified]  MRN: [id number]  YOB: 1945  Provider Performing Procedure: Rafael Carrasco MD MD [facility] [place]  Primary Care Physician: Julius Leon MD  Date: 11/8/2022    PRE-PROCEDURE    Code Status: FULL CODE  Brief History/Pre-Procedure Diagnosis: angina iii, referred for PCI for abnormal cath    Consent: : I have discussed with the patient risks, benefits, and alternatives (and relevant risks, benefits, and side effects related to alternatives or not receiving care), and likelihood of the success. The patient and/or representative appear to understand and agree to proceed. The discussion encompasses risks, benefits, and side effects related to the alternatives and the risks related to not receiving the proposed care, treatment, and services. PLANNED PROCEDURE   [x]Cath  [x]PCI                []Pacemaker/AICD  []RAYA             []Cardioversion []Peripheral angiography/PTA  []Other:      VITAL SIGNS   Vitals:    11/08/22 1002   BP:    Pulse: 71   Resp:    Temp:    SpO2:        PHYSICAL:   General: No acute distress  HEENT:  Unremarkable for age  Neck: without increased JVD, carotid pulses 2+ bilaterally without bruits  Heart: RRR, S1 & S2 WNL   Lungs: Clear to auscultation    Abdomen: BS present, without HSM, masses, or tenderness    Extremities: without C,C,E.  Pulses 2+ bilaterally  Mental Status: Alert & Oriented    SEDATION  Planned agent:[x]Midazolam []Meperidine []Sublimaze []Morphine  []Diazepam  [x]Other: fentanyl      ASA Classification:  []1 []2 [x]3 []4 []5  Class 1: A normal healthy patient  Class 2: Pt with mild to moderate systemic disease  Class 3: Severe systemic disease or disturbance  Class 4: Severe systemic disorders that are already life threatening. Class 5: Moribund pt with little chances of survival, for more than 24 hours.   Mallampati I Airway Classification: []1 []2 [x]3 []4          MEDICAL HISTORY   has a past medical history of Anxiety, Bradycardia, Heart disease, Hyperlipidemia, Hypertension, Medtronic linq loop recorder, Neuropathy, Paroxysmal atrial fibrillation ([facility] [address] Spondylolisthesis, and Syncopal episodes. []Additional information:          SURGICAL HISTORY   has a past surgical history that includes Eye surgery; hernia repair; back surgery (2008); Cholecystectomy; angioplasty (07' 10'); Tonsillectomy; Knee arthroscopy (1990); eye surgery; Coronary angioplasty with stent (5599,9692); TURP (2015); Shoulder arthroscopy; and Cataract removal (05/04/2021). Additional information:       ALLERGIES   Allergies as of 11/08/2022    (No Known Allergies)     Additional information:       MEDICATIONS     Current Facility-Administered Medications:     0.9 % sodium chloride infusion, , IntraVENous, Continuous, Vida Godoy PA-C, Last Rate: 75 mL/hr at 11/08/22 1007, New Bag at 11/08/22 1007    sodium chloride flush 0.9 % injection 5-40 mL, 5-40 mL, IntraVENous, 2 times per day, Vida Godoy PA-C    sodium chloride flush 0.9 % injection 5-40 mL, 5-40 mL, IntraVENous, PRN, Vida Godoy PA-C    aspirin tablet 325 mg, 325 mg, Oral, Once, Vida Godoy PA-C  Prior to Admission medications    Medication Sig Start Date End Date Taking?  Authorizing Provider   pantoprazole sodium (PROTONIX) 40 MG PACK packet Take 40 mg by mouth every morning (before breakfast)    Historical Provider, MD   irbesartan-hydroCHLOROthiazide (AVALIDE) 150-12.5 MG per tablet Take 1 tablet once a day 10/19/22   Devi Conteh MD   clopidogrel (PLAVIX) 75 MG tablet Take 1 tablet by mouth once daily 10/19/22   Devi Conteh MD   pravastatin (PRAVACHOL) 80 MG tablet Take 1 tablet by mouth once daily 10/19/22   Devi Conteh MD   potassium chloride (KLOR-CON M) 20 MEQ extended release tablet Take 1 tablet by mouth twice daily 10/19/22   Devi Conteh MD   sotalol (BETAPACE) 80 MG tablet Take 1 tablet by mouth daily 10/19/22   Abbi Brown MD   tadalafil (CIALIS) 10 MG tablet Take 1 tablet by mouth daily as needed for Erectile Dysfunction  Patient not taking: Reported on 11/8/2022 10/21/20   Abbi Brown MD   pregabalin (LYRICA) 50 MG capsule Take 100 mg by mouth 3 times daily. Historical Provider, MD   Cyanocobalamin (VITAMIN B 12 PO) Take 1,000 mg by mouth daily    Historical Provider, MD   nitroGLYCERIN (NITROSTAT) 0.4 MG SL tablet Place 1 tablet under the tongue every 5 minutes as needed (PRN) 3/15/19   Abbi Brown MD   DULoxetine (CYMBALTA) 30 MG extended release capsule Take 60 mg by mouth daily     Historical Provider, MD   oxybutynin (DITROPAN) 5 MG tablet Take 5 mg by mouth nightly    Historical Provider, MD   tamsulosin (FLOMAX) 0.4 MG capsule Take 0.4 mg by mouth daily    Historical Provider, MD   aspirin 81 MG tablet Take 81 mg by mouth 2 times daily    Historical Provider, MD   rOPINIRole (REQUIP) 0.5 MG tablet Take 0.5 mg by mouth as needed (restless leg syndrome)   Patient not taking: Reported on 11/8/2022    Historical Provider, MD   esomeprazole (NEXIUM) 40 MG capsule Take 40 mg by mouth daily     Historical Provider, MD   tizanidine (ZANAFLEX) 4 MG tablet Take 4 mg by mouth every 6 hours as needed.     Patient not taking: Reported on 11/8/2022    Historical Provider, MD   fish oil-omega-3 fatty acids 1000 MG capsule Take 1 g by mouth daily     Historical Provider, MD     Additional information:                 Jamal Nielsen MD MD [facility] [place]  Electronically signed 11/8/2022 at 11:25 AM

## 2022-11-08 NOTE — FLOWSHEET NOTE
0900 Patient admitted to 2E17  Ambulatory for heart catherization  Patient NPO. Patient accompanied by significant other  Vital signs obtained. Assessment and data collection intiated. Oriented to room. Policies and procedures for 2E explained. All questions answered with no further questions at this time. Fall precautions reviewed with patient. 0900 Care plan reviewed with patient and significant other. Patient and significant other verbalize understanding of the plan of care and contribute to goal setting. 1115 to cath lab per bed, stable condition. Updated family throughout the case. 1455 care taken over from cath lab, 6 fr sheath remains in right groin with pressure bag / IV per protocol. Flushed. Site is free from bleeding or hematoma. 1510 patient  developed chest pressure of 2 out of 10. EKG performed. Notified Dr Tk Ridley. Started nitro 5mcg    1540 patient pain progressed to sharp pain 6/10. Increased nitro gtt to 10 mcg. Notified dr cantu    1610 dr Tk Ridley at bedside    1700 Dr Michelle Mcbride at bedside. Patient need emergent open heart surgery.  2304 patient transported monitored to surgery preop area.

## 2022-11-08 NOTE — ANESTHESIA PRE PROCEDURE
Department of Anesthesiology  Preprocedure Note       Name:  Sunny Jaimes   Age:  78 y.o.  :  1945                                          MRN:  [id number]         Date:  2022      Surgeon: Chanel Maya):  Johnny River MD    Procedure: Procedure(s):  CABG x2 RAYA    Medications prior to admission:   Prior to Admission medications    Medication Sig Start Date End Date Taking? Authorizing Provider   pantoprazole sodium (PROTONIX) 40 MG PACK packet Take 40 mg by mouth every morning (before breakfast)    Historical Provider, MD   irbesartan-hydroCHLOROthiazide (AVALIDE) 150-12.5 MG per tablet Take 1 tablet once a day 10/19/22   Mina Cui MD   clopidogrel (PLAVIX) 75 MG tablet Take 1 tablet by mouth once daily 10/19/22   Mina Cui MD   pravastatin (PRAVACHOL) 80 MG tablet Take 1 tablet by mouth once daily 10/19/22   Mina Cui MD   potassium chloride (KLOR-CON M) 20 MEQ extended release tablet Take 1 tablet by mouth twice daily 10/19/22   Mina Cui MD   sotalol (BETAPACE) 80 MG tablet Take 1 tablet by mouth daily 10/19/22   Mnia Cui MD   tadalafil (CIALIS) 10 MG tablet Take 1 tablet by mouth daily as needed for Erectile Dysfunction  Patient not taking: Reported on 2022 10/21/20   Mina Cui MD   pregabalin (LYRICA) 50 MG capsule Take 100 mg by mouth 3 times daily.     Historical Provider, MD   Cyanocobalamin (VITAMIN B 12 PO) Take 1,000 mg by mouth daily    Historical Provider, MD   nitroGLYCERIN (NITROSTAT) 0.4 MG SL tablet Place 1 tablet under the tongue every 5 minutes as needed (PRN) 3/15/19   Mina Cui MD   DULoxetine (CYMBALTA) 30 MG extended release capsule Take 60 mg by mouth daily     Historical Provider, MD   oxybutynin (DITROPAN) 5 MG tablet Take 5 mg by mouth nightly    Historical Provider, MD   tamsulosin (FLOMAX) 0.4 MG capsule Take 0.4 mg by mouth daily    Historical Provider, MD   aspirin 81 MG tablet Take 81 mg by mouth 2 times daily    Historical Provider, MD   rOPINIRole (REQUIP) 0.5 MG tablet Take 0.5 mg by mouth as needed (restless leg syndrome)   Patient not taking: Reported on 11/8/2022    Historical Provider, MD   esomeprazole (NEXIUM) 40 MG capsule Take 40 mg by mouth daily     Historical Provider, MD   tizanidine (ZANAFLEX) 4 MG tablet Take 4 mg by mouth every 6 hours as needed.     Patient not taking: Reported on 11/8/2022    Historical Provider, MD   fish oil-omega-3 fatty acids 1000 MG capsule Take 1 g by mouth daily     Historical Provider, MD       Current medications:    Current Facility-Administered Medications   Medication Dose Route Frequency Provider Last Rate Last Admin    0.9 % sodium chloride infusion   IntraVENous Continuous Ivanna Morton PA-C 75 mL/hr at 11/08/22 1007 New Bag at 11/08/22 1007    sodium chloride flush 0.9 % injection 5-40 mL  5-40 mL IntraVENous 2 times per day Ivanna Morton PA-C        sodium chloride flush 0.9 % injection 5-40 mL  5-40 mL IntraVENous PRN Ivanna Morton PA-C        aspirin tablet 325 mg  325 mg Oral Once Ivanna Morton PA-C        nitroGLYCERIN 50 mg in dextrose 5% 250 mL infusion  5-200 mcg/min IntraVENous Continuous Humphrey Jacobson MD 3 mL/hr at 11/08/22 1551 10 mcg/min at 11/08/22 1551    nitroGLYCERIN 200-5 MCG/ML-% infusion             acetaminophen (TYLENOL) tablet 650 mg  650 mg Oral Q4H PRN Humphrey Jacobson MD   650 mg at 11/08/22 1657    sodium chloride flush 0.9 % injection 5-40 mL  5-40 mL IntraVENous 2 times per day Humphrey Jacobson MD        sodium chloride flush 0.9 % injection 5-40 mL  5-40 mL IntraVENous PRN Perico Oconnell MD        0.9 % sodium chloride infusion   IntraVENous PRN Humphrey Jacobson MD        acetaminophen (TYLENOL) tablet 650 mg  650 mg Oral Q4H PRN Perico Oconnell MD        0.9 % sodium chloride infusion   IntraVENous Continuous Humphrey Jacobson MD           Allergies:  No Known Allergies    Problem List:    Patient Active Problem List   Diagnosis Code    Hypertension I10    Hyperlipidemia E78.5    Anxiety F41.9    Abnormal findings on cardiac catheterization R93.1    Spondylolisthesis M43.10    CAD (coronary artery disease) cath 2011 40 to 50% stenosis in mid LAD, Patent diagonal stent,40 to 50% Mid Lcx stenosis,patent RCA stent EF 55% I25.10    GERD (gastroesophageal reflux disease) K21.9    Syncopal episodes R55    Paroxysmal atrial fibrillation (HCC) I48.0    Medtronic linq loop recorder Z95.818    Bradycardia R00.1    Angina of effort ([address] I20.8    Status post angioplasty with stent Z95.820       Past Medical History:        Diagnosis Date    Anxiety     Bradycardia 2016    Heart disease     Hyperlipidemia     Hypertension     Medtronic linq loop recorder 2016    Neuropathy     Paroxysmal atrial fibrillation ([address] 2015    Spondylolisthesis     Syncopal episodes 2015       Past Surgical History:        Procedure Laterality Date    ANGIOPLASTY  07' 10'    x5    BACK SURGERY      CATARACT REMOVAL  2021    CHOLECYSTECTOMY      CORONARY ANGIOPLASTY WITH STENT PLACEMENT  [id number],[id number]    EYE SURGERY      EYE SURGERY      HERNIA REPAIR      KNEE ARTHROSCOPY  1990,     SHOULDER ARTHROSCOPY      TONSILLECTOMY      TURP         Social History:    Social History     Tobacco Use    Smoking status: Former     Packs/day: 5.00     Years: 15.00     Pack years: 75.00     Types: Cigarettes     Quit date: 2000     Years since quittin.7    Smokeless tobacco: Never   Substance Use Topics    Alcohol use: Yes     Comment: couple beers a night                                Counseling given: Not Answered      Vital Signs (Current):   Vitals:    22 1540 22 1555 22 1625 22 1655   BP: (!) 147/85 (!) 145/97  (!) 141/87   Pulse: 68 67 61 62   Resp: 10 19 11 15   Temp:       TempSrc:       SpO2: 96% 94% 94% 96%   Weight:       Height: BP Readings from Last 3 Encounters:   11/08/22 (!) 141/87   10/26/22 131/70   10/19/22 138/76       NPO Status:                                                                                 BMI:   Wt Readings from Last 3 Encounters:   11/08/22 234 lb (106.1 kg)   10/26/22 234 lb (106.1 kg)   10/19/22 234 lb 9.6 oz (106.4 kg)     Body mass index is 37.77 kg/m². CBC:   Lab Results   Component Value Date/Time    WBC 9.2 11/08/2022 09:07 AM    RBC 4.86 11/08/2022 09:07 AM    HGB 15.9 11/08/2022 09:07 AM    HCT 45.4 11/08/2022 09:07 AM    MCV 93.4 11/08/2022 09:07 AM    RDW 14.1 09/23/2015 11:33 AM     11/08/2022 09:07 AM       CMP:   Lab Results   Component Value Date/Time     11/08/2022 09:07 AM    K 4.4 11/08/2022 09:07 AM     11/08/2022 09:07 AM    CO2 26 11/08/2022 09:07 AM    BUN 16 11/08/2022 09:07 AM    CREATININE 1.1 11/08/2022 09:07 AM    LABGLOM >60 11/08/2022 08:44 AM    GLUCOSE 133 11/08/2022 09:07 AM    PROT 7.2 09/23/2015 11:33 AM    CALCIUM 9.9 11/08/2022 09:07 AM    BILITOT 0.6 09/23/2015 11:33 AM    ALKPHOS 53 09/23/2015 11:33 AM    AST 24 09/23/2015 11:33 AM    ALT 26 09/23/2015 11:33 AM       POC Tests: No results for input(s): POCGLU, POCNA, POCK, POCCL, POCBUN, POCHEMO, POCHCT in the last 72 hours.     Coags:   Lab Results   Component Value Date/Time    INR 0.95 11/08/2022 09:07 AM    APTT 44.0 11/08/2022 09:07 AM       HCG (If Applicable): No results found for: PREGTESTUR, PREGSERUM, HCG, HCGQUANT     ABGs: No results found for: PHART, PO2ART, RAR4AGE, PPW2VIF, BEART, [id number]     Type & Screen (If Applicable):  Lab Results   Component Value Date    LABRH POS 11/08/2022       Drug/Infectious Status (If Applicable):  No results found for: HIV, HEPCAB    COVID-19 Screening (If Applicable): No results found for: COVID19        Anesthesia Evaluation    Airway: Mallampati: II  TM distance: >3 FB   Neck ROM: full  Mouth opening: > = 3 FB   Dental:          Pulmonary:   (+) decreased breath sounds                            Cardiovascular:    (+) hypertension:, angina:, CAD:, dysrhythmias: atrial fibrillation,         Rhythm: regular                      Neuro/Psych:               GI/Hepatic/Renal:   (+) GERD:,           Endo/Other:    (+) : arthritis:., .                 Abdominal:   (+) obese,           Vascular: Other Findings:           Anesthesia Plan      general     ASA 4 - emergent     (Pt.'s long term female partner with pt. A LINE CENTRAL LINE PA CATHETER RISKS BENEFITS AND ALTERNATES DISCUSSED . PT. CONSENTED. PT. DENIES UPPER GI SYMPTOMS   INTRA OP RAYA RISKS BENEFITS AND ALTERNATES DISCUSSED   PT. CONSENTED. INTRA OP RAYA WAS REQUESTED BY DR Jung De La Fuente    )  Induction: intravenous. arterial line, BIS, central line, CVP, PA catheter and RAYA  MIPS: Postoperative ventilation. Anesthetic plan and risks discussed with patient. Use of blood products discussed with patient whom consented to blood products. Plan discussed with CRNA.                     Michoacano Farias MD   11/8/2022

## 2022-11-08 NOTE — CONSULTS
Cardiothoracic Surgery History & Physical       Patient:  Clarisse James  YOB: 1945    MRN: [id number]     Acct: [de-identified]    PCP: Theodore Mendoza MD    Date of Admission: 11/8/2022    Chief Complaint:  Chest pain    History Of Present Illness:  78 y.o. male, s/p multiple PCI to LAD and transapical diagonal, originally presenting with exertional left parasternal, non-radiating chest pain relieved by rest, along with chronic progressive fatigue, underwent attempted PCI today. Procedure terminated with malaligned stent in LAD. Patient symptomatic with unstable angina after the procedure. Patient on chronic clopidogrel. History also of paroxysmal atrial fibrillation, controlled with sotalol. Past Medical History:          Diagnosis Date    Anxiety     Bradycardia 4/8/2016    Heart disease     Hyperlipidemia     Hypertension     [employer] linq loop recorder 4/8/2016    Neuropathy     Paroxysmal atrial fibrillation ([address] 9/23/2015    Spondylolisthesis     Syncopal episodes 9/23/2015       Past Surgical History:          Procedure Laterality Date    ANGIOPLASTY  07' 10'    x5    BACK SURGERY  2008    CATARACT REMOVAL  05/04/2021    CHOLECYSTECTOMY      CORONARY ANGIOPLASTY WITH STENT PLACEMENT  [id number]    EYE SURGERY      EYE SURGERY      HERNIA REPAIR      KNEE ARTHROSCOPY  1990 1990, 2021    SHOULDER ARTHROSCOPY      TONSILLECTOMY      TURP 2015       Medications Prior to Admission:      Prior to Admission medications    Medication Sig Start Date End Date Taking?  Authorizing Provider   pantoprazole sodium (PROTONIX) 40 MG PACK packet Take 40 mg by mouth every morning (before breakfast)    Historical Provider, MD   irbesartan-hydroCHLOROthiazide (AVALIDE) 150-12.5 MG per tablet Take 1 tablet once a day 10/19/22   Shoaib Pro MD   clopidogrel (PLAVIX) 75 MG tablet Take 1 tablet by mouth once daily 10/19/22   Shoaib Pro MD   pravastatin (PRAVACHOL) 80 MG tablet Take 1 tablet by mouth once daily 10/19/22   Irene Coello MD   potassium chloride (KLOR-CON M) 20 MEQ extended release tablet Take 1 tablet by mouth twice daily 10/19/22   Irene Coello MD   sotalol (BETAPACE) 80 MG tablet Take 1 tablet by mouth daily 10/19/22   Irene Coello MD   tadalafil (CIALIS) 10 MG tablet Take 1 tablet by mouth daily as needed for Erectile Dysfunction  Patient not taking: Reported on 11/8/2022 10/21/20   Irene Coello MD   pregabalin (LYRICA) 50 MG capsule Take 100 mg by mouth 3 times daily. Historical Provider, MD   Cyanocobalamin (VITAMIN B 12 PO) Take 1,000 mg by mouth daily    Historical Provider, MD   nitroGLYCERIN (NITROSTAT) 0.4 MG SL tablet Place 1 tablet under the tongue every 5 minutes as needed (PRN) 3/15/19   Irene Coello MD   DULoxetine (CYMBALTA) 30 MG extended release capsule Take 60 mg by mouth daily     Historical Provider, MD   oxybutynin (DITROPAN) 5 MG tablet Take 5 mg by mouth nightly    Historical Provider, MD   tamsulosin (FLOMAX) 0.4 MG capsule Take 0.4 mg by mouth daily    Historical Provider, MD   aspirin 81 MG tablet Take 81 mg by mouth 2 times daily    Historical Provider, MD   rOPINIRole (REQUIP) 0.5 MG tablet Take 0.5 mg by mouth as needed (restless leg syndrome)   Patient not taking: Reported on 11/8/2022    Historical Provider, MD   esomeprazole (NEXIUM) 40 MG capsule Take 40 mg by mouth daily     Historical Provider, MD   tizanidine (ZANAFLEX) 4 MG tablet Take 4 mg by mouth every 6 hours as needed. Patient not taking: Reported on 11/8/2022    Historical Provider, MD   fish oil-omega-3 fatty acids 1000 MG capsule Take 1 g by mouth daily     Historical Provider, MD       Allergies:  Patient has no known allergies. Social History:      TOBACCO:   reports that he quit smoking about 22 years ago. His smoking use included cigarettes. He has a 75.00 pack-year smoking history.  He has never used smokeless tobacco.  ETOH:   reports current alcohol use. Social History     Substance and Sexual Activity   Drug Use No         Family History:          Problem Relation Age of Onset    Heart Disease Mother 62        CABG    High Cholesterol Mother     Cancer Mother     Heart Disease Father 61        stents    Cancer Father     Cancer Brother     Asthma Brother        REVIEW OFSYSTEMS:      Review of Systems   Constitutional:  Positive for activity change and fatigue. HENT:  Negative for congestion. Eyes:  Negative for discharge. Respiratory:  Positive for chest tightness and shortness of breath. Cardiovascular:  Positive for chest pain. Negative for palpitations. Gastrointestinal:  Negative for abdominal pain. Endocrine: Negative for cold intolerance. Genitourinary:  Negative for difficulty urinating. Musculoskeletal:  Negative for arthralgias. Skin:  Negative for color change. Allergic/Immunologic: Negative for environmental allergies. Neurological:  Negative for dizziness. Hematological:  Bruises/bleeds easily. Psychiatric/Behavioral:  Negative for agitation. PHYSICALEXAM:    BP (!) 141/87   Pulse 62   Temp 98.7 °F (37.1 °C) (Oral)   Resp 15   Ht 5' 6\" (1.676 m)   Wt 234 lb (106.1 kg)   SpO2 96%   BMI 37.77 kg/m²     General appearance:  No apparent distress, appears stated age and cooperative. HEENT:  Normal cephalic, atraumatic withoutobvious deformity. Pupils equal, round, and reactive to light. Extra ocular muscles intact. Conjunctivae/corneas clear. Neck: Supple, with full range of motion. No jugular venous distention. Trachea midline. Respiratory:  Normal respiratory effort. Clear to auscultation, bilaterally without Rales/Wheezes/Rhonchi. Cardiovascular:  Regular rate and rhythm with normal S1/S2 without murmurs, rubs or gallops. Abdomen: Soft,non-tender, non-distended with normal bowel sounds. Musculoskeletal:  No clubbing, cyanosis or edemabilaterally.   Full range of motion without deformity. Skin: Skin color, texture, turgor normal.  No rashes orlesions. Neurologic:  Neurovascularly intact without any focal sensory/motor deficits. Cranial nerves: II-XIIintact, grossly non-focal.  Psychiatric:  Alert and oriented, thought content appropriate, normal insight  Capillary Refill: Brisk,< 3 seconds   PeripheralPulses: +2 palpable, equal bilaterally       Labs:    Recent Labs     11/08/22  0907   WBC 9.2   HGB 15.9   HCT 45.4        Recent Labs     11/08/22  0907      K 4.4      CO2 26   BUN 16   CREATININE 1.1   CALCIUM 9.9     No results for input(s): AST, ALT, BILIDIR, BILITOT, ALKPHOS in the last 72 hours. Recent Labs     11/08/22 0907   INR 0.95     No results for input(s): Josiah Bigness in the last 72 hours. Urinalysis:    No results found for: NITRU, [address], BACTERIA, RBCUA, BLOODU, SPECGRAV, GLUCOSEU    ECHO: No results found for this or any previous visit. Radiology:     Left heart cath:  I have reviewed the left heart catheterization images with the following interpretation: critical in-stent stenoses LAD and large diagonal      Code Status: Full Code      ASSESSMENT:    Active Hospital Problems    Diagnosis Date Noted    Status post angioplasty with stent [Z95.820] 11/08/2022     Priority: Medium       PLAN:  78year old male with unstable angina s/p unsuccessful PCI. Plan emergent off-pump CABGx2. The risks, benefits and alternatives were discussed in detail with the patient and family. The risks include bleeding, infection, graft failure, cardiac arrhythmias, thromboembolism, stroke, need for reoperation and death. The patient expressed understanding of these issues, confirmed that all questions were answered, and desires to proceed. Issues discussed in detail with the patient, who understands and has no further questions.  Time spent with patient: 120 minutes, of which more than 50% was spent counseling/coordinating the patient's care.    Electronically signed by Tiara Camilo MD on 11/8/2022 at 5:16 PM

## 2022-11-08 NOTE — BRIEF OP NOTE
[address] [address]  Sedation/Analgesia Post Sedation Record        Pt Name: Sunny Jaimes  MRN: [id number]  YOB: 1945  Procedure Performed By: Abbey Olivares MD MD, Tavon Ramos [address]  Primary Care Physician: Lalo Son MD    POST-PROCEDURE                                  Sedation/Anesthesia:  Local Anesthesia and IV Conscious Sedation with continuous O2 monitoring    Estimated Blood Loss: 10 cc     Specimens Removed:  [x]None []Other:      Disposition of Specimen:  []Pathology []Other        Complications:   [x]None Immediate []Other:         Procedure Performed:  Left heart cath and PCI to D1 and proximal LAD  Small piece of coronary wire had broken of after being intertwined with LAD stent   The LAD had malformed due to broken coronary wire when attempted to retrieve it  Unable to cross the LAD stent to expend it  Had used 400 cc of IV contrast and 5 mG  Terminated the procedure. Currently patient is asymptomatic  Discussed procedure details with patient and family  Will need LIMA to LAD depending on patients symptoms  Discussed about possible surgery and patient and family are willing to proceed  Discussed with another interventionalist and surgical team      Post Procedure Diagnosis/Findings:  Coronary Artery Disease, Angina III         Recommendations:   Transfer to Tele unit  Continue DAPT for now, on Plavix  CTS consultation for possible GUZMÁN to LAD  May need to proceed with CT surgery if patient is symptoms or has clinical changes  Lipid lowering therapy  Aggressive risk factor modification  Cardiac rehab  Monitor access site closely for bleeding  IV Fluids    All questions and concerns were addressed and patient is in agreement with plan.                  Abbey Olivares MD MD, Tavon Ramos, [address]  Electronically signed 11/8/2022 at 4:53 PM

## 2022-11-08 NOTE — PLAN OF CARE
Problem: Discharge Planning  Goal: Discharge to home or other facility with appropriate resources  Outcome: Progressing  Flowsheets (Taken 11/8/2022 5927)  Discharge to home or other facility with appropriate resources:   Identify barriers to discharge with patient and caregiver   Arrange for needed discharge resources and transportation as appropriate     Problem: Safety - Adult  Goal: Free from fall injury  Outcome: Progressing     Problem: Neurosensory - Adult  Goal: Achieves maximal functionality and self care  Outcome: Progressing     Problem: Respiratory - Adult  Goal: Achieves optimal ventilation and oxygenation  Outcome: Progressing     Problem: Cardiovascular - Adult  Goal: Absence of cardiac dysrhythmias or at baseline  Outcome: Progressing     Problem: Skin/Tissue Integrity - Adult  Goal: Incisions, wounds, or drain sites healing without S/S of infection  Outcome: Progressing     Problem: Gastrointestinal - Adult  Goal: Maintains adequate nutritional intake  Outcome: Progressing     Problem: Genitourinary - Adult  Goal: Absence of urinary retention  Outcome: Progressing     Problem: Infection - Adult  Goal: Absence of infection during hospitalization  Outcome: Progressing   . Suyapa Pierre Care plan reviewed with patient. Patient  verbalize understanding of the plan of care and contribute to goal setting.

## 2022-11-09 ENCOUNTER — APPOINTMENT (OUTPATIENT)
Dept: GENERAL RADIOLOGY | Age: 77
DRG: 235 | End: 2022-11-09
Attending: INTERNAL MEDICINE
Payer: MEDICARE

## 2022-11-09 PROBLEM — I25.10 CAD IN NATIVE ARTERY: Status: ACTIVE | Noted: 2022-11-09

## 2022-11-09 LAB
ACCELERATED MAX AMPLITUDE: 58.3 MM (ref 52–70)
ACTIVATED CLOTTING TIME: 167 SECONDS (ref 99–130)
ALLEN TEST: ABNORMAL
ANION GAP SERPL CALCULATED.3IONS-SCNC: 11 MEQ/L (ref 8–16)
BASE EXCESS (CALCULATED): -3 MMOL/L (ref -2.5–2.5)
BASE EXCESS (CALCULATED): -3.2 MMOL/L (ref -2.5–2.5)
BASE EXCESS (CALCULATED): -5.3 MMOL/L (ref -2.5–2.5)
BUN BLDV-MCNC: 14 MG/DL (ref 7–22)
CALCIUM IONIZED: 1.03 MMOL/L (ref 1.12–1.32)
CALCIUM SERPL-MCNC: 8.4 MG/DL (ref 8.5–10.5)
CHLORIDE BLD-SCNC: 109 MEQ/L (ref 98–111)
CITRATED KAOLIN ANGLE: 65.2 DEG (ref 63–78)
CITRATED KAOLIN K TIME: 2.3 MINUTES (ref 0.8–2.1)
CITRATED KAOLIN MAX AMPLITUDE: 58.3 MM (ref 52–69)
CITRATED KAOLIN R TIME: 8.6 MINUTES (ref 4.6–9.1)
CO2: 24 MEQ/L (ref 23–33)
COLLECTED BY:: ABNORMAL
CREAT SERPL-MCNC: 1.1 MG/DL (ref 0.4–1.2)
DEVICE: ABNORMAL
DEVICE: ABNORMAL
EKG ATRIAL RATE: 80 BPM
EKG ATRIAL RATE: 98 BPM
EKG P AXIS: 53 DEGREES
EKG P AXIS: 62 DEGREES
EKG P-R INTERVAL: 192 MS
EKG P-R INTERVAL: 218 MS
EKG Q-T INTERVAL: 352 MS
EKG Q-T INTERVAL: 414 MS
EKG QRS DURATION: 72 MS
EKG QRS DURATION: 92 MS
EKG QTC CALCULATION (BAZETT): 449 MS
EKG QTC CALCULATION (BAZETT): 477 MS
EKG R AXIS: 29 DEGREES
EKG R AXIS: 30 DEGREES
EKG T AXIS: 57 DEGREES
EKG T AXIS: 76 DEGREES
EKG VENTRICULAR RATE: 80 BPM
EKG VENTRICULAR RATE: 98 BPM
ERYTHROCYTE [DISTWIDTH] IN BLOOD BY AUTOMATED COUNT: 13.3 % (ref 11.5–14.5)
ERYTHROCYTE [DISTWIDTH] IN BLOOD BY AUTOMATED COUNT: 46.7 FL (ref 35–45)
FIBRINOGEN MAX AMPLITUDE: 19.1 MM (ref 15–32)
FUNCT FIBRINOGEN LEVEL: 348.5 MG/DL (ref 278–581)
GFR SERPL CREATININE-BSD FRML MDRD: > 60 ML/MIN/1.73M2
GLUCOSE BLD-MCNC: 119 MG/DL (ref 70–108)
GLUCOSE BLD-MCNC: 128 MG/DL (ref 70–108)
GLUCOSE BLD-MCNC: 129 MG/DL (ref 70–108)
GLUCOSE BLD-MCNC: 133 MG/DL (ref 70–108)
GLUCOSE BLD-MCNC: 140 MG/DL (ref 70–108)
GLUCOSE BLD-MCNC: 225 MG/DL (ref 70–108)
GLUCOSE, WHOLE BLOOD: 136 MG/DL (ref 70–108)
GLUCOSE, WHOLE BLOOD: 150 MG/DL (ref 70–108)
GLUCOSE, WHOLE BLOOD: 164 MG/DL (ref 70–108)
GLUCOSE, WHOLE BLOOD: 187 MG/DL (ref 70–108)
GLUCOSE, WHOLE BLOOD: 192 MG/DL (ref 70–108)
GLUCOSE, WHOLE BLOOD: 194 MG/DL (ref 70–108)
GLUCOSE, WHOLE BLOOD: 201 MG/DL (ref 70–108)
HCO3: 21 MMOL/L (ref 23–28)
HCO3: 22 MMOL/L (ref 23–28)
HCO3: 23 MMOL/L (ref 23–28)
HCT VFR BLD CALC: 18.1 % (ref 42–52)
HCT VFR BLD CALC: 20.9 % (ref 42–52)
HCT VFR BLD CALC: 22 % (ref 42–52)
HEMOGLOBIN: 6.3 GM/DL (ref 14–18)
HEMOGLOBIN: 7.1 GM/DL (ref 14–18)
HEMOGLOBIN: 7.7 GM/DL (ref 14–18)
HEPARINASE R TIME: 8.5 MINUTES (ref 4.3–8.3)
IFIO2: 6
IFIO2: 80
INR BLD: 1.26 (ref 0.85–1.13)
INR BLD: 1.29 (ref 0.85–1.13)
MAGNESIUM: 2.1 MG/DL (ref 1.6–2.4)
MCH RBC QN AUTO: 33.8 PG (ref 26–33)
MCHC RBC AUTO-ENTMCNC: 35 GM/DL (ref 32.2–35.5)
MCV RBC AUTO: 96.5 FL (ref 80–94)
MODE: ABNORMAL
O2 SATURATION: 100 %
O2 SATURATION: 100 %
O2 SATURATION: 97 %
PATIENT BOLUS: NORMAL
PCO2: 39 MMHG (ref 35–45)
PCO2: 44 MMHG (ref 35–45)
PCO2: 45 MMHG (ref 35–45)
PH BLOOD GAS: 7.28 (ref 7.35–7.45)
PH BLOOD GAS: 7.32 (ref 7.35–7.45)
PH BLOOD GAS: 7.36 (ref 7.35–7.45)
PIP: 26 CMH2O
PLATELET # BLD: 152 THOU/MM3 (ref 130–400)
PMV BLD AUTO: 10.5 FL (ref 9.4–12.4)
PO2: 260 MMHG (ref 71–104)
PO2: 290 MMHG (ref 71–104)
PO2: 99 MMHG (ref 71–104)
POTASSIUM SERPL-SCNC: 4.2 MEQ/L (ref 3.5–5.2)
POTASSIUM SERPL-SCNC: 4.5 MEQ/L (ref 3.5–5.2)
PROJECTED HEPARIN CONCENTATION: 2
RBC # BLD: 2.28 MILL/MM3 (ref 4.7–6.1)
SET PEEP: 6 MMHG
SET PEEP: 6 MMHG
SET PRESS SUPP: 12 CMH2O
SET RESPIRATORY RATE: 12 BPM
SODIUM BLD-SCNC: 144 MEQ/L (ref 135–145)
SOURCE, BLOOD GAS: ABNORMAL
WBC # BLD: 10 THOU/MM3 (ref 4.8–10.8)

## 2022-11-09 PROCEDURE — 83735 ASSAY OF MAGNESIUM: CPT

## 2022-11-09 PROCEDURE — 6370000000 HC RX 637 (ALT 250 FOR IP): Performed by: THORACIC SURGERY (CARDIOTHORACIC VASCULAR SURGERY)

## 2022-11-09 PROCEDURE — 85018 HEMOGLOBIN: CPT

## 2022-11-09 PROCEDURE — 93010 ELECTROCARDIOGRAM REPORT: CPT | Performed by: NUCLEAR MEDICINE

## 2022-11-09 PROCEDURE — 84132 ASSAY OF SERUM POTASSIUM: CPT

## 2022-11-09 PROCEDURE — 94003 VENT MGMT INPAT SUBQ DAY: CPT

## 2022-11-09 PROCEDURE — 82948 REAGENT STRIP/BLOOD GLUCOSE: CPT

## 2022-11-09 PROCEDURE — 2500000003 HC RX 250 WO HCPCS: Performed by: THORACIC SURGERY (CARDIOTHORACIC VASCULAR SURGERY)

## 2022-11-09 PROCEDURE — 82803 BLOOD GASES ANY COMBINATION: CPT

## 2022-11-09 PROCEDURE — 80048 BASIC METABOLIC PNL TOTAL CA: CPT

## 2022-11-09 PROCEDURE — 82330 ASSAY OF CALCIUM: CPT

## 2022-11-09 PROCEDURE — P9041 ALBUMIN (HUMAN),5%, 50ML: HCPCS | Performed by: THORACIC SURGERY (CARDIOTHORACIC VASCULAR SURGERY)

## 2022-11-09 PROCEDURE — 2580000003 HC RX 258: Performed by: THORACIC SURGERY (CARDIOTHORACIC VASCULAR SURGERY)

## 2022-11-09 PROCEDURE — 93005 ELECTROCARDIOGRAM TRACING: CPT | Performed by: THORACIC SURGERY (CARDIOTHORACIC VASCULAR SURGERY)

## 2022-11-09 PROCEDURE — 37799 UNLISTED PX VASCULAR SURGERY: CPT

## 2022-11-09 PROCEDURE — 6360000002 HC RX W HCPCS: Performed by: THORACIC SURGERY (CARDIOTHORACIC VASCULAR SURGERY)

## 2022-11-09 PROCEDURE — 85014 HEMATOCRIT: CPT

## 2022-11-09 PROCEDURE — 85610 PROTHROMBIN TIME: CPT

## 2022-11-09 PROCEDURE — 97530 THERAPEUTIC ACTIVITIES: CPT

## 2022-11-09 PROCEDURE — 82947 ASSAY GLUCOSE BLOOD QUANT: CPT

## 2022-11-09 PROCEDURE — 94761 N-INVAS EAR/PLS OXIMETRY MLT: CPT

## 2022-11-09 PROCEDURE — 36415 COLL VENOUS BLD VENIPUNCTURE: CPT

## 2022-11-09 PROCEDURE — 2700000000 HC OXYGEN THERAPY PER DAY

## 2022-11-09 PROCEDURE — 94660 CPAP INITIATION&MGMT: CPT

## 2022-11-09 PROCEDURE — 36430 TRANSFUSION BLD/BLD COMPNT: CPT

## 2022-11-09 PROCEDURE — 71045 X-RAY EXAM CHEST 1 VIEW: CPT

## 2022-11-09 PROCEDURE — 85027 COMPLETE CBC AUTOMATED: CPT

## 2022-11-09 PROCEDURE — 2000000000 HC ICU R&B

## 2022-11-09 PROCEDURE — 97162 PT EVAL MOD COMPLEX 30 MIN: CPT

## 2022-11-09 RX ORDER — INSULIN LISPRO 100 [IU]/ML
0-4 INJECTION, SOLUTION INTRAVENOUS; SUBCUTANEOUS NIGHTLY
Status: DISCONTINUED | OUTPATIENT
Start: 2022-11-09 | End: 2022-11-15 | Stop reason: HOSPADM

## 2022-11-09 RX ORDER — INSULIN LISPRO 100 [IU]/ML
0-4 INJECTION, SOLUTION INTRAVENOUS; SUBCUTANEOUS NIGHTLY
Status: DISCONTINUED | OUTPATIENT
Start: 2022-11-09 | End: 2022-11-09

## 2022-11-09 RX ORDER — SOTALOL HYDROCHLORIDE 80 MG/1
80 TABLET ORAL 2 TIMES DAILY
Status: DISCONTINUED | OUTPATIENT
Start: 2022-11-09 | End: 2022-11-09

## 2022-11-09 RX ORDER — SODIUM CHLORIDE 9 MG/ML
INJECTION, SOLUTION INTRAVENOUS PRN
Status: DISCONTINUED | OUTPATIENT
Start: 2022-11-09 | End: 2022-11-12

## 2022-11-09 RX ORDER — INSULIN LISPRO 100 [IU]/ML
0-8 INJECTION, SOLUTION INTRAVENOUS; SUBCUTANEOUS
Status: DISCONTINUED | OUTPATIENT
Start: 2022-11-09 | End: 2022-11-09

## 2022-11-09 RX ORDER — INSULIN LISPRO 100 [IU]/ML
0-16 INJECTION, SOLUTION INTRAVENOUS; SUBCUTANEOUS
Status: DISCONTINUED | OUTPATIENT
Start: 2022-11-09 | End: 2022-11-15 | Stop reason: HOSPADM

## 2022-11-09 RX ORDER — SOTALOL HYDROCHLORIDE 80 MG/1
40 TABLET ORAL 2 TIMES DAILY
Status: DISCONTINUED | OUTPATIENT
Start: 2022-11-09 | End: 2022-11-09

## 2022-11-09 RX ORDER — AMIODARONE HYDROCHLORIDE 200 MG/1
200 TABLET ORAL 2 TIMES DAILY
Status: DISCONTINUED | OUTPATIENT
Start: 2022-11-09 | End: 2022-11-10

## 2022-11-09 RX ADMIN — OXYCODONE 10 MG: 5 TABLET ORAL at 12:09

## 2022-11-09 RX ADMIN — VASOPRESSIN 0.04 UNITS/MIN: 20 INJECTION INTRAVENOUS at 23:20

## 2022-11-09 RX ADMIN — OXYCODONE 10 MG: 5 TABLET ORAL at 08:27

## 2022-11-09 RX ADMIN — MORPHINE SULFATE 2 MG: 2 INJECTION, SOLUTION INTRAMUSCULAR; INTRAVENOUS at 23:24

## 2022-11-09 RX ADMIN — ACETAMINOPHEN 650 MG: 325 TABLET ORAL at 15:41

## 2022-11-09 RX ADMIN — MORPHINE SULFATE 2 MG: 2 INJECTION, SOLUTION INTRAMUSCULAR; INTRAVENOUS at 10:13

## 2022-11-09 RX ADMIN — AMIODARONE HYDROCHLORIDE 1 MG/MIN: 1.8 INJECTION, SOLUTION INTRAVENOUS at 16:55

## 2022-11-09 RX ADMIN — ALBUMIN (HUMAN) 25 G: 12.5 INJECTION, SOLUTION INTRAVENOUS at 07:32

## 2022-11-09 RX ADMIN — SODIUM CHLORIDE, PRESERVATIVE FREE 10 ML: 5 INJECTION INTRAVENOUS at 08:32

## 2022-11-09 RX ADMIN — POTASSIUM CHLORIDE 20 MEQ: 29.8 INJECTION, SOLUTION INTRAVENOUS at 03:23

## 2022-11-09 RX ADMIN — POTASSIUM CHLORIDE 20 MEQ: 29.8 INJECTION, SOLUTION INTRAVENOUS at 08:45

## 2022-11-09 RX ADMIN — ACETAMINOPHEN 650 MG: 325 TABLET ORAL at 12:09

## 2022-11-09 RX ADMIN — ALBUMIN (HUMAN) 25 G: 12.5 INJECTION, SOLUTION INTRAVENOUS at 15:04

## 2022-11-09 RX ADMIN — MORPHINE SULFATE 2 MG: 2 INJECTION, SOLUTION INTRAMUSCULAR; INTRAVENOUS at 17:09

## 2022-11-09 RX ADMIN — OXYCODONE 10 MG: 5 TABLET ORAL at 15:41

## 2022-11-09 RX ADMIN — AMIODARONE HYDROCHLORIDE 150 MG: 1.5 INJECTION, SOLUTION INTRAVENOUS at 16:38

## 2022-11-09 RX ADMIN — ENOXAPARIN SODIUM 40 MG: 100 INJECTION SUBCUTANEOUS at 08:27

## 2022-11-09 RX ADMIN — CEFAZOLIN 2000 MG: 10 INJECTION, POWDER, FOR SOLUTION INTRAVENOUS at 06:25

## 2022-11-09 RX ADMIN — POTASSIUM CHLORIDE 20 MEQ: 29.8 INJECTION, SOLUTION INTRAVENOUS at 01:52

## 2022-11-09 RX ADMIN — CEFAZOLIN 2000 MG: 10 INJECTION, POWDER, FOR SOLUTION INTRAVENOUS at 15:01

## 2022-11-09 RX ADMIN — Medication 2 MCG/MIN: at 15:38

## 2022-11-09 RX ADMIN — OXYCODONE 10 MG: 5 TABLET ORAL at 20:19

## 2022-11-09 RX ADMIN — OXYCODONE 10 MG: 5 TABLET ORAL at 02:54

## 2022-11-09 RX ADMIN — MORPHINE SULFATE 2 MG: 2 INJECTION, SOLUTION INTRAMUSCULAR; INTRAVENOUS at 00:42

## 2022-11-09 RX ADMIN — CEFAZOLIN 2000 MG: 10 INJECTION, POWDER, FOR SOLUTION INTRAVENOUS at 23:23

## 2022-11-09 RX ADMIN — SODIUM CHLORIDE, PRESERVATIVE FREE 20 ML: 5 INJECTION INTRAVENOUS at 22:10

## 2022-11-09 RX ADMIN — ATORVASTATIN CALCIUM 20 MG: 20 TABLET, FILM COATED ORAL at 22:07

## 2022-11-09 RX ADMIN — MORPHINE SULFATE 2 MG: 2 INJECTION, SOLUTION INTRAMUSCULAR; INTRAVENOUS at 08:12

## 2022-11-09 RX ADMIN — MORPHINE SULFATE 2 MG: 2 INJECTION, SOLUTION INTRAMUSCULAR; INTRAVENOUS at 02:25

## 2022-11-09 RX ADMIN — AMIODARONE HYDROCHLORIDE 200 MG: 200 TABLET ORAL at 23:22

## 2022-11-09 RX ADMIN — Medication 1 TABLET: at 08:27

## 2022-11-09 RX ADMIN — CALCIUM CHLORIDE 1000 MG: 100 INJECTION, SOLUTION INTRAVENOUS at 21:51

## 2022-11-09 RX ADMIN — ALBUMIN (HUMAN) 25 G: 12.5 INJECTION, SOLUTION INTRAVENOUS at 02:17

## 2022-11-09 RX ADMIN — AMIODARONE HYDROCHLORIDE 0.5 MG/MIN: 1.8 INJECTION, SOLUTION INTRAVENOUS at 22:09

## 2022-11-09 RX ADMIN — MORPHINE SULFATE 2 MG: 2 INJECTION, SOLUTION INTRAMUSCULAR; INTRAVENOUS at 00:02

## 2022-11-09 RX ADMIN — ASPIRIN 325 MG: 325 TABLET, COATED ORAL at 08:27

## 2022-11-09 RX ADMIN — FAMOTIDINE 20 MG: 10 INJECTION INTRAVENOUS at 08:28

## 2022-11-09 RX ADMIN — SENNOSIDES AND DOCUSATE SODIUM 1 TABLET: 50; 8.6 TABLET ORAL at 22:07

## 2022-11-09 RX ADMIN — CEFAZOLIN 2000 MG: 10 INJECTION, POWDER, FOR SOLUTION INTRAVENOUS at 02:00

## 2022-11-09 RX ADMIN — CALCIUM CHLORIDE 1000 MG: 100 INJECTION, SOLUTION INTRAVENOUS at 03:43

## 2022-11-09 RX ADMIN — SENNOSIDES AND DOCUSATE SODIUM 1 TABLET: 50; 8.6 TABLET ORAL at 08:27

## 2022-11-09 RX ADMIN — TAMSULOSIN HYDROCHLORIDE 0.4 MG: 0.4 CAPSULE ORAL at 08:27

## 2022-11-09 ASSESSMENT — PAIN SCALES - GENERAL
PAINLEVEL_OUTOF10: 3
PAINLEVEL_OUTOF10: 6
PAINLEVEL_OUTOF10: 3
PAINLEVEL_OUTOF10: 4
PAINLEVEL_OUTOF10: 10
PAINLEVEL_OUTOF10: 8
PAINLEVEL_OUTOF10: 3
PAINLEVEL_OUTOF10: 8
PAINLEVEL_OUTOF10: 6

## 2022-11-09 ASSESSMENT — PULMONARY FUNCTION TESTS
PIF_VALUE: 17
PIF_VALUE: 25

## 2022-11-09 ASSESSMENT — PAIN DESCRIPTION - LOCATION
LOCATION: CHEST

## 2022-11-09 ASSESSMENT — PAIN DESCRIPTION - PAIN TYPE
TYPE: SURGICAL PAIN
TYPE: SURGICAL PAIN

## 2022-11-09 ASSESSMENT — PAIN DESCRIPTION - ORIENTATION: ORIENTATION: MID

## 2022-11-09 ASSESSMENT — PAIN DESCRIPTION - DESCRIPTORS: DESCRIPTORS: DULL;DISCOMFORT

## 2022-11-09 NOTE — ANESTHESIA PROCEDURE NOTES
Procedure Performed: RAYA       Start Time:  11/8/2022 6:45 PM       End Time:   11/8/2022 10:17 PM    Preanesthesia Checklist:  Patient identified, IV assessed, risks and benefits discussed, monitors and equipment assessed, procedure being performed at surgeon's request and anesthesia consent obtained. General Procedure Information  Diagnostic Indications for Echo:  hemodynamic monitoring and assessment of valve function  Physician Requesting Echo: Kei Evans MD  CPT Code:  75575,00199,78590  Location performed:  OR  Intubated  Bite block placed  Heart visualized  Probe Insertion:  Easy  Probe Type:  3D  Modalities:  2D only, color flow mapping, continuous wave Doppler, pulse wave Doppler, M-mode and 3D        Anesthesia Information  Performed with CRNAs  Anesthesiologist:  Melinda Sargent MD      Echocardiogram Comments:       INTRA OP RAYA  INSERTED WELL LUBRICATED 3 D RAYA PROBE.  EASY ATRAUMATIC INSERTION. COMPREHENSIVE STUDY ACQUIRED. AORTIC VALVE TRICUSPID , AV AREA 3.2 CM SQ  NO AV STENOSIS, NO AI.  NO ASCENDING AORTIC ANEURYSM. MITRAL VALVE STRUCTURALLY NORMAL   NO MV STENOSIS, TRACE MR  PULMONARY VALVE COMPETENT. TRICUSPID VALVE TRACE TR  LV FUNCTION NO RWMA, EF 58 %  STROKE VOLUME 80 ML, CO= 80 X 60 = 4.8 L/MIN  DIASTOLIC DYSFUNCTION IMPAIRED RELAXATION E/A = 0.6  FINDINGS DISCUSSED WITH DR Nabila Rodrigues  ASSISTED FLUID ADMINISTRATION AND INOTROPIC SUPPORT DURING   OFF PUMP CABG. AT THE CONCLUSION OF CABG   LV FUNCTION NO RWMA, EF 58%  STROKE VOLUME  90 ML , CO = 90 X 60 = 5.4 L/MIN  MAGNITUDE OF MR AND TR UNCHANGED. NO PERICARDIAL FLUID COLLECTION  TOWARDS CONCLUSION OF SURGERY.

## 2022-11-09 NOTE — OP NOTE
DATE: 11/08/22    PATIENT: Enrico Britt    MRN: [id number]     Acct: [de-identified]    PREOP DIAGNOSIS:   Coronary artery disease    Postop diagnosis:  Coronary artery disease    Procedure:  1)  Emergency off-pump coronary artery bypass grafting x 2, with the left internal mammary artery grafted to the left anterior descending coronary artery, and a reversed greater saphenous aortocoronary vein graft to the first diagonal coronary artery    2) Endoscopic greater saphenous vein harvest    SURGEON:  Ernestina Bran MD    FIRST ASSISTANT OF MEDICAL NECESSITY: DIO Conrad    DUE TO THE COMPLEXITY OF THE CASE, THE FIRST ASSISTANT WAS REQUIRED THROUGHOUT THE OPERATION EXPOSURE OF THE ANATOMY    ESTIMATED BLOOD LOSS: Minimal (cell saver)    INDICATION:  The patient is a 78y.o. year-old male, on chronic clopidogrel, who presented for redo PCI of the LAD and diagonal coronaries. The PCI to the LAD was unsuccessful, and the patient experienced unstable angina. He was taken emergently for 2-vessel coronary revascularization. FINDNGS: There were no intrapericardial adhesions. The mammary artery was of good caliber. The LAD was intramyocardial in the proximal third of the vessel. Epicardial ecchymosis was present in the proximal portion of the vessel, but the LAD stent column was not palpable. The stent column in the diagonal was palpable, and was crushed with a forceps to eliminate competitive flow. CARDIOPULMONARY BYPASS TIME: 0    DESCRIPTION:  The patient was prepped and draped in sterile fashion in the supine position. A formal time-out was performed. Heparin was administered. Working in two teams, a segment of greater saphenous vein was harvested from the right leg using endoscopic technique, while the left internal mammary artery was mobilized from the chest wall and divided at its distal bifurcation. A retractor was placed, and a pericardial well was created.       A sling was created with umbilical tapes anchored to the posterior pericardium midway between the IVC and the left inferior pulmonary vein. A slit was made in the pericardium in the usual fashion to accommodate entry of the LIMA into the pericardial cavity. The apex was elevated, and the LAD was palpated. As noted, ecchymosis was seen in the epicardium in the proximal portion of the vessel. However, due to its intramyocardial course proximally, the stent column was not palpable. A tissue stabilizer was applied. A preliminary dissection was performed. Retraction sutures were placed. A traction tape was passed around the LAD proximally. An arteriotomy was made. A 1.75 mm coronary shunt was  inserted, and the traction tape was released to restore flow through the shunt. The appropriately trimmed left internal mammary artery was anastomosed in end to side fashion to the mid proximal left anterior descending coronary artery using running 7-0 Prolene. Pulsatility of the vessel was excellent. The anterolateral wall was exposed. A tissue stabilizer was applied to expose the first diagonal artery. A traction tape was passed around the artery for proximal control. An arteriotomy was made, and the anastomosis of the segment of reversed greater saphenous to the first diagonal artery was performed with running 7-0 prolene. The traction tape was released. The proximal end of the vein graft was trimmed appropriately, with a significant amount of backbleeding noted. The stent column in the diagonal artery was crushed with a forceps, with a significant reduction in the backbleeding. The adventitia was trimmed from the mid ascending aorta. A partial occlusion clamp was applied. A 4 mm punch aortotomy was made. The proximal end of the vein graft was sewn to the aortotomy using running 6-0 prolene. The vein graft was de-aired after the partial occlusion clamp was released. Protamine was administered.    RAYA showed excellent contractility throughout. All sites were verified as hemostatic. Chest tubes were placed in the mediastinum and the pleural cavities. The chest was closed using #7 wire for the sternum, followed by the usual 3-layer soft tissue closure. The patient transferred to the ICU in stable condition.

## 2022-11-09 NOTE — PLAN OF CARE
Patient extubated to 5L Nasal cannula. No complications will continue to monitor. Patient agrees to care plan.

## 2022-11-09 NOTE — ANESTHESIA PROCEDURE NOTES
Arterial Line:    An arterial line was placed using surface landmarks, in the OR for the following indication(s): continuous blood pressure monitoring and blood sampling needed. A 20 gauge (size), 1 and 1/4 inch (length), Arrow (type) catheter was placed, Seldinger technique used, into the left radial artery, secured by suture, tape and Tegaderm. Anesthesia type: General    Events:  patient tolerated procedure well with no complications and EBL < 5mL. 11/8/2022 6:20 PM11/8/2022 6:25 PM  Anesthesiologist: Alyssa Johnston MD  Preanesthetic Checklist  Completed: patient identified, IV checked, site marked, risks and benefits discussed, surgical/procedural consents, equipment checked, pre-op evaluation, timeout performed, anesthesia consent given, oxygen available, monitors applied/VS acknowledged, fire risk safety assessment completed and verbalized and blood product R/B/A discussed and consented

## 2022-11-09 NOTE — PROGRESS NOTES
11/09/22 1335   Encounter Summary   Encounter Overview/Reason  Spiritual/Emotional Needs   Service Provided For: Patient and family together   Referral/Consult From: Max   Last Encounter  11/09/22   Complexity of Encounter Moderate   Begin Time 1025   End Time  1035   Total Time Calculated 10 min   Encounter    Type Follow up   Spiritual/Emotional needs   Type Spiritual Support   Assessment/Intervention/Outcome   Assessment Coping   Intervention Sustaining Presence/Ministry of presence;Prayer (assurance of)/[place]   Assessment: In my encounter with the 78 yr old patient in ICU (they were non-responsive) so I had conversation with the patient's family. I also came to assess the present spiritual needs of the family. The pt was admitted due to status post angioplasty with stent. Interventions:  I provided, prayer, emotional support and words of comfort. Outcomes: The patient's family was grateful and didn't share any further spiritual needs at this time. The pt's family shared that they were appreciative for the support. Plan:  Chaplains will follow-up at a later time for assessment of any spiritual care needs present.   The Chaplains will be available to provide further emotional support per request.

## 2022-11-09 NOTE — PROGRESS NOTES
[address] THERAPY MISSED TREATMENT NOTE  [facility] ICU 4D  4D-011-A      Date: 2022  Patient Name: Angela Fregoso        CSN: [id number]   : 1945  (78 y.o.)  Gender: male   Referring Practitioner: Dr. Álvaro Malcolm TREATMENT:  Per nurse, Pt still has femoral sheath in & is having lots of pain this am. Request try back in pm.

## 2022-11-09 NOTE — CARE COORDINATION
Case Management Assessment  Initial Evaluation    Date/Time of Evaluation: 11/9/2022 12:26 PM  Assessment Completed by: Elvis Arenas RN    If patient is discharged prior to next notation, then this note serves as note for discharge by case management. Patient Name: Romeo Alberts                   YOB: 1945  Diagnosis: Abnormal findings on cardiac catheterization [R93.1]  Status post angioplasty with stent [Z95.820]  CAD in native artery [I25.10]                   Date / Time: 11/8/2022  8:39 AM    Patient Admission Status: Inpatient     Current PCP: Sury Loya MD  PCP verified by CM? Yes    Chart Reviewed: Yes      Patient Orientation: Alert and Oriented    Patient Cognition: Alert  History Provided by: Patient, Spouse    Hospitalization in the last 30 days (Readmission):  No    If yes, Readmission Assessment in  Navigator will be completed. Advance Directives:     Code Status: Full Code     Primary Decision Maker: Blanca Stearns - Domestic Partner - [phone]    Discharge Planning  Patient lives with: Spouse/Significant Other Type of Home: House   Primary Caregiver: Self  Patient Support Systems include: Spouse/Significant Other   Current Financial resources: Medicare  Current community resources: Other (Comment) (none)  Current services prior to admission: Durable Medical Equipment   Type of Home Care services:  None    ADLS  Prior functional level: Independent in ADLs/IADLs  Current functional level: Other (see comment) (ASHISH; will monitor therapy evals since surgery/sternal precautions)      Family can provide assistance at DC: Yes  Would you like Case Management to discuss the discharge plan with any other family members/significant others, and if so, who?  No  Plans to Return to Present Housing: Yes  Other Identified Issues/Barriers to RETURNING to current housing: none  Potential Assistance needed at discharge: [address]  Patient expects to discharge to: [address] [place] [address] for transportation at discharge: Family    Financial  Payor: Herbert Linn / Plan: [address] HMO / Product Type: *No Product type* /     Does insurance require precert for SNF: Yes    Potential assistance Purchasing Medications: No  Meds-to-Beds request: Yes      [address] [facility], [place] [address]  [address] [facility]  Phone: [phone] Fax: [phone] [address] [facility], [address] [phone] - F [phone]  [facility] [address] [facility] [address]  Phone: [phone] Fax: [phone]    [facility] [id number] - [address] [place] [address] [place] [phone] Pamla Erps [phone]  [address]  [address] [address]  Phone: [phone] Fax: [phone]    [address] [facility], [address] [facility]. [address] [address] [facility]. [address] [facility] [address] [facility] [address]  Phone: [phone] Fax: [phone]      Factors facilitating achievement of predicted outcomes: Family support, Cooperative, and Pleasant    Barriers to discharge: Medical complications and Medication managment    Additional Case Management Notes: Presented to  for elective Cardiac Cath. During cath, small piece of coronary wire broke off after being intertwined with LAD stent, unable to retrieve wire. Procedure terminated. [facility] consulted. Patient taken for emergent 2v CABG with Dr. Vishnu Puri. ICU post-op on vent. POD #1. Extubated early this morning. Sats 96% on 4L O2. Afebrile. NSR-ST. Ox4. CT x4 to -20sx with 908 ml out since surgery. CR/PT/OT. Dietitian consulted. Dietary ileus prevention protocol. Telemetry, I&O, daily weight, IS, sternal precautions, CT care, wound care, SCDs, can care, ambulate.  Epi @ 1 mcg/min, asa, lipitor, IV ancef, pepcid, SSI, prn IV morphine, prn IV zofran, prn roxicodone, flomax, sotalol, electrolyte replacement protocols. Hgb 7.7, INR 1.26. Procedure:   11/8 Cardiac Cath: Left heart cath and PCI to D1 and proximal LAD. Small piece of coronary wire had broken of after being intertwined with LAD stent/ The LAD had malformed due to broken coronary wire when attempted to retrieve it. Unable to cross the LAD stent to expend it. Terminated the procedure  11/8 Emergent 2v CABG  11/8 Intubated - 11/9 Extubated  11/9 CXR:   1. Tubing coil at the neck, with tip at the T2/T3 level, most likely    malpositioned orogastric tube. 2. Stable left basilar airspace opacity/atelectasis       The Plan for Transition of Care is related to the following treatment goals of Abnormal findings on cardiac catheterization [R93.1]  Status post angioplasty with stent [Z95.820]  CAD in native artery [I25.10]    Patient Goals/Plan/Treatment Preferences: Spoke with Cedrick Chavez and his wife, Stephanie Power. He uses a cane when in the home and a rollator when out of the house. He has a cpap, but it was recalled and haven't received a new one yet, so has been about a year without his cpap. Their toilets at home are standard height; however, Stephanie Power states they have a riser for toilet if needed. He has a shower with a stool they can put in it. SW on case. Plan home with wife and new HH. Has DME. Transportation/Food Security/Housekeeping Addressed: No issues identified.      Mckenzie Diaz RN  Case Management Department

## 2022-11-09 NOTE — DISCHARGE INSTRUCTIONS
Follow with Dr. Dorota Gonzales 2 months - sp CABG       Discharge Instructions Following Cardiac Surgery [facility]  [facility]  Pt Name: [address] Record Number: [id number]  Today's Date: 11/15/2022    ACTIVITY/EXERCISE   ? It will take 2 to 3 months to feel like you did before surgery in terms of energy and strength. ?    Slowly increase your activity after you go home. Pace yourself, listen to your body. If it hurts, stop. During the first 2 months, no digging, outside bike riding, or using arm movement on  a stationary bike for sternal precautions. ?   The limit on how much you can lift, push or pull is 10 pounds. For the first 4 weeks after surgery, you must not lift anything over 10 pounds. (You cannot lift anything heavier than a gallon of  milk). Beginning the 5th week after surgery, you may lift, push or pull up to 20 pounds. ? Begin your walking program on the first day you are home and record how many times per day and number of minutes on your log. You may climb stairs; there are no limits to stair climbing. ? Continue to do your cough and deep breathing exercises and the incentive spirometer 6 times a day as you did in the hospital.   ?    Do not use arms to get up from a seated position. Instead, rock in your chair to give yourself momentum to sit up.   ?    You may begin light house hold chores, washing a few dishes, dusting, setting the table or folding laundry. ?    You can have sex when it is comfortable for you. The amount of stress on the heart during sex is about the same as climbing 2 flights of stairs. APPETITE   ? Your appetite might be decreased at first.  It should slowly improve. ? Small, frequent meals, as well as cold foods, may help. ?   The dietitian will assist you with a low fat, low cholesterol, low salt diet. ? Consult your open heart binder for diet instructions. TEMPERATURE   ?    Take your temperature at the same time each day. Take your temperature at 8 a.m. (morning) and 8 p.m. (evening). WEIGHT   ? Weigh yourself when you awaken in the morning and record in your daily log. PAIN   ? You may have pain at the incision. Shoulder, neck, back and upper chest discomfort are common. Take your pain medications as ordered. ?   You may use a heating pad on your neck and shoulders if you have soreness. Never place it on your incision. BOWEL HABITS   ? Constipation is common due to Pain medications and inactivity. ?   Try drinking prune juice, eating a well balanced diet including fruits, vegetables and whole grains. ?   If constipation persists, you may use any over-the-counter laxative, suppository or enema. DRIVING AND RIDING IN A CAR INSTRUCTIONS  ? You may ride in a car, NO DRIVING until seen by your surgeon. ? Your surgeon will tell you when you can resume driving at your postoperative office visit, normally 4 weeks after you are discharged, so he can check your sternal incision. ? No DRIVING while on Prescription pain medication! ? You should always wear the seat belt. It is OK to sit in the front passenger seat. If you are in an accident that causes the air bag to go off. ..it is better to have the seat belt on and the air bag to protect yourself. INCISIONS  ? Warning signs of infection: redness, warmth to touch, green colored pus, tender to touch. Notify surgeon's office right away if any warnings occur. ?   Ok to shower daily, no tub baths for the first month following procedure. ?   Be sure to rinse the incision with water and pat dry with clean towels. ?   Wash your hands before beginning to clean your incisions. ?   Wash each of your incisions with Exidine soap and water 2 times a day using a clean wash cloth and towel for each incision each time. Carefully pat incision dry with a towel.    ?   Female patients, wear a bra 24 hours/day for 2 weeks.  Change your bra daily. You may place a gauze between the breasts to reduce moisture. ?   Sutures may be removed by any health-care professional after 7 days from ANNA MARIE/chest tube removal.    SMOKING   If you smoke, STOP. Smoking will cause early graft closure, other blockages, new heart attacks, and possibly even death. SLEEPING   ? If you have trouble sleeping during the night, take your pain medication at bedtime. SUPPORT STOCKINGS   ? Wear at home for 1 month and when the swelling in your legs go away. Take them off at night when you go to bed. ?   A family member needs to put them on you, it takes more than 10 pounds of pressure to put them on.   ? Hand or machine wash. Line dry. SWELLING OF LEGS   ? It is common to have leg swelling, especially if you have a leg incision. ? It is helpful to keep your legs elevated when you are sitting or lie down and elevate your legs on pillows. AWARENESS OF HEART BEATING   ? You may feel your heart is beating stronger or that your heart is beating in your neck and ears. DON'T WORRY - this is common especially at bedtime. VIDEO   ? This is a video link to watch about discharge. Type into computer and you will be able to watch them. [email or web address]            CALL YOUR SURGEON IF YOU HAVE:   ? Rapid heart rate or fluttering in your chest   ? Fever over 101° F.   ? Weight gain or loss of 3 pounds overnight or 5      pounds in one week   ? Persistent nausea or vomiting        ? ANY NEW STERNAL DRAINAGE   ? Excessive LEG drainage or very red incision   ? Increasing shortness of breath   ? Pain unrelieved by prescribed medication    OFFICE VISITS   ? BRING YOUR MEDICATION LIST and medications even over the counter medications to all your follow up appointments.    ?    You should have a follow up appointment in the office in about 1 month after discharge from the hospital.   Office Location:   [address] [facility].[address]   ? You may call the office to make an appointment, if you don't have an appointment. ([phone]). ? You will need to have a chest xray and labs completed 3-7 days before your follow up appointment. ?    Bring any questions you have so they may be addressed. ? You should have a follow up appointment with your primary care doctor 7-10 days from discharge, please call and make one if you do not have one.   ?   You should have a  follow up appointment with your Cardiologist 5-6 weeks from discharge, please call and make one if you do not have one.   ?   Cardiac Rehab will set up a follow up time for you to begin your rehabilitation program.         EMERGENCIES   ? You should either call 911 or go to the Emergency Room to be evaluated if you need seen immediately. ?  Sudden, severe shortness of breath go to the Emergency Room. If you have questions regarding these instructions, please call our office  [phone]. We have an answering service 24 hours a day to get your calls to the ON Call Physician, but we are often in surgery and it takes us awhile to get back to you.              Do not remove sutures from chest tube until after 11/18/2022********

## 2022-11-09 NOTE — CARE COORDINATION
DISCHARGE/PLANNING EVALUATION  11/9/22, 10:39 AM EST    Reason for Referral: Discharge planning post OHS. Mental Status: Pt alert and oriented. Decision Making: makes own decisions. Family/Social/Home Environment: Assessment completed with pt and wife, state they reside together [place] one [place]. Wife states pt has neuropathy therefore, she does 90 % of the household chores and he shares the remainign 10%. Wife states pt uses a cane in the house and a rollator when they leave the house. Wife states they leave [place] 3 months out of the year and try to stay active. Wife states she does the driving due to pts nw=er   Current Services including food security, transportation and housekeeping: see note above. Current Equipment: Cane and rollator. Payment Source: [employer] Advantage. Concerns or Barriers to Discharge: none reported. Post-acute [facility]) provider list was provided to patient. Patient was informed of their freedom to choose [facility] provider. Discussed and offered to show the patient the relevant [facility] Providers quality and resource use measures on Medicare Compare web site via computer based on patient's goals of care and treatment preferences. Questions regarding selection process were answered. Teach Back Method used with pt regarding care plan and discharge planning. Patient and wife verbalize understanding of the plan of care and contribute to goal setting. Patient goals, treatment preferences and discharge plan: Wife planning home with here at discharge, agreeable to [place], list provided. SW reviewed other possible options, SNF vs IPR, wife not interested at this time.     Electronically signed by MANUEL King on 11/9/2022 at 10:39 AM

## 2022-11-09 NOTE — ANESTHESIA PROCEDURE NOTES
Central Venous Line:    A central venous line was placed using ultrasound guidance, in the OR for the following indication(s): central venous access and CVP monitoring. 11/8/2022 6:33 PM11/8/2022 6:43 PM    Sterility preparation included the following: hand hygiene performed prior to procedure, maximum sterile barriers used and sterile technique used to drape from head to toe. The patient was placed in Trendelenburg position. The right internal jugular vein was prepped. The site was prepped with Chloraprep. A 9 Fr (size), 10 (length), introducer single lumen was placed. During the procedure, the following specific steps were taken: target vein identified, needle advanced into vein and blood aspirated and guidewire advanced into vein. Intravenous verification was obtained by venous blood return. Post insertion care included: all ports aspirated, all ports flushed easily, guidewire removed intact, Biopatch applied, line sutured in place and dressing applied. During the procedure the patient experienced: patient tolerated procedure well with no complications and EBL < 5mL. Insertion site scrubbed per usage guidelines?: Yes  Skin prep agent dried for 3 minutes prior to procedure?:yes  Outcomes: uncomplicated and patient tolerated procedure wellno  Anesthesia type: generalA(n) non-oximetric, 7.5 (size) Pulmonary Artery Catheter (PAC) was placed through the Introducer CVL in the right internal jugular vein. The PAC placement was confirmed by pressure tracing changes and secured at 48 cm (depth). The patient experienced the following events during the procedure: patient tolerated procedure well with no complications. PA Cath placed?: Yes  Staffing  Anesthesiologist: Fatimah Solis MD  Preanesthetic Checklist  Completed: patient identified, IV checked, site marked, risks and benefits discussed, surgical/procedural consents, equipment checked, pre-op evaluation, timeout performed, anesthesia consent given, oxygen available, monitors applied/VS acknowledged, fire risk safety assessment completed and verbalized and blood product R/B/A discussed and consented

## 2022-11-09 NOTE — PROGRESS NOTES
Cardiovascular Surgery Progress Note      Comfortable on NC  Good hemodynamics, CI 3+ on epi 2, low filling pressures  EKG mild pan ST elevation, pericarditis  CXR no space issues  Labs noted  -Albumin bolus   -Wean epi  -Possible transfer floor this afternoon

## 2022-11-09 NOTE — PROGRESS NOTES
0100 Bedside shift report received from [facility]. Pt family member at bedside. 0205 RT and this RN at bedside. Patient extubated to 4L nasal cannula. 0557 Morning EKG reading STEMI, Dr Batsheva Manzanares notified. No new orders. Likely do to pericarditis.

## 2022-11-09 NOTE — PLAN OF CARE
Problem: Respiratory - Adult  Goal: Achieves optimal ventilation and oxygenation  11/9/2022 0345 by Elpidio Del Castillo RCP  Outcome: Progressing   Patient placed on bipap 16/6 40%. Tolerating well. Patient agrees to care plan.   Will continue to monitor

## 2022-11-09 NOTE — PROGRESS NOTES
1  Static Standing Balance: Minimal Assistance, X 2  Dynamic Standing Balance: Minimal Assistance, X 2    Bed Mobility:  Supine to Sit: Moderate Assistance, X 2, with head of bed raised, without rail, with verbal cues , with increased time for completion  Scooting: Maximum Assistance, X 1    Transfers:  Sit to Stand: Minimal Assistance, X 2, with verbal cues for use of heart hugger  Stand to Sit:Minimal Assistance, X 2, with verbal cues when to sit down    Ambulation:  Minimal Assistance, X 2  Distance: 2 feet x 2  Surface: Level Tile  Device:No Device  Gait Deviations: Forward Flexed Posture, Slow Lin, Decreased Step Length Bilaterally, Decreased Gait Speed, and Wide Base of Support    Functional Outcome Measures: Completed  AM-PAC Inpatient Mobility without Stair Climbing Raw Score : 6  AM-PAC Inpatient without Stair Climbing T-Scale Score : 26.48    ASSESSMENT:  Activity Tolerance:  Patient tolerance of  treatment: good. Treatment Initiated: Treatment and education initiated within context of evaluation. Evaluation time included review of current medical information, gathering information related to past medical, social and functional history, completion of standardized testing, formal and informal observation of tasks, assessment of data and development of plan of care and goals. Treatment time included skilled education and facilitation of tasks to increase safety and independence with functional mobility for improved independence and quality of life. Assessment: Body Structures, Functions, Activity Limitations Requiring Skilled Therapeutic Intervention: Decreased functional mobility , Decreased endurance, Increased pain, Decreased balance, Decreased strength  Assessment: Shiloh Ge is a 78 y.o. male that presents s/p CABG. he is ind prior to admission now requiring assist for basic mobility.  Pt demonstrates a decrease in baseline by way of bed mobility, transfers and ambulation secondary to decreased activity tolerance, strength, fatigue, and balance deficits. Pt will benefit from skilled PT services throughout admission and beyond hospital discharge for improvements in functional mobility and in order to decrease fall risk and return pt to PLOF. Therapy Prognosis: Excellent    Requires PT Follow-Up: Yes    Discharge Recommendations:  Discharge Recommendations: Continue to assess pending progress    Patient Education:      . Patient Education  Education Given To: Patient, Family  Education Provided: Role of Therapy, Plan of Care  Education Method: Verbal  Barriers to Learning: None  Education Outcome: Verbalized understanding       Equipment Recommendations:  Equipment Needed: No    Plan:  Current Treatment Recommendations: Strengthening, Balance training, Functional mobility training, Transfer training, Endurance training, Neuromuscular re-education, Gait training, Stair training, Patient/Caregiver education & training, Therapeutic activities, Home exercise program, Safety education & training  General Plan:  (6x CABG)    Goals:  Patient Goals : to go home  Short Term Goals  Time Frame for Short Term Goals: by discharge  Short Term Goal 1: Pt to transfer supine <--> sit SBA to enable pt to get in/out of bed. Short Term Goal 2: Pt to transfer sit <--> stand SBA for increased functional mobility. Short Term Goal 3: Pt to ambulate >200 feet with LRAD SBA for household ambulation. Short Term Goal 4: Pt to ascend/descend 1 step with AD CGA for home entry. Long Term Goals  Time Frame for Long Term Goals : NA due to short length of stay. Following session, patient left in safe position with all fall risk precautions in place.

## 2022-11-10 ENCOUNTER — APPOINTMENT (OUTPATIENT)
Dept: GENERAL RADIOLOGY | Age: 77
DRG: 235 | End: 2022-11-10
Attending: INTERNAL MEDICINE
Payer: MEDICARE

## 2022-11-10 LAB
ANION GAP SERPL CALCULATED.3IONS-SCNC: 13 MEQ/L (ref 8–16)
BUN BLDV-MCNC: 20 MG/DL (ref 7–22)
CALCIUM SERPL-MCNC: 8.7 MG/DL (ref 8.5–10.5)
CHLORIDE BLD-SCNC: 104 MEQ/L (ref 98–111)
CO2: 22 MEQ/L (ref 23–33)
CREAT SERPL-MCNC: 1.3 MG/DL (ref 0.4–1.2)
EKG Q-T INTERVAL: 314 MS
EKG QRS DURATION: 70 MS
EKG QTC CALCULATION (BAZETT): 436 MS
EKG R AXIS: 21 DEGREES
EKG T AXIS: 58 DEGREES
EKG VENTRICULAR RATE: 116 BPM
ERYTHROCYTE [DISTWIDTH] IN BLOOD BY AUTOMATED COUNT: 14.1 % (ref 11.5–14.5)
ERYTHROCYTE [DISTWIDTH] IN BLOOD BY AUTOMATED COUNT: 49.6 FL (ref 35–45)
GFR SERPL CREATININE-BSD FRML MDRD: 57 ML/MIN/1.73M2
GLUCOSE BLD-MCNC: 141 MG/DL (ref 70–108)
GLUCOSE BLD-MCNC: 173 MG/DL (ref 70–108)
GLUCOSE BLD-MCNC: 178 MG/DL (ref 70–108)
GLUCOSE BLD-MCNC: 188 MG/DL (ref 70–108)
HCT VFR BLD CALC: 20.8 % (ref 42–52)
HCT VFR BLD CALC: 22.9 % (ref 42–52)
HEMOGLOBIN: 7.1 GM/DL (ref 14–18)
HEMOGLOBIN: 8 GM/DL (ref 14–18)
MAGNESIUM: 1.9 MG/DL (ref 1.6–2.4)
MCH RBC QN AUTO: 33.2 PG (ref 26–33)
MCHC RBC AUTO-ENTMCNC: 34.1 GM/DL (ref 32.2–35.5)
MCV RBC AUTO: 97.2 FL (ref 80–94)
PLATELET # BLD: 131 THOU/MM3 (ref 130–400)
PMV BLD AUTO: 10.7 FL (ref 9.4–12.4)
POTASSIUM SERPL-SCNC: 4 MEQ/L (ref 3.5–5.2)
RBC # BLD: 2.14 MILL/MM3 (ref 4.7–6.1)
SODIUM BLD-SCNC: 139 MEQ/L (ref 135–145)
WBC # BLD: 13.9 THOU/MM3 (ref 4.8–10.8)

## 2022-11-10 PROCEDURE — 2500000003 HC RX 250 WO HCPCS: Performed by: PHYSICIAN ASSISTANT

## 2022-11-10 PROCEDURE — 93010 ELECTROCARDIOGRAM REPORT: CPT | Performed by: NUCLEAR MEDICINE

## 2022-11-10 PROCEDURE — 36430 TRANSFUSION BLD/BLD COMPNT: CPT

## 2022-11-10 PROCEDURE — 71045 X-RAY EXAM CHEST 1 VIEW: CPT

## 2022-11-10 PROCEDURE — 97110 THERAPEUTIC EXERCISES: CPT

## 2022-11-10 PROCEDURE — APPSS30 APP SPLIT SHARED TIME 16-30 MINUTES: Performed by: PHYSICIAN ASSISTANT

## 2022-11-10 PROCEDURE — 6370000000 HC RX 637 (ALT 250 FOR IP): Performed by: THORACIC SURGERY (CARDIOTHORACIC VASCULAR SURGERY)

## 2022-11-10 PROCEDURE — 97166 OT EVAL MOD COMPLEX 45 MIN: CPT

## 2022-11-10 PROCEDURE — 6360000002 HC RX W HCPCS: Performed by: THORACIC SURGERY (CARDIOTHORACIC VASCULAR SURGERY)

## 2022-11-10 PROCEDURE — 36415 COLL VENOUS BLD VENIPUNCTURE: CPT

## 2022-11-10 PROCEDURE — 2000000000 HC ICU R&B

## 2022-11-10 PROCEDURE — 80048 BASIC METABOLIC PNL TOTAL CA: CPT

## 2022-11-10 PROCEDURE — 82948 REAGENT STRIP/BLOOD GLUCOSE: CPT

## 2022-11-10 PROCEDURE — 83735 ASSAY OF MAGNESIUM: CPT

## 2022-11-10 PROCEDURE — 85027 COMPLETE CBC AUTOMATED: CPT

## 2022-11-10 PROCEDURE — 2500000003 HC RX 250 WO HCPCS

## 2022-11-10 PROCEDURE — 2500000003 HC RX 250 WO HCPCS: Performed by: THORACIC SURGERY (CARDIOTHORACIC VASCULAR SURGERY)

## 2022-11-10 PROCEDURE — 93005 ELECTROCARDIOGRAM TRACING: CPT | Performed by: THORACIC SURGERY (CARDIOTHORACIC VASCULAR SURGERY)

## 2022-11-10 PROCEDURE — 2580000003 HC RX 258: Performed by: THORACIC SURGERY (CARDIOTHORACIC VASCULAR SURGERY)

## 2022-11-10 PROCEDURE — 85014 HEMATOCRIT: CPT

## 2022-11-10 PROCEDURE — 85018 HEMOGLOBIN: CPT

## 2022-11-10 RX ORDER — FUROSEMIDE 10 MG/ML
20 INJECTION INTRAMUSCULAR; INTRAVENOUS 2 TIMES DAILY
Status: DISCONTINUED | OUTPATIENT
Start: 2022-11-10 | End: 2022-11-15 | Stop reason: HOSPADM

## 2022-11-10 RX ORDER — POTASSIUM CHLORIDE 20 MEQ/1
20 TABLET, EXTENDED RELEASE ORAL 2 TIMES DAILY WITH MEALS
Status: DISCONTINUED | OUTPATIENT
Start: 2022-11-10 | End: 2022-11-13

## 2022-11-10 RX ORDER — SODIUM CHLORIDE 9 MG/ML
INJECTION, SOLUTION INTRAVENOUS PRN
Status: DISCONTINUED | OUTPATIENT
Start: 2022-11-10 | End: 2022-11-12

## 2022-11-10 RX ADMIN — OXYCODONE 10 MG: 5 TABLET ORAL at 08:22

## 2022-11-10 RX ADMIN — POTASSIUM CHLORIDE 20 MEQ: 1500 TABLET, EXTENDED RELEASE ORAL at 10:55

## 2022-11-10 RX ADMIN — FAMOTIDINE 20 MG: 20 TABLET ORAL at 20:47

## 2022-11-10 RX ADMIN — FUROSEMIDE 20 MG: 10 INJECTION, SOLUTION INTRAMUSCULAR; INTRAVENOUS at 18:10

## 2022-11-10 RX ADMIN — SODIUM CHLORIDE, PRESERVATIVE FREE 10 ML: 5 INJECTION INTRAVENOUS at 20:48

## 2022-11-10 RX ADMIN — POTASSIUM CHLORIDE 20 MEQ: 1500 TABLET, EXTENDED RELEASE ORAL at 18:13

## 2022-11-10 RX ADMIN — AMIODARONE HYDROCHLORIDE 150 MG: 1.5 INJECTION, SOLUTION INTRAVENOUS at 16:14

## 2022-11-10 RX ADMIN — Medication 8 MCG/MIN: at 01:24

## 2022-11-10 RX ADMIN — OXYCODONE 10 MG: 5 TABLET ORAL at 14:35

## 2022-11-10 RX ADMIN — FAMOTIDINE 20 MG: 20 TABLET ORAL at 01:25

## 2022-11-10 RX ADMIN — SENNOSIDES AND DOCUSATE SODIUM 1 TABLET: 50; 8.6 TABLET ORAL at 20:47

## 2022-11-10 RX ADMIN — AMIODARONE HYDROCHLORIDE 0.5 MG/MIN: 1.8 INJECTION, SOLUTION INTRAVENOUS at 09:47

## 2022-11-10 RX ADMIN — SENNOSIDES AND DOCUSATE SODIUM 1 TABLET: 50; 8.6 TABLET ORAL at 10:43

## 2022-11-10 RX ADMIN — FUROSEMIDE 20 MG: 10 INJECTION, SOLUTION INTRAMUSCULAR; INTRAVENOUS at 10:30

## 2022-11-10 RX ADMIN — OXYCODONE 10 MG: 5 TABLET ORAL at 20:47

## 2022-11-10 RX ADMIN — OXYCODONE 10 MG: 5 TABLET ORAL at 01:25

## 2022-11-10 RX ADMIN — AMIODARONE HYDROCHLORIDE 0.5 MG/MIN: 1.8 INJECTION, SOLUTION INTRAVENOUS at 20:56

## 2022-11-10 RX ADMIN — ATORVASTATIN CALCIUM 20 MG: 20 TABLET, FILM COATED ORAL at 20:49

## 2022-11-10 RX ADMIN — FAMOTIDINE 20 MG: 20 TABLET ORAL at 10:45

## 2022-11-10 RX ADMIN — MAGNESIUM HYDROXIDE 30 ML: 400 SUSPENSION ORAL at 20:47

## 2022-11-10 RX ADMIN — ENOXAPARIN SODIUM 40 MG: 100 INJECTION SUBCUTANEOUS at 11:01

## 2022-11-10 RX ADMIN — CEFAZOLIN 2000 MG: 10 INJECTION, POWDER, FOR SOLUTION INTRAVENOUS at 10:33

## 2022-11-10 RX ADMIN — ASPIRIN 325 MG: 325 TABLET, COATED ORAL at 10:43

## 2022-11-10 RX ADMIN — TAMSULOSIN HYDROCHLORIDE 0.4 MG: 0.4 CAPSULE ORAL at 10:50

## 2022-11-10 RX ADMIN — SODIUM CHLORIDE, PRESERVATIVE FREE 10 ML: 5 INJECTION INTRAVENOUS at 10:46

## 2022-11-10 RX ADMIN — AMIODARONE HYDROCHLORIDE 150 MG: 1.5 INJECTION, SOLUTION INTRAVENOUS at 04:20

## 2022-11-10 ASSESSMENT — PAIN SCALES - GENERAL
PAINLEVEL_OUTOF10: 3
PAINLEVEL_OUTOF10: 7

## 2022-11-10 ASSESSMENT — PAIN DESCRIPTION - LOCATION
LOCATION: CHEST
LOCATION: CHEST

## 2022-11-10 ASSESSMENT — PAIN SCALES - WONG BAKER: WONGBAKER_NUMERICALRESPONSE: 8

## 2022-11-10 ASSESSMENT — PAIN DESCRIPTION - DESCRIPTORS
DESCRIPTORS: SHARP
DESCRIPTORS: SHARP

## 2022-11-10 ASSESSMENT — PAIN DESCRIPTION - ORIENTATION: ORIENTATION: MID

## 2022-11-10 NOTE — PROGRESS NOTES
[address] THERAPY  [facility] ICU 4D  EVALUATION    Time:    Time In: 3019  Time Out: 0848  Timed Code Treatment Minutes: 10 Minutes  Minutes: 25          Date: 11/10/2022  Patient Name: Angela Fregoso,   Gender: male      MRN: [id number]  : 1945  (78 y.o.)  Referring Practitioner: Dr. Natalio Bangura  Diagnosis: abnormal findings on cardiac catheterization  Additional Pertinent Hx: Pt presented for scheduled heart cath on 2022. Per Cardio sx note: 78 y.o. male, s/p multiple PCI to LAD and transapical diagonal, originally presenting with exertional left parasternal, non-radiating chest pain relieved by rest, along with chronic progressive fatigue, underwent attempted PCI today. Procedure terminated with malaligned stent in LAD. Patient symptomatic with unstable angina after the procedure. s/p emergent CABG x 2 on 2022.     Restrictions/Precautions:  Restrictions/Precautions: Surgical Protocols, Fall Risk  Position Activity Restriction  Sternal Precautions: No Pushing, No Pulling, 10# Lifting Restrictions  Other position/activity restrictions: s/p CABG x 2 on 2022    Subjective  Chart Reviewed: Yes, Orders, Progress Notes, History and Physical  Patient assessed for rehabilitation services?: Yes  Family / Caregiver Present: No    Subjective: Pt uncomfortable in recliner upon arrival of therapist. Nurse requested A for getting Pt back to bed    Pain: no number given/10: reports pain in incision & shoulder blades-nurse gave meds during session    Vitals: Nurse checked vitals prior to session    Social/Functional History:  Lives With: Spouse  Type of Home: House  Home Layout: One level  Home Access: Stairs to enter without rails  Entrance Stairs - Number of Steps: 1 small threshold  Home Equipment: Rollator, Cane   Bathroom Shower/Tub: Tub/Shower unit (Pt reports he likes to get down into tub)  Bathroom Toilet: Standard       ADL Assistance: Needs assistance (occassional A-Pt unable to clarify with what he needed A for)  Homemaking Assistance: Needs assistance  Ambulation Assistance: Independent  Transfer Assistance: Independent    Active : Yes  Occupation: Retired  Additional Comments: Per PT micaal, Pt amb with SC in home, rollator in community    VISION:WFL    HEARING:   mild [place]    COGNITION: Slow Processing, Decreased Problem Solving, and Decreased Safety Awareness. Pt was very delayed with answering questions. RANGE OF MOTION:  Bilateral Upper Extremity:  WFL    STRENGTH:  Bilateral Upper Extremity:  Not Tested    SENSATION:   WFL    ADL:   Lower Extremity Dressing: Dependent. For adjusting slipper socks . **initiated education on sternal precautions    BALANCE:  Sitting Balance:  Contact Guard Assistance. Standing Balance: Minimal Assistance, X 2. Posterior lean at times    BED MOBILITY:  Sit to Supine: Moderate Assistance, X 2    Rolling to L side with max A x 1    TRANSFERS:  Sit to Stand:  Minimal Assistance, X 2. From recliner; vcs for technique  Stand to Sit: Contact Guard Assistance, X 2. To EOB    FUNCTIONAL MOBILITY:  Assistive Device: None  Assist Level:  Minimal Assistance and X 2. Distance:  3ft to EOB from recliner        Exercise:  Completed 2/5 step 2 cardiac exercises x 10 reps while seated in recliner    Activity Tolerance:  Patient tolerance of  treatment: fair. Assessment:  Assessment: Pt demo decreased ADL & functional mobility indep over PLOF s/p emergent CABG on 11/8. Continued OT recommended to educate Pt on safety & adaptive strategies for returning to ADLs at home with sternal precautions.   Performance deficits / Impairments: Decreased functional mobility , Decreased ADL status, Decreased safe awareness, Decreased endurance, Decreased balance, Decreased cognition  Prognosis: Fair  REQUIRES OT FOLLOW-UP: Yes  Decision Making: Medium Complexity    Treatment Initiated: Treatment and education initiated within context of evaluation. Evaluation time included review of current medical information, gathering information related to past medical, social and functional history, completion of standardized testing, formal and informal observation of tasks, assessment of data and development of plan of care and goals. Treatment time included skilled education and facilitation of tasks to increase safety and independence with ADL's for improved functional independence and quality of life. Discharge Recommendations:  Continue to assess pending progress    Patient Education:     Patient Education  Education Given To: Patient  Education Provided: Role of Therapy, Plan of Care, ADL Adaptive Strategies, Transfer Training, Precautions  Education Method: Demonstration, Verbal  Barriers to Learning: Cognition  Education Outcome: Continued education needed    Equipment Recommendations: Other: Monitor pending progress; likely need BSC    Plan:  Times Per Week: 6x  Current Treatment Recommendations: Balance training, Self-Care / ADL, Equipment evaluation, education, & procurement, Functional mobility training, Endurance training, Safety education & training. See long-term goal time frame for expected duration of plan of care. If no long-term goals established, a short length of stay is anticipated.     Goals:  Patient goals : Pt did not state  Short Term Goals  Time Frame for Short Term Goals: until discharge  Short Term Goal 1: Pt will complete various sit-stand t/fs with min A x 1 & min vcs for sternal precautions  Short Term Goal 2: Pt will tolerate standing 4-5 min with CGA for increased ease of sinkside grooming  Short Term Goal 3: Pt will complete mobility to bedside chair or BSC with RW, CGA, & 0-2 vcs for safety  Short Term Goal 4: Pt will complete LE dressing with min A & adaptive strategies prn  Short Term Goal 5: Pt will tolerate BUE step 2 cardiac exercises x 10 reps with min RBs to increase endurance for BADL  Long Term Goals  Time Frame for Long Term Goals : No LTG set d/t short ELOS         Following session, patient left in safe position with all fall risk precautions in place.

## 2022-11-10 NOTE — PROGRESS NOTES
CT/CV Surgery Progress Note    11/10/2022 8:36 AM  Surgeon:  Dr. Christiano Parmar     Subjective: Mr. Adriana Avila is resting comfortably in bed, alert, and in no acute distress. Pt denies chest pressure, SOB, fever,chills, N/V/D. Burst of Afib again this am with IV Amio started. Epi down to 3 mcg. I/O last 3 completed shifts: In: 8657.5 [P.O.:590; I.V.:3947.6; Blood:1058; IV Piggyback:3061.9]  Out: 3796 [NLVZM:2523; Blood:2700; Chest Tube:1863]    Vital Signs: BP (!) 104/58   Pulse (!) 114   Temp 99.3 °F (37.4 °C) (Bladder)   Resp 29   Ht 5' 6\" (1.676 m)   Wt 234 lb (106.1 kg)   SpO2 93%   BMI 37.77 kg/m²    Temp (24hrs), Av.6 °F (37.6 °C), Min:99.1 °F (37.3 °C), Max:99.9 °F (37.7 °C)    Labs:   CBC:   Recent Labs     22  0922  18422  0722  1640 22  2000 11/10/22  0317   WBC 9.2  --  18.3* 10.0  --   --  13.9*   HGB 15.9   < > 9.9* 7.7* 6.3* 7.1* 7.1*   HCT 45.4   < > 28.5* 22.0* 18.1* 20.9* 20.8*   MCV 93.4  --  96.3* 96.5*  --   --  97.2*      < > 198 152  --   --  131   APTT 44.0*  --   --   --   --   --   --    INR 0.95  --  1.29* 1.26*  --   --   --     < > = values in this interval not displayed. BMP:   Recent Labs     22  0726 22  0811 22  1115 22  1210 22  2153 11/10/22  0317    144  --   --   --   --  139   K 3.7 4.2  --  4.5  --   --  4.0    109  --   --   --   --  104   CO2 20* 24  --   --   --   --  22*   BUN 12 14  --   --   --   --  20   CREATININE 0.9 1.1  --   --   --   --  1.3*   MG 2.2 2.1  --   --   --   --  1.9   POCGLU  --   --    < >  --    < > 225*  --     < > = values in this interval not displayed. Imaging:  Chest X-Ray: 11/10/2022   1. Low lung volumes, increased since the prior study. 2. Increased left basilar atelectasis.        Intake/Output Summary (Last 24 hours) at 11/10/2022 0836  Last data filed at 11/10/2022 0941  Gross per 24 hour   Intake 0396.73 ml   Output 1525 ml   Net 691.73 ml     Scheduled Meds:    furosemide  20 mg IntraVENous BID    potassium chloride  20 mEq Oral BID WC    insulin lispro  0-16 Units SubCUTAneous TID WC    insulin lispro  0-4 Units SubCUTAneous Nightly    tamsulosin  0.4 mg Oral Daily    sodium chloride flush  5-40 mL IntraVENous 2 times per day    enoxaparin  40 mg SubCUTAneous Daily    aspirin  325 mg Oral Daily    multivitamin  1 tablet Oral Daily with breakfast    sennosides-docusate sodium  1 tablet Oral BID    atorvastatin  20 mg Oral Nightly    famotidine  20 mg Oral BID    Or    famotidine (PEPCID) injection  20 mg IntraVENous BID    ceFAZolin (ANCEF) IVPB  2,000 mg IntraVENous Q8H     ROS: All neg unless specifically mentioned in subjective section. Exam:  General Appearance: alert ,conversing, in no acute distress  Cardiovascular: IIRR with no murmurs, rubs, clicks, or gallops  Pulmonary/Chest: clear to auscultation bilaterally- no wheezes, rales or rhonchi, normal air movement, no respiratory distress  Neurological: alert, oriented, normal speech, no focal findings or movement disorder noted  Sternum: Incision healing appropriately and no wound dehiscence noted.      Assessment:   Patient Active Problem List   Diagnosis    Hypertension    Hyperlipidemia    Anxiety    Abnormal findings on cardiac catheterization    Spondylolisthesis    CAD (coronary artery disease) cath 06/2011 40 to 50% stenosis in mid LAD, Patent diagonal stent,40 to 50% Mid Lcx stenosis,patent RCA stent EF 55%    GERD (gastroesophageal reflux disease)    Syncopal episodes    Paroxysmal atrial fibrillation (HCC)    [employer] linq loop recorder    Bradycardia    Angina of effort ([address]    Status post angioplasty with stent    S/P CABG x 2    CAD in native artery     Plan:   CXR reviewed- Continue daily CXR's   Wean Epi accordingly  Amio drip started for Afib with RVR  Keep chest tubes  Continue current therapy    The plan of care was discussed in detail with Dr. Monika Sanchez     Electronically signed by Caroline Downs PA-C on 11/10/2022 at 8:36 AM

## 2022-11-10 NOTE — PROGRESS NOTES
Comprehensive Nutrition Assessment    Type and Reason for Visit:  Initial, Consult (diet education, post-op CABG)    Nutrition Recommendations/Plan:   Recommend diet as tolerated. Will discontinue Activia yogurt as pt. Dislikes. Trial Magic Cups TID. Encouraged po, good nutrition at best efforts for healing. Will provide diet education as appropriate. Malnutrition Assessment:  Malnutrition Status: At risk for malnutrition (Comment) (11/10/22 4869)    Context:  Acute Illness     Findings of the 6 clinical characteristics of malnutrition:  Energy Intake:  Mild decrease in energy intake (Comment)  Weight Loss:  No significant weight loss     Body Fat Loss:  No significant body fat loss     Muscle Mass Loss:  No significant muscle mass loss    Fluid Accumulation:  Unable to assess (edema noted)     Strength:  Not Performed    Nutrition Assessment:     Pt. nutritionally compromised AEB inadequate oral intake post-op. At risk for further nutrition compromise r/t increased nutrient needs for healing and underlying medical condition (hx HLD, HTN, a fib). Nutrition Related Findings:      Wound Type: Surgical Incision (CABG 11/8/22)     Pt. Report/Treatments/Miscellaneous: pt. Seen with wife; pt. Hasn't wanted to eat today; states not feeling well; having pain; c/o gas (not passing gas though); dislikes Activia yogurt; declines Ensure but wife requests that we trial Magic Cups TID; states typically consumes 2 meals/day and snacks  GI Status: no BM yet  Pertinent Labs: 11/10: Glucose 188, BUN 20, Cr 1.3; 11/8: Cholesterol 136, HDL 52, LDL 58, Triglycerides 131  Pertinent Meds: Lasix, MVI, Epi, Vasopressin, Senokot, Insulin      Current Nutrition Intake & Therapies:    Average Meal Intake: 0%, 26-50%     ADULT DIET; Regular; 3 carb choices (45 gm/meal);  Low Sodium (2 gm); 1800 ml  ADULT ORAL NUTRITION SUPPLEMENT; Breakfast, Lunch, Dinner; Frozen Oral Supplement    Anthropometric Measures:  Height: 5' 6\" (167.6 cm)  Ideal Body Weight (IBW): 142 lbs (65 kg)    Admission Body Weight: 234 lb (106.1 kg) (11/8 non-pitting edema (method not noted))  Current Body Weight: 234 lb (106.1 kg) (11/8 non-pitting edema (method not noted)),       Current BMI (kg/m2): 37.8  Usual Body Weight:  (per wife 230-234#; per EMR: 7/14/20: 239# 3 oz, 10/19/22: 234# 10 oz)                       BMI Categories: Obese Class 2 (BMI 35.0 -39.9)    Estimated Daily Nutrient Needs:  Energy Requirements Based On: Kcal/kg  Weight Used for Energy Requirements: Other (Comment) (106 kgm)  Energy (kcal/day): 5032-2372 kcals (15-18)  Weight Used for Protein Requirements: Ideal (65 kgm)  Protein (g/day): 78+ grams (1.2+) as renal function allows       Nutrition Diagnosis:   Inadequate oral intake related to inadequate protein-energy intake as evidenced by intake 0-25%    Nutrition Interventions:   Food and/or Nutrient Delivery: Continue Current Diet, Start Oral Nutrition Supplement  Nutrition Education/Counseling: Education needed (11/10 Encouraged po at best efforts for healing. Will need cardiac diet education prior to discharge as appropriate.)  Coordination of Nutrition Care: Continue to monitor while inpatient       Goals:     Goals: PO intake 75% or greater, by next RD assessment       Nutrition Monitoring and Evaluation:      Food/Nutrient Intake Outcomes: Diet Advancement/Tolerance, Food and Nutrient Intake, Supplement Intake  Physical Signs/Symptoms Outcomes: Biochemical Data, Chewing or Swallowing, GI Status, Fluid Status or Edema, Hemodynamic Status, Nutrition Focused Physical Findings, Skin, Weight    Discharge Planning:     Too soon to determine     Fiona Roberts RD, LD  Contact: [phone]

## 2022-11-10 NOTE — FLOWSHEET NOTE
[address]  PHYSICAL THERAPY MISSED TREATMENT NOTE  [facility] ICU 4D    Date: 11/10/2022  Patient Name: Angela Fregoso        MRN: [id number]   : 1945  (78 y.o.)  Gender: male   Referring Practitioner: Alfonso Pacheco MD  Diagnosis: Abnormal findings on cardiac catheterization         REASON FOR MISSED TREATMENT:  Missed Treat. RN approved session. Patient laying in bed upon arrival, wife present at bedside. Patient's wife reported increased confusion from pain meds. Patient politely declined physical therapy this afternoon. Will resume therapy on next appropriate date per POC.

## 2022-11-11 ENCOUNTER — APPOINTMENT (OUTPATIENT)
Dept: GENERAL RADIOLOGY | Age: 77
DRG: 235 | End: 2022-11-11
Attending: INTERNAL MEDICINE
Payer: MEDICARE

## 2022-11-11 LAB
AMORPHOUS: ABNORMAL
ANION GAP SERPL CALCULATED.3IONS-SCNC: 11 MEQ/L (ref 8–16)
BACTERIA: ABNORMAL
BILIRUBIN URINE: ABNORMAL
BLOOD, URINE: ABNORMAL
BUN BLDV-MCNC: 22 MG/DL (ref 7–22)
CALCIUM SERPL-MCNC: 8.4 MG/DL (ref 8.5–10.5)
CASTS: ABNORMAL /LPF
CASTS: ABNORMAL /LPF
CHARACTER, URINE: ABNORMAL
CHLORIDE BLD-SCNC: 106 MEQ/L (ref 98–111)
CO2: 25 MEQ/L (ref 23–33)
COLOR: ABNORMAL
CREAT SERPL-MCNC: 1 MG/DL (ref 0.4–1.2)
CRYSTALS: ABNORMAL
EPITHELIAL CELLS, UA: ABNORMAL /HPF
ERYTHROCYTE [DISTWIDTH] IN BLOOD BY AUTOMATED COUNT: 14.7 % (ref 11.5–14.5)
ERYTHROCYTE [DISTWIDTH] IN BLOOD BY AUTOMATED COUNT: 50.6 FL (ref 35–45)
GFR SERPL CREATININE-BSD FRML MDRD: > 60 ML/MIN/1.73M2
GLUCOSE BLD-MCNC: 161 MG/DL (ref 70–108)
GLUCOSE BLD-MCNC: 182 MG/DL (ref 70–108)
GLUCOSE BLD-MCNC: 196 MG/DL (ref 70–108)
GLUCOSE BLD-MCNC: 209 MG/DL (ref 70–108)
GLUCOSE BLD-MCNC: 213 MG/DL (ref 70–108)
GLUCOSE, URINE: NEGATIVE MG/DL
HCT VFR BLD CALC: 23.9 % (ref 42–52)
HEMOGLOBIN: 8.4 GM/DL (ref 14–18)
ICTOTEST: NEGATIVE
KETONES, URINE: 15
LEUKOCYTE EST, POC: ABNORMAL
MCH RBC QN AUTO: 32.9 PG (ref 26–33)
MCHC RBC AUTO-ENTMCNC: 35.1 GM/DL (ref 32.2–35.5)
MCV RBC AUTO: 93.7 FL (ref 80–94)
MISCELLANEOUS LAB TEST RESULT: ABNORMAL
MISCELLANEOUS LAB TEST RESULT: ABNORMAL
MUCUS: ABNORMAL
NITRITE, URINE: NEGATIVE
PH UA: 5.5 (ref 5–9)
PLATELET # BLD: 133 THOU/MM3 (ref 130–400)
PMV BLD AUTO: 11.2 FL (ref 9.4–12.4)
POTASSIUM SERPL-SCNC: 3.7 MEQ/L (ref 3.5–5.2)
PROTEIN UA: 30 MG/DL
RBC # BLD: 2.55 MILL/MM3 (ref 4.7–6.1)
RBC URINE: ABNORMAL /HPF
RENAL EPITHELIAL, UA: ABNORMAL
SODIUM BLD-SCNC: 142 MEQ/L (ref 135–145)
SPECIFIC GRAVITY UA: > 1.03 (ref 1–1.03)
UROBILINOGEN, URINE: 1 EU/DL (ref 0–1)
WBC # BLD: 13.5 THOU/MM3 (ref 4.8–10.8)
WBC UA: ABNORMAL /HPF
YEAST: ABNORMAL

## 2022-11-11 PROCEDURE — 51702 INSERT TEMP BLADDER CATH: CPT

## 2022-11-11 PROCEDURE — 97530 THERAPEUTIC ACTIVITIES: CPT

## 2022-11-11 PROCEDURE — 51798 US URINE CAPACITY MEASURE: CPT

## 2022-11-11 PROCEDURE — 2500000003 HC RX 250 WO HCPCS: Performed by: THORACIC SURGERY (CARDIOTHORACIC VASCULAR SURGERY)

## 2022-11-11 PROCEDURE — 81001 URINALYSIS AUTO W/SCOPE: CPT

## 2022-11-11 PROCEDURE — 82948 REAGENT STRIP/BLOOD GLUCOSE: CPT

## 2022-11-11 PROCEDURE — 2580000003 HC RX 258: Performed by: THORACIC SURGERY (CARDIOTHORACIC VASCULAR SURGERY)

## 2022-11-11 PROCEDURE — 6370000000 HC RX 637 (ALT 250 FOR IP): Performed by: THORACIC SURGERY (CARDIOTHORACIC VASCULAR SURGERY)

## 2022-11-11 PROCEDURE — 97116 GAIT TRAINING THERAPY: CPT

## 2022-11-11 PROCEDURE — 71045 X-RAY EXAM CHEST 1 VIEW: CPT

## 2022-11-11 PROCEDURE — 80048 BASIC METABOLIC PNL TOTAL CA: CPT

## 2022-11-11 PROCEDURE — 6360000002 HC RX W HCPCS: Performed by: PHYSICIAN ASSISTANT

## 2022-11-11 PROCEDURE — 6360000002 HC RX W HCPCS: Performed by: THORACIC SURGERY (CARDIOTHORACIC VASCULAR SURGERY)

## 2022-11-11 PROCEDURE — 36415 COLL VENOUS BLD VENIPUNCTURE: CPT

## 2022-11-11 PROCEDURE — 2500000003 HC RX 250 WO HCPCS: Performed by: PHYSICIAN ASSISTANT

## 2022-11-11 PROCEDURE — 2000000000 HC ICU R&B

## 2022-11-11 PROCEDURE — 85027 COMPLETE CBC AUTOMATED: CPT

## 2022-11-11 PROCEDURE — APPSS30 APP SPLIT SHARED TIME 16-30 MINUTES: Performed by: PHYSICIAN ASSISTANT

## 2022-11-11 PROCEDURE — 51701 INSERT BLADDER CATHETER: CPT

## 2022-11-11 PROCEDURE — P9047 ALBUMIN (HUMAN), 25%, 50ML: HCPCS | Performed by: PHYSICIAN ASSISTANT

## 2022-11-11 RX ORDER — ALBUMIN (HUMAN) 12.5 G/50ML
25 SOLUTION INTRAVENOUS ONCE
Status: COMPLETED | OUTPATIENT
Start: 2022-11-11 | End: 2022-11-11

## 2022-11-11 RX ORDER — MIDODRINE HYDROCHLORIDE 10 MG/1
10 TABLET ORAL
Status: DISCONTINUED | OUTPATIENT
Start: 2022-11-11 | End: 2022-11-13

## 2022-11-11 RX ORDER — INSULIN GLARGINE 100 [IU]/ML
20 INJECTION, SOLUTION SUBCUTANEOUS 2 TIMES DAILY
Status: DISCONTINUED | OUTPATIENT
Start: 2022-11-11 | End: 2022-11-15 | Stop reason: HOSPADM

## 2022-11-11 RX ADMIN — SODIUM CHLORIDE, PRESERVATIVE FREE 10 ML: 5 INJECTION INTRAVENOUS at 09:00

## 2022-11-11 RX ADMIN — Medication 1 TABLET: at 09:21

## 2022-11-11 RX ADMIN — ENOXAPARIN SODIUM 40 MG: 100 INJECTION SUBCUTANEOUS at 09:21

## 2022-11-11 RX ADMIN — MIDODRINE HYDROCHLORIDE 10 MG: 10 TABLET ORAL at 12:11

## 2022-11-11 RX ADMIN — ATORVASTATIN CALCIUM 20 MG: 20 TABLET, FILM COATED ORAL at 22:02

## 2022-11-11 RX ADMIN — INSULIN LISPRO 4 UNITS: 100 INJECTION, SOLUTION INTRAVENOUS; SUBCUTANEOUS at 16:25

## 2022-11-11 RX ADMIN — POTASSIUM CHLORIDE 20 MEQ: 1500 TABLET, EXTENDED RELEASE ORAL at 16:25

## 2022-11-11 RX ADMIN — POTASSIUM CHLORIDE 20 MEQ: 29.8 INJECTION, SOLUTION INTRAVENOUS at 11:14

## 2022-11-11 RX ADMIN — SENNOSIDES AND DOCUSATE SODIUM 1 TABLET: 50; 8.6 TABLET ORAL at 22:02

## 2022-11-11 RX ADMIN — SODIUM CHLORIDE, PRESERVATIVE FREE 10 ML: 5 INJECTION INTRAVENOUS at 22:06

## 2022-11-11 RX ADMIN — FAMOTIDINE 20 MG: 20 TABLET ORAL at 09:21

## 2022-11-11 RX ADMIN — INSULIN GLARGINE 20 UNITS: 100 INJECTION, SOLUTION SUBCUTANEOUS at 23:55

## 2022-11-11 RX ADMIN — AMIODARONE HYDROCHLORIDE 150 MG: 1.5 INJECTION, SOLUTION INTRAVENOUS at 12:04

## 2022-11-11 RX ADMIN — OXYCODONE 10 MG: 5 TABLET ORAL at 16:08

## 2022-11-11 RX ADMIN — AMIODARONE HYDROCHLORIDE 150 MG: 1.5 INJECTION, SOLUTION INTRAVENOUS at 08:51

## 2022-11-11 RX ADMIN — SENNOSIDES AND DOCUSATE SODIUM 1 TABLET: 50; 8.6 TABLET ORAL at 09:21

## 2022-11-11 RX ADMIN — ALBUMIN (HUMAN) 25 G: 0.25 INJECTION, SOLUTION INTRAVENOUS at 09:00

## 2022-11-11 RX ADMIN — TAMSULOSIN HYDROCHLORIDE 0.4 MG: 0.4 CAPSULE ORAL at 09:21

## 2022-11-11 RX ADMIN — FUROSEMIDE 20 MG: 10 INJECTION, SOLUTION INTRAMUSCULAR; INTRAVENOUS at 18:51

## 2022-11-11 RX ADMIN — AMIODARONE HYDROCHLORIDE 0.5 MG/MIN: 1.8 INJECTION, SOLUTION INTRAVENOUS at 07:52

## 2022-11-11 RX ADMIN — FAMOTIDINE 20 MG: 20 TABLET ORAL at 22:08

## 2022-11-11 RX ADMIN — POTASSIUM CHLORIDE 20 MEQ: 1500 TABLET, EXTENDED RELEASE ORAL at 09:21

## 2022-11-11 RX ADMIN — INSULIN LISPRO 4 UNITS: 100 INJECTION, SOLUTION INTRAVENOUS; SUBCUTANEOUS at 09:22

## 2022-11-11 RX ADMIN — Medication 17.6 MG: at 16:24

## 2022-11-11 RX ADMIN — POTASSIUM CHLORIDE 20 MEQ: 29.8 INJECTION, SOLUTION INTRAVENOUS at 11:59

## 2022-11-11 RX ADMIN — Medication 4 MCG/MIN: at 10:12

## 2022-11-11 RX ADMIN — ASPIRIN 325 MG: 325 TABLET, COATED ORAL at 09:21

## 2022-11-11 RX ADMIN — OXYCODONE 10 MG: 5 TABLET ORAL at 11:08

## 2022-11-11 RX ADMIN — MIDODRINE HYDROCHLORIDE 10 MG: 10 TABLET ORAL at 16:25

## 2022-11-11 RX ADMIN — OXYCODONE 10 MG: 5 TABLET ORAL at 06:15

## 2022-11-11 RX ADMIN — FUROSEMIDE 20 MG: 10 INJECTION, SOLUTION INTRAMUSCULAR; INTRAVENOUS at 09:23

## 2022-11-11 RX ADMIN — AMIODARONE HYDROCHLORIDE 0.5 MG/MIN: 1.8 INJECTION, SOLUTION INTRAVENOUS at 19:30

## 2022-11-11 ASSESSMENT — PAIN SCALES - GENERAL
PAINLEVEL_OUTOF10: 7
PAINLEVEL_OUTOF10: 3

## 2022-11-11 NOTE — PROGRESS NOTES
[address] [address]  [facility] ICU 4D  Occupational Therapy  Daily Note  Time:   Time In: 6893  Time Out: 0848  Timed Code Treatment Minutes: 34 Minutes  Minutes: 29          Date: 2022  Patient Name: Krishan Cortes,   Gender: male      Room: [facility]-A  MRN: [id number]  : 1945  (78 y.o.)  Referring Practitioner: Dr. Christoph Díaz  Diagnosis: abnormal findings on cardiac catheterization  Additional Pertinent Hx: Pt presented for scheduled heart cath on 2022. Per Cardio sx note: 78 y.o. male, s/p multiple PCI to LAD and transapical diagonal, originally presenting with exertional left parasternal, non-radiating chest pain relieved by rest, along with chronic progressive fatigue, underwent attempted PCI today. Procedure terminated with malaligned stent in LAD. Patient symptomatic with unstable angina after the procedure. s/p emergent CABG x 2 on 2022. Restrictions/Precautions:  Restrictions/Precautions: Surgical Protocols, Fall Risk  Position Activity Restriction  Sternal Precautions: No Pushing, No Pulling, 10# Lifting Restrictions  Other position/activity restrictions: s/p CABG x 2 on 2022     SUBJECTIVE: Pt sitting in chair upon arrival, pt agreeable to OT session, RN gave verbal approval for session. Pt demo'd increased confusion, this date, and stating increased fatigue    PAIN: 0/10: does not report pain     Vitals: Vitals not assessed per clinical judgement, see nursing flowsheet    COGNITION: Slow Processing, Decreased Insight, and Impaired Memory    ADL:   No ADL's completed this session. aEgle Kelly BALANCE:  Sitting Balance:  Stand By Assistance. With pt completing 2/5 CABG exercises with pt sitting for 6 minutes  Standing Balance: Contact Guard Assistance.  With RW, and BUE support on the walker, with pt educated on amount of pressure, with pt able to apply less pressure to maintain    BED MOBILITY:  Sit to Supine: Maximum Assistance with the [facility] lowered, with therapist helping with BLE and to maintain lines     TRANSFERS:  Sit to Stand:  Minimal Assistance. From the recliner with good adherence to sternal precautions  Stand to Sit: Minimal Assistance. To the EOB    FUNCTIONAL MOBILITY:  Assistive Device: Rolling Walker  Assist Level:  Contact Guard Assistance. Distance:  from the recliner to the EOB, with max vc's to bring LLE back to the EOB due to level of cognition        ADDITIONAL ACTIVITIES:  Pt completed CABG Step II exercises x10 reps x2 exercises this date in order to increase strength and improve activity tolerance for ADLs and homemaking tasks. Pt exhibited moderate fatigue during task, requring min rest breaks and min VCs for technique. ASSESSMENT:     Activity Tolerance:  Patient tolerance of  treatment: good. Discharge Recommendations: Continue to assess pending progress  Equipment Recommendations:  Other: Monitor pending progress; likely need BSC  Plan: Times Per Week: 6x  Current Treatment Recommendations: Balance training, Self-Care / ADL, Equipment evaluation, education, & procurement, Functional mobility training, Endurance training, Safety education & training    Patient Education  Patient Education: ADL's    Goals  Short Term Goals  Time Frame for Short Term Goals: until discharge  Short Term Goal 1: Pt will complete various sit-stand t/fs with min A x 1 & min vcs for sternal precautions  Short Term Goal 2: Pt will tolerate standing 4-5 min with CGA for increased ease of sinkside grooming  Short Term Goal 3: Pt will complete mobility to bedside chair or BSC with RW, CGA, & 0-2 vcs for safety  Short Term Goal 4: Pt will complete LE dressing with min A & adaptive strategies prn  Short Term Goal 5: Pt will tolerate BUE step 2 cardiac exercises x 10 reps with min RBs to increase endurance for BADL  Long Term Goals  Time Frame for Long Term Goals : No LTG set d/t short ELOS    Following session, patient left in safe position with all fall risk precautions in place.

## 2022-11-11 NOTE — CARE COORDINATION
11/11/22, 3:15 PM EST    DISCHARGE ON GOING EVALUATION    Faith Kehr day: 2  Location: 4D-11/011-A Reason for admit: Abnormal findings on cardiac catheterization [R93.1]  Status post angioplasty with stent [Z95.820]  CAD in native artery [I25.10]   Procedure:   11/8 Cardiac Cath: Left heart cath and PCI to D1 and proximal LAD. Small piece of coronary wire had broken of after being intertwined with LAD stent/ The LAD had malformed due to broken coronary wire when attempted to retrieve it. Unable to cross the LAD stent to expend it. Terminated the procedure  11/8 Emergent 2v CABG  11/8 Intubated - 11/9 Extubated  11/11 CXR: Stable left basilar airspace disease    Barriers to Discharge: POD #3. Still on epi drip. Afib persists, on amio drip, received amio bolus x2 today. No BM yet. Sats 98% on 3L O2. Tmax 99.7. Afib 70-80's. Ox4. CT x4 to -20sx with 590 ml out in 24 hours. CR/PT/OT. Dietitian consulted. Dietary ileus prevention protocol. Telemetry, I&O, daily weight, IS, sternal precautions, CT care, wound care, SCDs, can care, ambulate. Amio @ 0.5 mg/min, Epi @ 4 mcg/min, asa, lipitor, pepcid, IV lasix 20 mg bid, SSI, midodrine tid, prn roxicodone, K+, flomax, electrolyte replacement protocols. Received IV albumin x1 today. WBC 13.5, hgb 8.4. PCP: Sherie Essex, MD  Readmission Risk Score: 13.2%  Patient Goals/Plan/Treatment Preferences: Home with wife and new HH. Has DME. SW on case.

## 2022-11-11 NOTE — CARE COORDINATION
11/11/22, 10:38 AM EST    DISCHARGE PLANNING EVALUATION     Sw made a supportive contact with pt and wife while in ICU, wife states she has not reviewed [facility] list yet and will attempt to do that today.

## 2022-11-11 NOTE — PROGRESS NOTES
Inpatient Cardiac Rehabilitation Consult    Received consult for Phase II Cardiac Rehabilitation. Patient needs cardiac rehab due to CABG on 11/8/22. Importance of Cardiac Rehab discussed with patient. Reviewed cardiac rehab class times. Patient questions answered. Luca Bucio would like to participate in cardiac rehab at [facility]. Will send referral to them. Cardiac Rehab brochure given.

## 2022-11-11 NOTE — PROGRESS NOTES
Assessment: This was an attempted spiritual care visit as a part of rounds. Patient receiving care and is unavailable at this time. Plan of Care:   team will remain available for patient/family, PRN. [address] [facility]. [address] [place] [address] Kwaku Narayanan, [address]  [phone]

## 2022-11-11 NOTE — PLAN OF CARE
Problem: Discharge Planning  Goal: Discharge to home or other facility with appropriate resources  Outcome: Progressing  Flowsheets (Taken 11/11/2022 0041)  Discharge to home or other facility with appropriate resources:   Identify barriers to discharge with patient and caregiver   Arrange for needed discharge resources and transportation as appropriate   Identify discharge learning needs (meds, wound care, etc)     Problem: Safety - Adult  Goal: Free from fall injury  Outcome: Progressing  Flowsheets (Taken 11/11/2022 0041)  Free From Fall Injury: Instruct family/caregiver on patient safety     Problem: Neurosensory - Adult  Goal: Achieves maximal functionality and self care  Outcome: Progressing  Flowsheets (Taken 11/11/2022 0041)  Achieves maximal functionality and self care: Monitor swallowing and airway patency with patient fatigue and changes in neurological status     Problem: Respiratory - Adult  Goal: Achieves optimal ventilation and oxygenation  Outcome: Progressing  Flowsheets (Taken 11/11/2022 0041)  Achieves optimal ventilation and oxygenation:   Assess for changes in respiratory status   Assess for changes in mentation and behavior   Assess and instruct to report shortness of breath or any respiratory difficulty   Position to facilitate oxygenation and minimize respiratory effort     Problem: Cardiovascular - Adult  Goal: Absence of cardiac dysrhythmias or at baseline  Outcome: Progressing  Flowsheets (Taken 11/11/2022 0041)  Absence of cardiac dysrhythmias or at baseline: Monitor cardiac rate and rhythm     Problem: Skin/Tissue Integrity - Adult  Goal: Incisions, wounds, or drain sites healing without S/S of infection  Outcome: Progressing  Flowsheets  Taken 11/11/2022 0041  Incisions, Wounds, or Drain Sites Healing Without Sign and Symptoms of Infection: ADMISSION and DAILY: Assess and document risk factors for pressure ulcer development  Taken 11/11/2022 0040  Incisions, Wounds, or Drain Sites Healing Without Sign and Symptoms of Infection: ADMISSION and DAILY: Assess and document risk factors for pressure ulcer development     Problem: Gastrointestinal - Adult  Goal: Maintains adequate nutritional intake  Outcome: Progressing  Flowsheets (Taken 11/11/2022 0041)  Maintains adequate nutritional intake:   Monitor percentage of each meal consumed   Identify factors contributing to decreased intake, treat as appropriate     Problem: Genitourinary - Adult  Goal: Absence of urinary retention  Outcome: Progressing  Flowsheets (Taken 11/11/2022 0041)  Absence of urinary retention:   Assess patients ability to void and empty bladder   Monitor intake/output and perform bladder scan as needed     Problem: Infection - Adult  Goal: Absence of infection during hospitalization  Outcome: Progressing     Problem: Pain  Goal: Verbalizes/displays adequate comfort level or baseline comfort level  Outcome: Progressing  Flowsheets (Taken 11/11/2022 0041)  Verbalizes/displays adequate comfort level or baseline comfort level:   Encourage patient to monitor pain and request assistance   Assess pain using appropriate pain scale   Administer analgesics based on type and severity of pain and evaluate response   Implement non-pharmacological measures as appropriate and evaluate response     Problem: Nutrition Deficit:  Goal: Optimize nutritional status  11/11/2022 0041 by Andrew Pardo RN  Outcome: Progressing  Flowsheets (Taken 11/11/2022 0041)  Nutrient intake appropriate for improving, restoring, or maintaining nutritional needs:   Assess nutritional status and recommend course of action   Monitor oral intake, labs, and treatment plans   Recommend appropriate diets, oral nutritional supplements, and vitamin/mineral supplements     Problem: Skin/Tissue Integrity  Goal: Absence of new skin breakdown  Description: 1. Monitor for areas of redness and/or skin breakdown  2. Assess vascular access sites hourly  3.   Every 4-6 hours minimum:  Change oxygen saturation probe site  4. Every 4-6 hours:  If on nasal continuous positive airway pressure, respiratory therapy assess nares and determine need for appliance change or resting period. Outcome: Progressing   Care plan reviewed with patient and family. Patient and family verbalize understanding of the plan of care and contribute to goal setting.

## 2022-11-11 NOTE — PROGRESS NOTES
[facility] PHYSICAL THERAPY  DAILY NOTE  [facility] ICU 4D - 4D--A    Time In: 6723  Time Out: 1152  Timed Code Treatment Minutes: 25 Minutes  Minutes: 25          Date: 2022  Patient Name: Letcher Schirmer,  Gender:  male        MRN: [id number]  : 1945  (78 y.o.)     Referring Practitioner: Kaz Adams MD  Diagnosis: Abnormal findings on cardiac catheterization  Additional Pertinent Hx: 78 y.o. male, s/p multiple PCI to LAD and transapical diagonal, originally presenting with exertional left parasternal, non-radiating chest pain relieved by rest, along with chronic progressive fatigue, underwent attempted PCI today. Procedure terminated with malaligned stent in LAD. Patient symptomatic with unstable angina after the procedure. Pt is s/p emergent CABG x 2 . Prior Level of Function:  Lives With: Spouse  Type of Home: House  Home Layout: One level  Home Access: Stairs to enter without rails  Entrance Stairs - Number of Steps: 1 small threshold  Home Equipment: Rollator, Cane   Bathroom Shower/Tub: Tub/Shower unit (Pt reports he likes to get down into tub)  Bathroom Toilet: Standard    ADL Assistance: Needs assistance (occassional A-Pt unable to clarify with what he needed A for)  Homemaking Assistance: Needs assistance  Ambulation Assistance: Independent  Transfer Assistance: Independent  Active : Yes  Additional Comments: Per PT eliecer, Pt amb with SC in home, rollator in community    Restrictions/Precautions:  Restrictions/Precautions: Surgical Protocols, Fall Risk  Position Activity Restriction  Sternal Precautions: No Pushing, No Pulling, 10# Lifting Restrictions  Other position/activity restrictions: s/p CABG x 2 on 2022     SUBJECTIVE: RN approved session. Patient in bed at arrival and agreeable to therapy. Patient's girlfriend present throughout session and supportive. RN present to assist with line management while ambulating to the chair.  After ambulation, patient requested to end session due to fatigue. PAIN: 0/10: Denied     Vitals: Blood Pressure: 106/63 spine at beginning of session   Oxygen: 95-98% on 3L   Heart Rate:  bpm with all movement. OBJECTIVE:  Bed Mobility:  Supine to Sit: Moderate Assistance, X 1, with head of bed raised, with verbal cues   Scooting: Minimal Assistance, X 1, to EOB     Transfers:  Sit to Stand: Minimal Assistance, X 1  Stand to [facility] 68, X 1, cues to square up to chair for safety     Ambulation:  Minimal Assistance, X 1, Contact Guard of second staff member   Distance: 3' to bedside chair   Surface: Level Tile  Device:Rolling Walker  Gait Deviations:  Slow Lin, Decreased Step Length Bilaterally, Decreased Gait Speed, Narrow Base of Support, and Unsteady Gait  *Assistance needed to manage all lines and to properly guide walker. Balance:  Static Sitting Balance:  Contact Guard Assistance  Static Standing Balance: Contact Guard Assistance, with RW   *Patient sat at EOB for about 5 minutes prior to ambulating to chair per request.     Exercise:  None completed. Functional Outcome Measures: Completed  AM-PAC Inpatient Mobility without Stair Climbing Raw Score : 11  AM-PAC Inpatient without Stair Climbing T-Scale Score : 35.66    ASSESSMENT:  Assessment: Patient progressing toward established goals. Activity Tolerance:  Patient tolerance of  treatment: good.       Equipment Recommendations:Equipment Needed: No  Discharge Recommendations: Continue to assess pending progress  Plan: Current Treatment Recommendations: Strengthening, Balance training, Functional mobility training, Transfer training, Endurance training, Neuromuscular re-education, Gait training, Stair training, Patient/Caregiver education & training, Therapeutic activities, Home exercise program, Safety education & training  General Plan:  (6x CABG)    Patient Education  Patient Education: Plan of Care    Goals:  Patient Goals : to go home  Short Term Goals  Time Frame for Short Term Goals: by discharge  Short Term Goal 1: Pt to transfer supine <--> sit SBA to enable pt to get in/out of bed. Short Term Goal 2: Pt to transfer sit <--> stand SBA for increased functional mobility. Short Term Goal 3: Pt to ambulate >200 feet with LRAD SBA for household ambulation. Short Term Goal 4: Pt to ascend/descend 1 step with AD CGA for home entry. Long Term Goals  Time Frame for Long Term Goals : NA due to short length of stay. Following session, patient left in safe position with all fall risk precautions in place.

## 2022-11-11 NOTE — PROGRESS NOTES
Physician Progress Note      PATIENT:               Roshan Fuentes  CSN #:                  [id number]  :                       1945  ADMIT DATE:       2022 8:39 AM  [address] [place] DATE:  Uvaldo Jonathan  PROVIDER #:        Pauline Reyes MD          QUERY TEXT:    Dr Juan Manuel Mendez,    Pt admitted with CAD and underwent coronary artery bypass. If possible,   please document in the progress notes and discharge summary if you are   evaluating and/or treating any of the following: The medical record reflects the following:  Risk Factors: Elderly  Clinical Indicators: Pt requiring 4LNC more than 24hrs post op, increased   respirations 31, LS diminished  Treatment: Oxygen, ICU monitoring, lab monitoring, imaging    Thank You! Eros Figueroa RN, CRCR  RN Clinical Documentation Integrity  (M) [phone] (J) [phone]  Options provided:  -- Acute pulmonary insufficiency following surgery  -- No Acute pulmonary insufficiency, oxygen for comfort only  -- Other - I will add my own diagnosis  -- Disagree - Not applicable / Not valid  -- Disagree - Clinically unable to determine / Unknown  -- Refer to Clinical Documentation Reviewer    PROVIDER RESPONSE TEXT:    This patient has acute postoperative pulmonary insufficiency.     Query created by: Mickey Proctor on 11/10/2022 7:54 AM      Electronically signed by:  Pauline Reyes MD 2022 9:14 AM

## 2022-11-11 NOTE — PROGRESS NOTES
CT/CV Surgery Progress Note    2022 9:16 AM  Surgeon:  Dr. Verónica Cruz     Subjective: Mr. Mook Betancourt is resting comfortably in bed, alert, and in no acute distress. Pt denies chest pressure, SOB, fever,chills, N/V/D. Afib persist and currently on IV Amio. He is also on 4 mcg of IV Epi as well. I/O last 3 completed shifts: In: 2639.3 [P.O.:540; I.V.:1046.3; Blood:310; IV [id number]:268]  Out: 8148 [Urine:2010; Chest Tube:915]    Vital Signs: /66   Pulse (!) 103   Temp 98.5 °F (36.9 °C) (Oral)   Resp 18   Ht 5' 6\" (1.676 m)   Wt 240 lb 4.8 oz (109 kg)   SpO2 96%   BMI 38.79 kg/m²    Temp (24hrs), Av.4 °F (37.4 °C), Min:98.5 °F (36.9 °C), Max:99.7 °F (37.6 °C)    Labs:   CBC:   Recent Labs     22  2310 22  0726 22  1640 11/10/22  0317 11/10/22  1420 22  0400   WBC 18.3* 10.0  --  13.9*  --  13.5*   HGB 9.9* 7.7*   < > 7.1* 8.0* 8.4*   HCT 28.5* 22.0*   < > 20.8* 22.9* 23.9*   MCV 96.3* 96.5*  --  97.2*  --  93.7    152  --  131  --  133   INR 1.29* 1.26*  --   --   --   --     < > = values in this interval not displayed. BMP:   Recent Labs     22  2310 22  0726 22  0811 22  1115 22  1210 11/10/22  0317 11/10/22  1057 11/10/22  2059 11/11/22  0400    144  --   --   --  139  --   --  142   K 3.7 4.2  --  4.5  --  4.0  --   --  3.7    109  --   --   --  104  --   --  106   CO2 20* 24  --   --   --  22*  --   --  25   BUN 12 14  --   --   --  20  --   --  22   CREATININE 0.9 1.1  --   --   --  1.3*  --   --  1.0   MG 2.2 2.1  --   --   --  1.9  --   --   --    POCGLU  --   --    < >  --    < >  --    < > 173*  --     < > = values in this interval not displayed. Imaging:  Chest X-Ray: 2022   Stable left basilar airspace disease.       Intake/Output Summary (Last 24 hours) at 2022 0916  Last data filed at 2022 0814  Gross per 24 hour   Intake 877.72 ml   Output 2490 ml   Net -1612.28 ml Scheduled Meds:    albumin human  25 g IntraVENous Once    furosemide  20 mg IntraVENous BID    potassium chloride  20 mEq Oral BID WC    insulin lispro  0-16 Units SubCUTAneous TID WC    insulin lispro  0-4 Units SubCUTAneous Nightly    tamsulosin  0.4 mg Oral Daily    sodium chloride flush  5-40 mL IntraVENous 2 times per day    enoxaparin  40 mg SubCUTAneous Daily    aspirin  325 mg Oral Daily    multivitamin  1 tablet Oral Daily with breakfast    sennosides-docusate sodium  1 tablet Oral BID    atorvastatin  20 mg Oral Nightly    famotidine  20 mg Oral BID    Or    famotidine (PEPCID) injection  20 mg IntraVENous BID     ROS: All neg unless specifically mentioned in subjective section. Exam:  General Appearance: slightly slow to respond, but appropriate. No acute distress  Cardiovascular: IIRR with no murmurs, rubs, clicks, or gallops  Pulmonary/Chest: bilateral basilar crackles  Neurological: oriented, normal speech, no focal findings or movement disorder noted  Sternum: Incision healing appropriately and no wound dehiscence noted.      Assessment:   Patient Active Problem List   Diagnosis    Hypertension    Hyperlipidemia    Anxiety    Abnormal findings on cardiac catheterization    Spondylolisthesis    CAD (coronary artery disease) cath 06/2011 40 to 50% stenosis in mid LAD, Patent diagonal stent,40 to 50% Mid Lcx stenosis,patent RCA stent EF 55%    GERD (gastroesophageal reflux disease)    Syncopal episodes    Paroxysmal atrial fibrillation (HCC)    [employer] linq loop recorder    Bradycardia    Angina of effort ([address]    Status post angioplasty with stent    S/P CABG x 2    CAD in native artery     Plan:   CXR reviewed- Continue daily CXR's   Wean Epi accordingly  Amio drip started for Afib with RVR  Keep chest tubes  Continue current therapy    The plan of care was discussed in detail with Dr. Hyun Castro     Electronically signed by Janel Ferro PA-C on 11/11/2022 at 9:16 AM

## 2022-11-12 ENCOUNTER — APPOINTMENT (OUTPATIENT)
Dept: GENERAL RADIOLOGY | Age: 77
DRG: 235 | End: 2022-11-12
Attending: INTERNAL MEDICINE
Payer: MEDICARE

## 2022-11-12 LAB
ANION GAP SERPL CALCULATED.3IONS-SCNC: 11 MEQ/L (ref 8–16)
BUN BLDV-MCNC: 23 MG/DL (ref 7–22)
CALCIUM SERPL-MCNC: 8.4 MG/DL (ref 8.5–10.5)
CHLORIDE BLD-SCNC: 104 MEQ/L (ref 98–111)
CO2: 25 MEQ/L (ref 23–33)
CREAT SERPL-MCNC: 1 MG/DL (ref 0.4–1.2)
ERYTHROCYTE [DISTWIDTH] IN BLOOD BY AUTOMATED COUNT: 14.6 % (ref 11.5–14.5)
ERYTHROCYTE [DISTWIDTH] IN BLOOD BY AUTOMATED COUNT: 51.1 FL (ref 35–45)
GFR SERPL CREATININE-BSD FRML MDRD: > 60 ML/MIN/1.73M2
GLUCOSE BLD-MCNC: 116 MG/DL (ref 70–108)
GLUCOSE BLD-MCNC: 118 MG/DL (ref 70–108)
GLUCOSE BLD-MCNC: 133 MG/DL (ref 70–108)
GLUCOSE BLD-MCNC: 134 MG/DL (ref 70–108)
GLUCOSE BLD-MCNC: 141 MG/DL (ref 70–108)
GLUCOSE BLD-MCNC: 156 MG/DL (ref 70–108)
HCT VFR BLD CALC: 24.7 % (ref 42–52)
HEMOGLOBIN: 8.3 GM/DL (ref 14–18)
HEPARIN UNFRACTIONATED: 0.07 U/ML (ref 0.3–0.7)
MCH RBC QN AUTO: 32.3 PG (ref 26–33)
MCHC RBC AUTO-ENTMCNC: 33.6 GM/DL (ref 32.2–35.5)
MCV RBC AUTO: 96.1 FL (ref 80–94)
PLATELET # BLD: 177 THOU/MM3 (ref 130–400)
PMV BLD AUTO: 11.9 FL (ref 9.4–12.4)
POTASSIUM SERPL-SCNC: 3.9 MEQ/L (ref 3.5–5.2)
RBC # BLD: 2.57 MILL/MM3 (ref 4.7–6.1)
SODIUM BLD-SCNC: 140 MEQ/L (ref 135–145)
WBC # BLD: 11.1 THOU/MM3 (ref 4.8–10.8)

## 2022-11-12 PROCEDURE — 36556 INSERT NON-TUNNEL CV CATH: CPT | Performed by: INTERNAL MEDICINE

## 2022-11-12 PROCEDURE — 71045 X-RAY EXAM CHEST 1 VIEW: CPT

## 2022-11-12 PROCEDURE — 80048 BASIC METABOLIC PNL TOTAL CA: CPT

## 2022-11-12 PROCEDURE — 6360000002 HC RX W HCPCS: Performed by: THORACIC SURGERY (CARDIOTHORACIC VASCULAR SURGERY)

## 2022-11-12 PROCEDURE — 2500000003 HC RX 250 WO HCPCS: Performed by: THORACIC SURGERY (CARDIOTHORACIC VASCULAR SURGERY)

## 2022-11-12 PROCEDURE — 85027 COMPLETE CBC AUTOMATED: CPT

## 2022-11-12 PROCEDURE — 36556 INSERT NON-TUNNEL CV CATH: CPT

## 2022-11-12 PROCEDURE — 2580000003 HC RX 258: Performed by: THORACIC SURGERY (CARDIOTHORACIC VASCULAR SURGERY)

## 2022-11-12 PROCEDURE — 36556 INSERT NON-TUNNEL CV CATH: CPT | Performed by: NURSE PRACTITIONER

## 2022-11-12 PROCEDURE — 97116 GAIT TRAINING THERAPY: CPT

## 2022-11-12 PROCEDURE — 02HV33Z INSERTION OF INFUSION DEVICE INTO SUPERIOR VENA CAVA, PERCUTANEOUS APPROACH: ICD-10-PCS | Performed by: INTERNAL MEDICINE

## 2022-11-12 PROCEDURE — 2060000000 HC ICU INTERMEDIATE R&B

## 2022-11-12 PROCEDURE — 82948 REAGENT STRIP/BLOOD GLUCOSE: CPT

## 2022-11-12 PROCEDURE — 97530 THERAPEUTIC ACTIVITIES: CPT

## 2022-11-12 PROCEDURE — 6370000000 HC RX 637 (ALT 250 FOR IP): Performed by: THORACIC SURGERY (CARDIOTHORACIC VASCULAR SURGERY)

## 2022-11-12 PROCEDURE — 85520 HEPARIN ASSAY: CPT

## 2022-11-12 PROCEDURE — 36415 COLL VENOUS BLD VENIPUNCTURE: CPT

## 2022-11-12 RX ORDER — HEPARIN SODIUM 10000 [USP'U]/100ML
5-30 INJECTION, SOLUTION INTRAVENOUS CONTINUOUS
Status: DISCONTINUED | OUTPATIENT
Start: 2022-11-12 | End: 2022-11-14

## 2022-11-12 RX ORDER — ENOXAPARIN SODIUM 100 MG/ML
30 INJECTION SUBCUTANEOUS 2 TIMES DAILY
Status: DISCONTINUED | OUTPATIENT
Start: 2022-11-12 | End: 2022-11-12

## 2022-11-12 RX ORDER — POLYETHYLENE GLYCOL 3350 17 G/17G
17 POWDER, FOR SOLUTION ORAL DAILY PRN
Status: DISCONTINUED | OUTPATIENT
Start: 2022-11-12 | End: 2022-11-15 | Stop reason: HOSPADM

## 2022-11-12 RX ADMIN — SODIUM CHLORIDE, PRESERVATIVE FREE 10 ML: 5 INJECTION INTRAVENOUS at 08:48

## 2022-11-12 RX ADMIN — INSULIN GLARGINE 20 UNITS: 100 INJECTION, SOLUTION SUBCUTANEOUS at 08:43

## 2022-11-12 RX ADMIN — FAMOTIDINE 20 MG: 20 TABLET ORAL at 08:42

## 2022-11-12 RX ADMIN — OXYCODONE 10 MG: 5 TABLET ORAL at 16:07

## 2022-11-12 RX ADMIN — SODIUM CHLORIDE, PRESERVATIVE FREE 10 ML: 5 INJECTION INTRAVENOUS at 21:26

## 2022-11-12 RX ADMIN — ATORVASTATIN CALCIUM 20 MG: 20 TABLET, FILM COATED ORAL at 21:26

## 2022-11-12 RX ADMIN — MAGNESIUM HYDROXIDE 30 ML: 400 SUSPENSION ORAL at 09:05

## 2022-11-12 RX ADMIN — POTASSIUM CHLORIDE 20 MEQ: 1500 TABLET, EXTENDED RELEASE ORAL at 08:54

## 2022-11-12 RX ADMIN — HEPARIN SODIUM 9 UNITS/KG/HR: 10000 INJECTION, SOLUTION INTRAVENOUS at 17:04

## 2022-11-12 RX ADMIN — MIDODRINE HYDROCHLORIDE 10 MG: 10 TABLET ORAL at 08:43

## 2022-11-12 RX ADMIN — SENNOSIDES AND DOCUSATE SODIUM 1 TABLET: 50; 8.6 TABLET ORAL at 21:26

## 2022-11-12 RX ADMIN — FUROSEMIDE 20 MG: 10 INJECTION, SOLUTION INTRAMUSCULAR; INTRAVENOUS at 17:04

## 2022-11-12 RX ADMIN — MIDODRINE HYDROCHLORIDE 10 MG: 10 TABLET ORAL at 12:42

## 2022-11-12 RX ADMIN — OXYCODONE 10 MG: 5 TABLET ORAL at 21:25

## 2022-11-12 RX ADMIN — ACETAMINOPHEN 650 MG: 325 TABLET ORAL at 08:55

## 2022-11-12 RX ADMIN — TAMSULOSIN HYDROCHLORIDE 0.4 MG: 0.4 CAPSULE ORAL at 08:42

## 2022-11-12 RX ADMIN — OXYCODONE 10 MG: 5 TABLET ORAL at 08:55

## 2022-11-12 RX ADMIN — FAMOTIDINE 20 MG: 20 TABLET ORAL at 21:26

## 2022-11-12 RX ADMIN — AMIODARONE HYDROCHLORIDE 0.5 MG/MIN: 1.8 INJECTION, SOLUTION INTRAVENOUS at 05:40

## 2022-11-12 RX ADMIN — INSULIN GLARGINE 20 UNITS: 100 INJECTION, SOLUTION SUBCUTANEOUS at 21:44

## 2022-11-12 RX ADMIN — MIDODRINE HYDROCHLORIDE 10 MG: 10 TABLET ORAL at 16:04

## 2022-11-12 RX ADMIN — AMIODARONE HYDROCHLORIDE 0.5 MG/MIN: 1.8 INJECTION, SOLUTION INTRAVENOUS at 17:54

## 2022-11-12 RX ADMIN — FUROSEMIDE 20 MG: 10 INJECTION, SOLUTION INTRAMUSCULAR; INTRAVENOUS at 08:43

## 2022-11-12 RX ADMIN — POTASSIUM CHLORIDE 20 MEQ: 1500 TABLET, EXTENDED RELEASE ORAL at 16:04

## 2022-11-12 RX ADMIN — SENNOSIDES AND DOCUSATE SODIUM 1 TABLET: 50; 8.6 TABLET ORAL at 08:42

## 2022-11-12 RX ADMIN — ENOXAPARIN SODIUM 40 MG: 100 INJECTION SUBCUTANEOUS at 08:43

## 2022-11-12 RX ADMIN — SODIUM CHLORIDE: 9 INJECTION, SOLUTION INTRAVENOUS at 22:36

## 2022-11-12 RX ADMIN — ASPIRIN 325 MG: 325 TABLET, COATED ORAL at 08:42

## 2022-11-12 RX ADMIN — ACETAMINOPHEN 650 MG: 325 TABLET ORAL at 16:07

## 2022-11-12 RX ADMIN — Medication 1 TABLET: at 08:42

## 2022-11-12 ASSESSMENT — PAIN DESCRIPTION - FREQUENCY
FREQUENCY: INTERMITTENT
FREQUENCY: INTERMITTENT

## 2022-11-12 ASSESSMENT — PAIN DESCRIPTION - DESCRIPTORS
DESCRIPTORS: ACHING;CRAMPING
DESCRIPTORS: ACHING;CRAMPING

## 2022-11-12 ASSESSMENT — PAIN SCALES - GENERAL
PAINLEVEL_OUTOF10: 7
PAINLEVEL_OUTOF10: 7
PAINLEVEL_OUTOF10: 5
PAINLEVEL_OUTOF10: 6
PAINLEVEL_OUTOF10: 0

## 2022-11-12 ASSESSMENT — PAIN - FUNCTIONAL ASSESSMENT
PAIN_FUNCTIONAL_ASSESSMENT: PREVENTS OR INTERFERES SOME ACTIVE ACTIVITIES AND ADLS
PAIN_FUNCTIONAL_ASSESSMENT: PREVENTS OR INTERFERES SOME ACTIVE ACTIVITIES AND ADLS

## 2022-11-12 ASSESSMENT — PAIN DESCRIPTION - LOCATION
LOCATION: BACK
LOCATION: BACK

## 2022-11-12 ASSESSMENT — PAIN DESCRIPTION - ORIENTATION
ORIENTATION: MID
ORIENTATION: MID

## 2022-11-12 ASSESSMENT — PAIN DESCRIPTION - ONSET
ONSET: ON-GOING
ONSET: ON-GOING

## 2022-11-12 ASSESSMENT — PAIN DESCRIPTION - PAIN TYPE
TYPE: ACUTE PAIN
TYPE: ACUTE PAIN

## 2022-11-12 NOTE — PROCEDURES
ICU PROCEDURE - CVC PLACEMENT:    Risks and benefits to the procedure were discussed. Alternatives and their risks were discussed as well. INDICATION: CABG, access     SITE: Left Subclavian Vein      CONSENT: was obtained after explaining indication/risks to patient and/or next of kin    TIME OUT: taken    STERILE PREP: Prior to the procedure all involved washed their hands. Full maximum sterile field/barrier technique was followed (with cap and mask, gloves and sterile gown, as well as broad field sterile drapes). Local disinfection was performed with broad field application of orange dyed chloraprep solution, which was allowed to dry fully prior to the initiation of the procedure. LOCAL ANESTHETIC: Aqueous lidocaine 1%     ESTIMATED BLOOD LOSS: Minimal    ULTRASOUND GUIDANCE USED: No    PROCEDURE:  Using modified seldinger technique, the appropriate vessel was located with the introducer needle subsequent to the use of a 22g x 1.5 inch \"\" needle. The flexible J-tip wire was passed without difficulty or resistance, followed by minimal skin incisions over the introducer needle. The introducer needle was withdrawn and discarded, maintaining manual control of the guidewire at all times. After dilation of the tract, a preflushed 3 lumen CVC catheter was advanced over the guidewire without difficulty or resistance to its full extent. The guidewire was withdrawn and discarded and good return of nonpulsatile, dark venous blood assured through all lumes, with easy flushing of the lumens. The line was sutured in place then the site was reprepped with a  chlorprep solution followed by a Biopatch device. After hemostasis was assured, an op site was applied and all sharps and materials were discarded appropriately. EBL < 5 ml. COMPLICATIONS: None    POST PROCEDURE CHEST XRAY HAS BEEN ORDERED    Electronically signed by     Toñito Burger CNP on 11/12/2022 at 11:53 AM

## 2022-11-12 NOTE — PROGRESS NOTES
Postop day #5  A lot more lucid today, delirium is clearing  Hunter replaced because of urinary retention  Constipated and mild cathartics not working  Requiring a fair amount of narcotics for pain control, he has a productive cough and requires the pain meds which are likely worsening his urinary retention and constipation, and necessary trade off  On amiodarone to be used 1 cordis, hope to place central line and continue amiodarone drip  Consider heparin once central line placed for A.  Fib  Patient was vaginal basal plegic for quite a while but now off epinephrine and maintaining hemodynamics; continue to monitor in ICU

## 2022-11-12 NOTE — PROGRESS NOTES
[address] [facility] PHYSICAL THERAPY  DAILY NOTE  [facility] ICU 4D - 4D-011-A    Time In: 1200  Time Out: 1225  Timed Code Treatment Minutes: 25 Minutes  Minutes: 25          Date: 2022  Patient Name: Zari Gill,  Gender:  male        MRN: [id number]  : 1945  (78 y.o.)     Referring Practitioner: Fidencio Gomes MD  Diagnosis: Abnormal findings on cardiac catheterization  Additional Pertinent Hx: 78 y.o. male, s/p multiple PCI to LAD and transapical diagonal, originally presenting with exertional left parasternal, non-radiating chest pain relieved by rest, along with chronic progressive fatigue, underwent attempted PCI today. Procedure terminated with malaligned stent in LAD. Patient symptomatic with unstable angina after the procedure. Pt is s/p emergent CABG x 2 . Prior Level of Function:  Lives With: Spouse  Type of Home: House  Home Layout: One level  Home Access: Stairs to enter without rails  Entrance Stairs - Number of Steps: 1 small threshold  Home Equipment: Rollator, Cane   Bathroom Shower/Tub: Tub/Shower unit (Pt reports he likes to get down into tub)  Bathroom Toilet: Standard    ADL Assistance: Needs assistance (occassional A-Pt unable to clarify with what he needed A for)  Homemaking Assistance: Needs assistance  Ambulation Assistance: Independent  Transfer Assistance: Independent  Active : Yes  Additional Comments: Per PT eval, Pt amb with SC in home, rollator in community    Restrictions/Precautions:  Restrictions/Precautions: Surgical Protocols, Fall Risk  Position Activity Restriction  Sternal Precautions: No Pushing, No Pulling, 10# Lifting Restrictions  Other position/activity restrictions: s/p CABG x 2 on 2022     SUBJECTIVE: first attempt, pt requested to rest a little longer and agreed to PT return to get OOB to chair for lunch. Second attempt, pt agreeable with min encouragement. Wife present and supportive.     PAIN: no c/o pain during session    Vitals: Vitals not assessed per clinical judgement, see nursing flowsheet    OBJECTIVE:  Bed Mobility:  Rolling to Left: Moderate Assistance   Supine to Sit: Moderate Assistance  Scooting: Moderate Assistance  HOB Up 30 degrees, cues to hug bear  Transfers:  Sit to Stand: Minimal Assistance  [facility], BUE HHA WBOS< fwd flexed posture  Ambulation:  Minimal Assistance, X 1  Distance: 4'x1  Surface: Level Tile  Device:Hand-Held Assist  Gait Deviations: Forward Flexed Posture, Slow Lin, Decreased Step Length Bilaterally, Wide Base of Support, and Unsteady Gait    Balance:  Static Sitting Balance:  Stand By Assistance, inc time EOB due to pt min lightheaded initially  Static Standing Balance: Minimal Assistance, X 1, hugging heart bear, tolerated around 1 min before and 1 min after amb, WBOS, fwd flexed posture, fatigued at end        Functional Outcome Measures: Completed  AM-PAC Inpatient Mobility without Stair Climbing Raw Score : 12  AM-PAC Inpatient without Stair Climbing T-Scale Score : 37.26    ASSESSMENT:  Assessment: Patient progressing toward established goals. and slow transitions with cues for safety with mobility, 1 assist for safe mobility. Activity Tolerance:  Patient tolerance of  treatment: fair.       Equipment Recommendations:Equipment Needed: No  Discharge Recommendations: Continue to assess pending progress, Patient would benefit from continued PT at discharge, and Inpatient Therapy Stay  Plan: Current Treatment Recommendations: Strengthening, Balance training, Functional mobility training, Transfer training, Endurance training, Neuromuscular re-education, Gait training, Stair training, Patient/Caregiver education & training, Therapeutic activities, Home exercise program, Safety education & training  General Plan:  (6x CABG)    Patient Education  Patient Education: Bed Mobility, Transfers, Gait    Goals:  Patient Goals : to go home  Short Term Goals  Time Frame for Short Term Goals: by discharge  Short Term Goal 1: Pt to transfer supine <--> sit SBA to enable pt to get in/out of bed. Short Term Goal 2: Pt to transfer sit <--> stand SBA for increased functional mobility. Short Term Goal 3: Pt to ambulate >200 feet with LRAD SBA for household ambulation. Short Term Goal 4: Pt to ascend/descend 1 step with AD CGA for home entry. Long Term Goals  Time Frame for Long Term Goals : NA due to short length of stay. Following session, patient left in safe position with all fall risk precautions in place.

## 2022-11-13 ENCOUNTER — APPOINTMENT (OUTPATIENT)
Dept: GENERAL RADIOLOGY | Age: 77
DRG: 235 | End: 2022-11-13
Attending: INTERNAL MEDICINE
Payer: MEDICARE

## 2022-11-13 LAB
ANION GAP SERPL CALCULATED.3IONS-SCNC: 11 MEQ/L (ref 8–16)
BUN BLDV-MCNC: 25 MG/DL (ref 7–22)
CALCIUM SERPL-MCNC: 8.6 MG/DL (ref 8.5–10.5)
CHLORIDE BLD-SCNC: 104 MEQ/L (ref 98–111)
CO2: 26 MEQ/L (ref 23–33)
CREAT SERPL-MCNC: 1 MG/DL (ref 0.4–1.2)
EKG Q-T INTERVAL: 394 MS
EKG QRS DURATION: 92 MS
EKG QTC CALCULATION (BAZETT): 471 MS
EKG R AXIS: 17 DEGREES
EKG T AXIS: 25 DEGREES
EKG VENTRICULAR RATE: 86 BPM
ERYTHROCYTE [DISTWIDTH] IN BLOOD BY AUTOMATED COUNT: 14.5 % (ref 11.5–14.5)
ERYTHROCYTE [DISTWIDTH] IN BLOOD BY AUTOMATED COUNT: 51 FL (ref 35–45)
GFR SERPL CREATININE-BSD FRML MDRD: > 60 ML/MIN/1.73M2
GLUCOSE BLD-MCNC: 109 MG/DL (ref 70–108)
GLUCOSE BLD-MCNC: 130 MG/DL (ref 70–108)
GLUCOSE BLD-MCNC: 131 MG/DL (ref 70–108)
GLUCOSE BLD-MCNC: 99 MG/DL (ref 70–108)
HCT VFR BLD CALC: 29 % (ref 42–52)
HEMOGLOBIN: 9.6 GM/DL (ref 14–18)
HEPARIN UNFRACTIONATED: 0.07 U/ML (ref 0.3–0.7)
HEPARIN UNFRACTIONATED: 0.32 U/ML (ref 0.3–0.7)
HEPARIN UNFRACTIONATED: 0.34 U/ML (ref 0.3–0.7)
MCH RBC QN AUTO: 32.2 PG (ref 26–33)
MCHC RBC AUTO-ENTMCNC: 33.1 GM/DL (ref 32.2–35.5)
MCV RBC AUTO: 97.3 FL (ref 80–94)
PLATELET # BLD: 249 THOU/MM3 (ref 130–400)
PMV BLD AUTO: 10.6 FL (ref 9.4–12.4)
POTASSIUM SERPL-SCNC: 3.6 MEQ/L (ref 3.5–5.2)
RBC # BLD: 2.98 MILL/MM3 (ref 4.7–6.1)
SODIUM BLD-SCNC: 141 MEQ/L (ref 135–145)
WBC # BLD: 12.2 THOU/MM3 (ref 4.8–10.8)

## 2022-11-13 PROCEDURE — 6370000000 HC RX 637 (ALT 250 FOR IP): Performed by: THORACIC SURGERY (CARDIOTHORACIC VASCULAR SURGERY)

## 2022-11-13 PROCEDURE — 93005 ELECTROCARDIOGRAM TRACING: CPT | Performed by: THORACIC SURGERY (CARDIOTHORACIC VASCULAR SURGERY)

## 2022-11-13 PROCEDURE — 2500000003 HC RX 250 WO HCPCS: Performed by: THORACIC SURGERY (CARDIOTHORACIC VASCULAR SURGERY)

## 2022-11-13 PROCEDURE — 85520 HEPARIN ASSAY: CPT

## 2022-11-13 PROCEDURE — 2060000000 HC ICU INTERMEDIATE R&B

## 2022-11-13 PROCEDURE — 6360000002 HC RX W HCPCS: Performed by: THORACIC SURGERY (CARDIOTHORACIC VASCULAR SURGERY)

## 2022-11-13 PROCEDURE — 2580000003 HC RX 258: Performed by: THORACIC SURGERY (CARDIOTHORACIC VASCULAR SURGERY)

## 2022-11-13 PROCEDURE — 80048 BASIC METABOLIC PNL TOTAL CA: CPT

## 2022-11-13 PROCEDURE — 71045 X-RAY EXAM CHEST 1 VIEW: CPT

## 2022-11-13 PROCEDURE — 97110 THERAPEUTIC EXERCISES: CPT

## 2022-11-13 PROCEDURE — 85027 COMPLETE CBC AUTOMATED: CPT

## 2022-11-13 PROCEDURE — 82948 REAGENT STRIP/BLOOD GLUCOSE: CPT

## 2022-11-13 PROCEDURE — 36415 COLL VENOUS BLD VENIPUNCTURE: CPT

## 2022-11-13 PROCEDURE — 93010 ELECTROCARDIOGRAM REPORT: CPT | Performed by: NUCLEAR MEDICINE

## 2022-11-13 RX ORDER — POTASSIUM CHLORIDE 20 MEQ/1
20 TABLET, EXTENDED RELEASE ORAL
Status: DISCONTINUED | OUTPATIENT
Start: 2022-11-13 | End: 2022-11-14

## 2022-11-13 RX ORDER — AMIODARONE HYDROCHLORIDE 200 MG/1
200 TABLET ORAL 2 TIMES DAILY
Status: DISCONTINUED | OUTPATIENT
Start: 2022-11-13 | End: 2022-11-15 | Stop reason: HOSPADM

## 2022-11-13 RX ORDER — MIDODRINE HYDROCHLORIDE 5 MG/1
5 TABLET ORAL
Status: DISCONTINUED | OUTPATIENT
Start: 2022-11-13 | End: 2022-11-14

## 2022-11-13 RX ADMIN — SENNOSIDES AND DOCUSATE SODIUM 1 TABLET: 50; 8.6 TABLET ORAL at 08:03

## 2022-11-13 RX ADMIN — MIDODRINE HYDROCHLORIDE 10 MG: 10 TABLET ORAL at 08:03

## 2022-11-13 RX ADMIN — INSULIN GLARGINE 20 UNITS: 100 INJECTION, SOLUTION SUBCUTANEOUS at 20:02

## 2022-11-13 RX ADMIN — SODIUM CHLORIDE, PRESERVATIVE FREE 10 ML: 5 INJECTION INTRAVENOUS at 19:51

## 2022-11-13 RX ADMIN — SENNOSIDES AND DOCUSATE SODIUM 1 TABLET: 50; 8.6 TABLET ORAL at 20:01

## 2022-11-13 RX ADMIN — FUROSEMIDE 20 MG: 10 INJECTION, SOLUTION INTRAMUSCULAR; INTRAVENOUS at 17:58

## 2022-11-13 RX ADMIN — MIDODRINE HYDROCHLORIDE 5 MG: 5 TABLET ORAL at 17:58

## 2022-11-13 RX ADMIN — POTASSIUM CHLORIDE 20 MEQ: 1500 TABLET, EXTENDED RELEASE ORAL at 08:03

## 2022-11-13 RX ADMIN — INSULIN GLARGINE 20 UNITS: 100 INJECTION, SOLUTION SUBCUTANEOUS at 08:06

## 2022-11-13 RX ADMIN — FAMOTIDINE 20 MG: 20 TABLET ORAL at 19:48

## 2022-11-13 RX ADMIN — AMIODARONE HYDROCHLORIDE 200 MG: 200 TABLET ORAL at 10:43

## 2022-11-13 RX ADMIN — POTASSIUM CHLORIDE 20 MEQ: 1500 TABLET, EXTENDED RELEASE ORAL at 17:59

## 2022-11-13 RX ADMIN — ATORVASTATIN CALCIUM 20 MG: 20 TABLET, FILM COATED ORAL at 19:48

## 2022-11-13 RX ADMIN — ASPIRIN 325 MG: 325 TABLET, COATED ORAL at 08:03

## 2022-11-13 RX ADMIN — POTASSIUM CHLORIDE 20 MEQ: 1500 TABLET, EXTENDED RELEASE ORAL at 12:44

## 2022-11-13 RX ADMIN — Medication 1 TABLET: at 08:03

## 2022-11-13 RX ADMIN — AMIODARONE HYDROCHLORIDE 0.5 MG/MIN: 1.8 INJECTION, SOLUTION INTRAVENOUS at 03:57

## 2022-11-13 RX ADMIN — OXYCODONE 10 MG: 5 TABLET ORAL at 03:54

## 2022-11-13 RX ADMIN — HEPARIN SODIUM 16.97 UNITS/KG/HR: 10000 INJECTION, SOLUTION INTRAVENOUS at 13:08

## 2022-11-13 RX ADMIN — OXYCODONE 10 MG: 5 TABLET ORAL at 19:48

## 2022-11-13 RX ADMIN — SODIUM CHLORIDE, PRESERVATIVE FREE 10 ML: 5 INJECTION INTRAVENOUS at 08:04

## 2022-11-13 RX ADMIN — AMIODARONE HYDROCHLORIDE 200 MG: 200 TABLET ORAL at 19:48

## 2022-11-13 RX ADMIN — FAMOTIDINE 20 MG: 20 TABLET ORAL at 08:03

## 2022-11-13 RX ADMIN — TAMSULOSIN HYDROCHLORIDE 0.4 MG: 0.4 CAPSULE ORAL at 08:03

## 2022-11-13 RX ADMIN — MIDODRINE HYDROCHLORIDE 5 MG: 5 TABLET ORAL at 12:44

## 2022-11-13 RX ADMIN — FUROSEMIDE 20 MG: 10 INJECTION, SOLUTION INTRAMUSCULAR; INTRAVENOUS at 08:03

## 2022-11-13 ASSESSMENT — PAIN DESCRIPTION - DESCRIPTORS
DESCRIPTORS: ACHING;CRAMPING
DESCRIPTORS: ACHING

## 2022-11-13 ASSESSMENT — PAIN DESCRIPTION - PAIN TYPE
TYPE: CHRONIC PAIN

## 2022-11-13 ASSESSMENT — PAIN SCALES - GENERAL
PAINLEVEL_OUTOF10: 6
PAINLEVEL_OUTOF10: 6
PAINLEVEL_OUTOF10: 7
PAINLEVEL_OUTOF10: 3
PAINLEVEL_OUTOF10: 7
PAINLEVEL_OUTOF10: 7

## 2022-11-13 ASSESSMENT — PAIN - FUNCTIONAL ASSESSMENT
PAIN_FUNCTIONAL_ASSESSMENT: PREVENTS OR INTERFERES SOME ACTIVE ACTIVITIES AND ADLS

## 2022-11-13 ASSESSMENT — PAIN DESCRIPTION - ORIENTATION
ORIENTATION: MID
ORIENTATION: OUTER
ORIENTATION: MID;LOWER
ORIENTATION: MID

## 2022-11-13 ASSESSMENT — PAIN DESCRIPTION - FREQUENCY
FREQUENCY: CONTINUOUS
FREQUENCY: CONTINUOUS
FREQUENCY: INTERMITTENT
FREQUENCY: INTERMITTENT

## 2022-11-13 ASSESSMENT — PAIN DESCRIPTION - ONSET
ONSET: ON-GOING

## 2022-11-13 ASSESSMENT — PAIN DESCRIPTION - LOCATION
LOCATION: BACK;BUTTOCKS
LOCATION: BACK

## 2022-11-13 NOTE — PROGRESS NOTES
Pt admitted to  4K16 via cart/stretcher from ICU  Complaints: Status post angioplasty with stent. .    IV amiodarone and heparin infusing into the subclavian left, condition patent and no redness. IV site free of s/s of infection or infiltration. Vital signs obtained. Assessment and data collection initiated. Oriented to room. Policies and procedures for 4K explained. All questions answered with no further questions at this time. Fall prevention and safety brochure discussed with patient. Bed alarm on. Call light in reach.

## 2022-11-13 NOTE — PROGRESS NOTES
[facility] ICU STEPDOWN TELEMETRY 4K  Occupational Therapy  Daily Note  Time:    Time In: 08  Time Out: 0840  Timed Code Treatment Minutes: 27 Minutes  Minutes: 27          Date: 2022  Patient Name: Enrico Britt,   Gender: male      Room: [facility]16/016-A  MRN: [id number]  : 1945  (78 y.o.)  Referring Practitioner: Dr. Cruz Argueta  Diagnosis: abnormal findings on cardiac catheterization  Additional Pertinent Hx: Pt presented for scheduled heart cath on 2022. Per Cardio sx note: 78 y.o. male, s/p multiple PCI to LAD and transapical diagonal, originally presenting with exertional left parasternal, non-radiating chest pain relieved by rest, along with chronic progressive fatigue, underwent attempted PCI today. Procedure terminated with malaligned stent in LAD. Patient symptomatic with unstable angina after the procedure. s/p emergent CABG x 2 on 2022. Restrictions/Precautions:  Restrictions/Precautions: Surgical Protocols, Fall Risk  Position Activity Restriction  Sternal Precautions: No Pushing, No Pulling, 5# Lifting Restrictions  Other position/activity restrictions: s/p CABG x 2 on 2022      SUBJECTIVE: Pt up in recliner upon arrival of therapist, eyes closed, very slow to respond & poorly engaged in session     PAIN: reports not too bad/10: in incision    Vitals: Blood Pressure: 141/82  Oxygen: 98% on room air  Heart Rate: 111    COGNITION: Slow Processing, Inattention, Decreased Problem Solving, and Decreased Safety Awareness    ADL:   Lower Extremity Dressing: Dependent. For adjusting slipper socks . BALANCE:  Standing Balance: Contact Guard Assistance. BED MOBILITY:  Not Tested    TRANSFERS:  Sit to Stand:  Minimal Assistance. From recliner; vcs for technique/sternal precautions  Stand to Sit: [employer]. To recliner vcs for technique    FUNCTIONAL MOBILITY:  Assistive Device: Rolling Walker  Assist Level:  Minimal Assistance. Distance:  2 steps forward & back x 2 episodes  Unsteadiness noted     ADDITIONAL ACTIVITIES:  Completed step 2 cardiac exercises x 10 reps with max vcs to complete reps & attend to task    ASSESSMENT:     Activity Tolerance:  Patient tolerance of  treatment: fair. Discharge Recommendations: Continue to assess pending progress  Equipment Recommendations: Other: Monitor pending progress; likely need BSC  Plan: Times Per Week: 6x  Current Treatment Recommendations: Balance training, Self-Care / ADL, Equipment evaluation, education, & procurement, Functional mobility training, Endurance training, Safety education & training    Patient Education  Patient Education:  see above    Goals  Short Term Goals  Time Frame for Short Term Goals: until discharge  Short Term Goal 1: Pt will complete various sit-stand t/fs with min A x 1 & min vcs for sternal precautions  Short Term Goal 2: Pt will tolerate standing 4-5 min with CGA for increased ease of sinkside grooming  Short Term Goal 3: Pt will complete mobility to bedside chair or BSC with RW, CGA, & 0-2 vcs for safety  Short Term Goal 4: Pt will complete LE dressing with min A & adaptive strategies prn  Short Term Goal 5: Pt will tolerate BUE step 2 cardiac exercises x 10 reps with min RBs to increase endurance for BADL  Long Term Goals  Time Frame for Long Term Goals : No LTG set d/t short ELOS    Following session, patient left in safe position with all fall risk precautions in place.

## 2022-11-13 NOTE — FLOWSHEET NOTE
11/13/22 1225   Safe Environment   Safety Measures Standard Safety Measures; Other (comment)  (Virtual Safety Check)   Patient sitting up in chair. Respirations even, unlabored. Call light in reach. No safety concerns at this time.

## 2022-11-13 NOTE — PROGRESS NOTES
Postop day #6 status post CABG  Joking around with the nurses and staff and in good spirits  Had a bowel movement earlier today  Remains on amiodarone drip  Patient started on heparin drip after central line was placed  Off pressors  Keep Hunter and chest tubes

## 2022-11-13 NOTE — PLAN OF CARE
Problem: Discharge Planning  Goal: Discharge to home or other facility with appropriate resources  Outcome: Progressing  Flowsheets (Taken 11/12/2022 2039)  Discharge to home or other facility with appropriate resources:   Identify barriers to discharge with patient and caregiver   Identify discharge learning needs (meds, wound care, etc)     Problem: Safety - Adult  Goal: Free from fall injury  Outcome: Progressing  Flowsheets (Taken 11/13/2022 0218)  Free From Fall Injury: Instruct family/caregiver on patient safety     Problem: Neurosensory - Adult  Goal: Achieves maximal functionality and self care  Outcome: Progressing  Flowsheets  Taken 11/13/2022 0218  Achieves maximal functionality and self care: Monitor swallowing and airway patency with patient fatigue and changes in neurological status  Taken 11/12/2022 2039  Achieves maximal functionality and self care: Monitor swallowing and airway patency with patient fatigue and changes in neurological status     Problem: Respiratory - Adult  Goal: Achieves optimal ventilation and oxygenation  Outcome: Progressing  Flowsheets (Taken 11/12/2022 2039)  Achieves optimal ventilation and oxygenation:   Assess for changes in respiratory status   Assess for changes in mentation and behavior   Position to facilitate oxygenation and minimize respiratory effort   Assess and instruct to report shortness of breath or any respiratory difficulty     Problem: Cardiovascular - Adult  Goal: Absence of cardiac dysrhythmias or at baseline  Outcome: Progressing  Flowsheets (Taken 11/12/2022 2039)  Absence of cardiac dysrhythmias or at baseline:   Monitor cardiac rate and rhythm   Assess for signs of decreased cardiac output     Problem: Skin/Tissue Integrity - Adult  Goal: Incisions, wounds, or drain sites healing without S/S of infection  Outcome: Progressing  Flowsheets  Taken 11/13/2022 0213  Incisions, Wounds, or Drain Sites Healing Without Sign and Symptoms of Infection: ADMISSION and DAILY: Assess and document risk factors for pressure ulcer development  Taken 11/12/2022 2039  Incisions, Wounds, or Drain Sites Healing Without Sign and Symptoms of Infection: ADMISSION and DAILY: Assess and document risk factors for pressure ulcer development     Problem: Gastrointestinal - Adult  Goal: Maintains adequate nutritional intake  Outcome: Progressing  Flowsheets (Taken 11/12/2022 2039)  Maintains adequate nutritional intake:   Monitor percentage of each meal consumed   Assist with meals as needed   Monitor intake and output, weight and lab values   Identify factors contributing to decreased intake, treat as appropriate     Problem: Genitourinary - Adult  Goal: Absence of urinary retention  Outcome: Progressing  Flowsheets (Taken 11/12/2022 2039)  Absence of urinary retention:   Assess patients ability to void and empty bladder   Monitor intake/output and perform bladder scan as needed  Goal: Urinary catheter remains patent  Outcome: Progressing  Flowsheets (Taken 11/13/2022 0219)  Urinary catheter remains patent: Assess patency of urinary catheter     Problem: Infection - Adult  Goal: Absence of infection during hospitalization  Outcome: Progressing  Flowsheets (Taken 11/12/2022 2039)  Absence of infection during hospitalization:   Assess and monitor for signs and symptoms of infection   Monitor lab/diagnostic results   Monitor all insertion sites i.e., indwelling lines, tubes and drains   Administer medications as ordered   Instruct and encourage patient and family to use good hand hygiene technique     Problem: Pain  Goal: Verbalizes/displays adequate comfort level or baseline comfort level  Outcome: Progressing  Flowsheets (Taken 11/12/2022 2039)  Verbalizes/displays adequate comfort level or baseline comfort level:   Encourage patient to monitor pain and request assistance   Assess pain using appropriate pain scale   Administer analgesics based on type and severity of pain and evaluate response   Implement non-pharmacological measures as appropriate and evaluate response   Consider cultural and social influences on pain and pain management     Problem: Nutrition Deficit:  Goal: Optimize nutritional status  Outcome: Progressing  Flowsheets (Taken 11/13/2022 0218)  Nutrient intake appropriate for improving, restoring, or maintaining nutritional needs: Monitor oral intake, labs, and treatment plans     Problem: Skin/Tissue Integrity  Goal: Absence of new skin breakdown  Description: 1. Monitor for areas of redness and/or skin breakdown  2. Assess vascular access sites hourly  3. Every 4-6 hours minimum:  Change oxygen saturation probe site  4. Every 4-6 hours:  If on nasal continuous positive airway pressure, respiratory therapy assess nares and determine need for appliance change or resting period. Outcome: Progressing     Care plan reviewed with patient. Patient verbalized understanding of the plan of care and contributed to goal setting.

## 2022-11-14 ENCOUNTER — APPOINTMENT (OUTPATIENT)
Dept: GENERAL RADIOLOGY | Age: 77
DRG: 235 | End: 2022-11-14
Attending: INTERNAL MEDICINE
Payer: MEDICARE

## 2022-11-14 LAB
ANION GAP SERPL CALCULATED.3IONS-SCNC: 11 MEQ/L (ref 8–16)
BUN BLDV-MCNC: 22 MG/DL (ref 7–22)
CALCIUM SERPL-MCNC: 8.2 MG/DL (ref 8.5–10.5)
CHLORIDE BLD-SCNC: 104 MEQ/L (ref 98–111)
CO2: 25 MEQ/L (ref 23–33)
CREAT SERPL-MCNC: 1 MG/DL (ref 0.4–1.2)
EKG ATRIAL RATE: 76 BPM
EKG P AXIS: 52 DEGREES
EKG P-R INTERVAL: 194 MS
EKG Q-T INTERVAL: 414 MS
EKG QRS DURATION: 92 MS
EKG QTC CALCULATION (BAZETT): 465 MS
EKG R AXIS: 30 DEGREES
EKG T AXIS: 55 DEGREES
EKG VENTRICULAR RATE: 76 BPM
ERYTHROCYTE [DISTWIDTH] IN BLOOD BY AUTOMATED COUNT: 14.4 % (ref 11.5–14.5)
ERYTHROCYTE [DISTWIDTH] IN BLOOD BY AUTOMATED COUNT: 49.5 FL (ref 35–45)
GFR SERPL CREATININE-BSD FRML MDRD: > 60 ML/MIN/1.73M2
GLUCOSE BLD-MCNC: 100 MG/DL (ref 70–108)
GLUCOSE BLD-MCNC: 106 MG/DL (ref 70–108)
GLUCOSE BLD-MCNC: 116 MG/DL (ref 70–108)
GLUCOSE BLD-MCNC: 126 MG/DL (ref 70–108)
GLUCOSE BLD-MCNC: 81 MG/DL (ref 70–108)
GLUCOSE BLD-MCNC: 92 MG/DL (ref 70–108)
HCT VFR BLD CALC: 25.4 % (ref 42–52)
HEMOGLOBIN: 8.4 GM/DL (ref 14–18)
HEPARIN UNFRACTIONATED: 0.4 U/ML (ref 0.3–0.7)
MCH RBC QN AUTO: 31.9 PG (ref 26–33)
MCHC RBC AUTO-ENTMCNC: 33.1 GM/DL (ref 32.2–35.5)
MCV RBC AUTO: 96.6 FL (ref 80–94)
PLATELET # BLD: 215 THOU/MM3 (ref 130–400)
PMV BLD AUTO: 10.3 FL (ref 9.4–12.4)
POTASSIUM REFLEX MAGNESIUM: 4.1 MEQ/L (ref 3.5–5.2)
POTASSIUM SERPL-SCNC: 3.4 MEQ/L (ref 3.5–5.2)
RBC # BLD: 2.63 MILL/MM3 (ref 4.7–6.1)
REASON FOR REJECTION: NORMAL
REJECTED TEST: NORMAL
SODIUM BLD-SCNC: 140 MEQ/L (ref 135–145)
WBC # BLD: 11.3 THOU/MM3 (ref 4.8–10.8)

## 2022-11-14 PROCEDURE — 93005 ELECTROCARDIOGRAM TRACING: CPT | Performed by: PHYSICIAN ASSISTANT

## 2022-11-14 PROCEDURE — 6360000002 HC RX W HCPCS: Performed by: THORACIC SURGERY (CARDIOTHORACIC VASCULAR SURGERY)

## 2022-11-14 PROCEDURE — 2060000000 HC ICU INTERMEDIATE R&B

## 2022-11-14 PROCEDURE — 80048 BASIC METABOLIC PNL TOTAL CA: CPT

## 2022-11-14 PROCEDURE — 97535 SELF CARE MNGMENT TRAINING: CPT

## 2022-11-14 PROCEDURE — 6360000002 HC RX W HCPCS: Performed by: PHYSICIAN ASSISTANT

## 2022-11-14 PROCEDURE — 2580000003 HC RX 258: Performed by: THORACIC SURGERY (CARDIOTHORACIC VASCULAR SURGERY)

## 2022-11-14 PROCEDURE — 6370000000 HC RX 637 (ALT 250 FOR IP): Performed by: THORACIC SURGERY (CARDIOTHORACIC VASCULAR SURGERY)

## 2022-11-14 PROCEDURE — 71045 X-RAY EXAM CHEST 1 VIEW: CPT

## 2022-11-14 PROCEDURE — 84132 ASSAY OF SERUM POTASSIUM: CPT

## 2022-11-14 PROCEDURE — 97110 THERAPEUTIC EXERCISES: CPT

## 2022-11-14 PROCEDURE — 85027 COMPLETE CBC AUTOMATED: CPT

## 2022-11-14 PROCEDURE — 93010 ELECTROCARDIOGRAM REPORT: CPT | Performed by: INTERNAL MEDICINE

## 2022-11-14 PROCEDURE — 36415 COLL VENOUS BLD VENIPUNCTURE: CPT

## 2022-11-14 PROCEDURE — APPSS30 APP SPLIT SHARED TIME 16-30 MINUTES: Performed by: PHYSICIAN ASSISTANT

## 2022-11-14 PROCEDURE — 85520 HEPARIN ASSAY: CPT

## 2022-11-14 PROCEDURE — 97116 GAIT TRAINING THERAPY: CPT

## 2022-11-14 PROCEDURE — 82948 REAGENT STRIP/BLOOD GLUCOSE: CPT

## 2022-11-14 RX ORDER — ENOXAPARIN SODIUM 100 MG/ML
30 INJECTION SUBCUTANEOUS 2 TIMES DAILY
Status: DISCONTINUED | OUTPATIENT
Start: 2022-11-14 | End: 2022-11-15 | Stop reason: HOSPADM

## 2022-11-14 RX ORDER — POTASSIUM CHLORIDE 20 MEQ/1
40 TABLET, EXTENDED RELEASE ORAL
Status: DISCONTINUED | OUTPATIENT
Start: 2022-11-14 | End: 2022-11-15 | Stop reason: HOSPADM

## 2022-11-14 RX ADMIN — SODIUM CHLORIDE, PRESERVATIVE FREE 10 ML: 5 INJECTION INTRAVENOUS at 21:09

## 2022-11-14 RX ADMIN — METOPROLOL TARTRATE 25 MG: 25 TABLET, FILM COATED ORAL at 11:30

## 2022-11-14 RX ADMIN — SODIUM CHLORIDE, PRESERVATIVE FREE 10 ML: 5 INJECTION INTRAVENOUS at 09:17

## 2022-11-14 RX ADMIN — FAMOTIDINE 20 MG: 20 TABLET ORAL at 21:08

## 2022-11-14 RX ADMIN — POTASSIUM CHLORIDE 40 MEQ: 1500 TABLET, EXTENDED RELEASE ORAL at 11:30

## 2022-11-14 RX ADMIN — OXYCODONE 5 MG: 5 TABLET ORAL at 03:11

## 2022-11-14 RX ADMIN — POTASSIUM CHLORIDE 40 MEQ: 1500 TABLET, EXTENDED RELEASE ORAL at 17:02

## 2022-11-14 RX ADMIN — ENOXAPARIN SODIUM 30 MG: 100 INJECTION SUBCUTANEOUS at 21:08

## 2022-11-14 RX ADMIN — OXYCODONE 10 MG: 5 TABLET ORAL at 14:27

## 2022-11-14 RX ADMIN — METOPROLOL TARTRATE 25 MG: 25 TABLET, FILM COATED ORAL at 21:08

## 2022-11-14 RX ADMIN — POTASSIUM CHLORIDE 20 MEQ: 1500 TABLET, EXTENDED RELEASE ORAL at 09:03

## 2022-11-14 RX ADMIN — ACETAMINOPHEN 650 MG: 325 TABLET ORAL at 22:46

## 2022-11-14 RX ADMIN — AMIODARONE HYDROCHLORIDE 200 MG: 200 TABLET ORAL at 09:03

## 2022-11-14 RX ADMIN — Medication 1 TABLET: at 09:03

## 2022-11-14 RX ADMIN — ASPIRIN 325 MG: 325 TABLET, COATED ORAL at 09:03

## 2022-11-14 RX ADMIN — INSULIN GLARGINE 20 UNITS: 100 INJECTION, SOLUTION SUBCUTANEOUS at 09:18

## 2022-11-14 RX ADMIN — SENNOSIDES AND DOCUSATE SODIUM 1 TABLET: 50; 8.6 TABLET ORAL at 21:08

## 2022-11-14 RX ADMIN — FAMOTIDINE 20 MG: 20 TABLET ORAL at 09:04

## 2022-11-14 RX ADMIN — TAMSULOSIN HYDROCHLORIDE 0.4 MG: 0.4 CAPSULE ORAL at 09:03

## 2022-11-14 RX ADMIN — SENNOSIDES AND DOCUSATE SODIUM 1 TABLET: 50; 8.6 TABLET ORAL at 09:04

## 2022-11-14 RX ADMIN — POTASSIUM CHLORIDE 20 MEQ: 29.8 INJECTION, SOLUTION INTRAVENOUS at 06:05

## 2022-11-14 RX ADMIN — FUROSEMIDE 20 MG: 10 INJECTION, SOLUTION INTRAMUSCULAR; INTRAVENOUS at 17:02

## 2022-11-14 RX ADMIN — AMIODARONE HYDROCHLORIDE 200 MG: 200 TABLET ORAL at 21:08

## 2022-11-14 RX ADMIN — FUROSEMIDE 20 MG: 10 INJECTION, SOLUTION INTRAMUSCULAR; INTRAVENOUS at 09:04

## 2022-11-14 RX ADMIN — POTASSIUM CHLORIDE 20 MEQ: 29.8 INJECTION, SOLUTION INTRAVENOUS at 07:11

## 2022-11-14 RX ADMIN — HEPARIN SODIUM 16.97 UNITS/KG/HR: 10000 INJECTION, SOLUTION INTRAVENOUS at 03:11

## 2022-11-14 RX ADMIN — INSULIN GLARGINE 20 UNITS: 100 INJECTION, SOLUTION SUBCUTANEOUS at 22:47

## 2022-11-14 RX ADMIN — ATORVASTATIN CALCIUM 20 MG: 20 TABLET, FILM COATED ORAL at 21:08

## 2022-11-14 RX ADMIN — OXYCODONE 10 MG: 5 TABLET ORAL at 21:08

## 2022-11-14 RX ADMIN — MIDODRINE HYDROCHLORIDE 5 MG: 5 TABLET ORAL at 09:04

## 2022-11-14 ASSESSMENT — PAIN DESCRIPTION - ONSET
ONSET: ON-GOING
ONSET: SUDDEN

## 2022-11-14 ASSESSMENT — PAIN DESCRIPTION - PAIN TYPE
TYPE: SURGICAL PAIN
TYPE: SURGICAL PAIN
TYPE: ACUTE PAIN;SURGICAL PAIN
TYPE: SURGICAL PAIN
TYPE: ACUTE PAIN
TYPE: SURGICAL PAIN
TYPE: CHRONIC PAIN

## 2022-11-14 ASSESSMENT — PAIN SCALES - GENERAL
PAINLEVEL_OUTOF10: 7
PAINLEVEL_OUTOF10: 8
PAINLEVEL_OUTOF10: 6
PAINLEVEL_OUTOF10: 5
PAINLEVEL_OUTOF10: 2
PAINLEVEL_OUTOF10: 7
PAINLEVEL_OUTOF10: 6
PAINLEVEL_OUTOF10: 7

## 2022-11-14 ASSESSMENT — PAIN DESCRIPTION - FREQUENCY
FREQUENCY: CONTINUOUS

## 2022-11-14 ASSESSMENT — PAIN DESCRIPTION - ORIENTATION
ORIENTATION: MID
ORIENTATION: LEFT
ORIENTATION: MID
ORIENTATION: MID;LOWER
ORIENTATION: MID

## 2022-11-14 ASSESSMENT — PAIN DESCRIPTION - DESCRIPTORS
DESCRIPTORS: ACHING;DISCOMFORT
DESCRIPTORS: ACHING
DESCRIPTORS: ACHING;DISCOMFORT
DESCRIPTORS: DISCOMFORT
DESCRIPTORS: ACHING
DESCRIPTORS: SHARP
DESCRIPTORS: ACHING;DISCOMFORT

## 2022-11-14 ASSESSMENT — PAIN DESCRIPTION - LOCATION
LOCATION: INCISION;CHEST
LOCATION: CHEST;INCISION
LOCATION: BACK
LOCATION: CHEST
LOCATION: CHEST
LOCATION: CHEST;INCISION
LOCATION: CHEST;INCISION

## 2022-11-14 NOTE — PLAN OF CARE
Problem: Discharge Planning  Goal: Discharge to home or other facility with appropriate resources  Outcome: Progressing  Flowsheets (Taken 11/13/2022 1930)  Discharge to home or other facility with appropriate resources:   Identify barriers to discharge with patient and caregiver   Arrange for needed discharge resources and transportation as appropriate   Identify discharge learning needs (meds, wound care, etc)     Problem: Safety - Adult  Goal: Free from fall injury  Outcome: Progressing  Flowsheets (Taken 11/13/2022 2209)  Free From Fall Injury:   Instruct family/caregiver on patient safety   Based on caregiver fall risk screen, instruct family/caregiver to ask for assistance with transferring infant if caregiver noted to have fall risk factors     Problem: Neurosensory - Adult  Goal: Achieves maximal functionality and self care  Outcome: Progressing  Flowsheets (Taken 11/13/2022 1930)  Achieves maximal functionality and self care:   Monitor swallowing and airway patency with patient fatigue and changes in neurological status   Encourage and assist patient to increase activity and self care with guidance from physical therapy/occupational therapy   Encourage visually impaired, hearing impaired and aphasic patients to use assistive/communication devices     Problem: Respiratory - Adult  Goal: Achieves optimal ventilation and oxygenation  Outcome: Progressing  Flowsheets  Taken 11/13/2022 2209  Achieves optimal ventilation and oxygenation:   Assess for changes in respiratory status   Assess for changes in mentation and behavior   Position to facilitate oxygenation and minimize respiratory effort   Oxygen supplementation based on oxygen saturation or arterial blood gases  Taken 11/13/2022 1930  Achieves optimal ventilation and oxygenation:   Assess for changes in respiratory status   Assess for changes in mentation and behavior   Position to facilitate oxygenation and minimize respiratory effort   Oxygen supplementation based on oxygen saturation or arterial blood gases     Problem: Cardiovascular - Adult  Goal: Absence of cardiac dysrhythmias or at baseline  Outcome: Progressing  Flowsheets (Taken 11/13/2022 1930)  Absence of cardiac dysrhythmias or at baseline:   Monitor cardiac rate and rhythm   Assess for signs of decreased cardiac output   Administer antiarrhythmia medication and electrolyte replacement as ordered     Problem: Skin/Tissue Integrity - Adult  Goal: Incisions, wounds, or drain sites healing without S/S of infection  Outcome: Progressing  Flowsheets (Taken 11/13/2022 1930)  Incisions, Wounds, or Drain Sites Healing Without Sign and Symptoms of Infection: ADMISSION and DAILY: Assess and document risk factors for pressure ulcer development     Problem: Gastrointestinal - Adult  Goal: Maintains adequate nutritional intake  Outcome: Progressing  Flowsheets (Taken 11/13/2022 1930)  Maintains adequate nutritional intake:   Monitor percentage of each meal consumed   Identify factors contributing to decreased intake, treat as appropriate   Assist with meals as needed     Problem: Genitourinary - Adult  Goal: Absence of urinary retention  Outcome: Progressing  Flowsheets  Taken 11/13/2022 2209  Absence of urinary retention:   Assess patients ability to void and empty bladder   Monitor intake/output and perform bladder scan as needed   Place urinary catheter per Licensed Independent Practitioner order if needed  Taken 11/13/2022 1930  Absence of urinary retention:   Assess patients ability to void and empty bladder   Monitor intake/output and perform bladder scan as needed   Place urinary catheter per Licensed Independent Practitioner order if needed     Problem: Genitourinary - Adult  Goal: Urinary catheter remains patent  Outcome: Progressing  Flowsheets (Taken 11/13/2022 1930)  Urinary catheter remains patent:   Assess patency of urinary catheter   Irrigate catheter per Licensed Independent Practitioner order if indicated and notify Licensed Independent Practitioner if unable to irrigate   Assess need for a larger catheter size or a 3-way catheter for continuous bladder irrigation     Problem: Infection - Adult  Goal: Absence of infection during hospitalization  Outcome: Progressing  Flowsheets  Taken 11/13/2022 2209  Absence of infection during hospitalization:   Assess and monitor for signs and symptoms of infection   Monitor lab/diagnostic results   Monitor all insertion sites i.e., indwelling lines, tubes and drains  Taken 11/13/2022 1930  Absence of infection during hospitalization:   Assess and monitor for signs and symptoms of infection   Monitor lab/diagnostic results   Monitor all insertion sites i.e., indwelling lines, tubes and drains     Problem: Pain  Goal: Verbalizes/displays adequate comfort level or baseline comfort level  Outcome: Progressing  Flowsheets (Taken 11/13/2022 2209)  Verbalizes/displays adequate comfort level or baseline comfort level:   Encourage patient to monitor pain and request assistance   Assess pain using appropriate pain scale   Administer analgesics based on type and severity of pain and evaluate response   Implement non-pharmacological measures as appropriate and evaluate response   Consider cultural and social influences on pain and pain management   Notify Licensed Independent Practitioner if interventions unsuccessful or patient reports new pain     Problem: Nutrition Deficit:  Goal: Optimize nutritional status  Outcome: Progressing  Flowsheets (Taken 11/13/2022 2209)  Nutrient intake appropriate for improving, restoring, or maintaining nutritional needs: Monitor oral intake, labs, and treatment plans     Problem: Skin/Tissue Integrity  Goal: Absence of new skin breakdown  Description: 1. Monitor for areas of redness and/or skin breakdown  2. Assess vascular access sites hourly  3. Every 4-6 hours minimum:  Change oxygen saturation probe site  4. Every 4-6 hours:   If on nasal continuous positive airway pressure, respiratory therapy assess nares and determine need for appliance change or resting period. Outcome: Progressing  Note: No s/s of new skin breakdown. Will continue to monitor. Care plan reviewed with patient and wife. Patient and wife verbalize understanding of the plan of care and contribute to goal setting.

## 2022-11-14 NOTE — FLOWSHEET NOTE
11/14/22 1602 11/14/22 1614   Vital Signs   Temp 98.5 °F (36.9 °C) 99.9 °F (37.7 °C)   Temp Source Oral  --    Heart Rate 77 80   Heart Rate Source Monitor Monitor   Resp 22 20   /69  --    MAP (Calculated) 88  --    BP Location Right lower arm  --    BP Method Automatic  --    Patient Position Semi fowlers  --    Level of Consciousness 0  --    MEWS Score 2  --    Pain Assessment   Pain Assessment 0-10  --    Pain Level 8  Daralyn Collie aware)  --    Pain Location Chest  --    Pain Orientation Left  --    Pain Descriptors Sharp  --    Pain Type Acute pain  --    Pain Radiating Towards n/a  --    Pain Frequency Continuous  --    Pain Onset Sudden  --    Oxygen Therapy   SpO2 97 % 97 %   Pulse Oximetry Type Intermittent  --    Pulse Oximeter Device Mode Intermittent  --    Pulse Oximeter Device Location Finger  --    O2 Device None (Room air)  --    Skin Assessment Clean, dry, & intact  --        1550 This RN in to ambulate patient in hallway. Patient is very diaphoretic but denies pain or SOB at this time. 1555 Patient walked approximately 8-10 feet to hallway, reported to this RN that he had 8/10 sudden sharp chest pain. STAT EKG and CXR ordered. 1 VON [place]ART[place] notified and continuously updated.

## 2022-11-14 NOTE — CARE COORDINATION
11/14/22, 2:52 PM EST    DISCHARGE ON [address] day: 5  Location: -16/016-A Reason for admit: Abnormal findings on cardiac catheterization [R93.1]  Status post angioplasty with stent [Z95.820]  CAD in native artery [I25.10]   Procedure:   11/8 Cardiac Cath: Left heart cath and PCI to D1 and proximal LAD. Small piece of coronary wire had broken of after being intertwined with LAD stent/ The LAD had malformed due to broken coronary wire when attempted to retrieve it. Unable to cross the LAD stent to expend it. Terminated the procedure  11/8 Emergent 2v CABG  11/8 Intubated - 11/9 Extubated  11/12 CVC left subclavian  11/14 Chest tubes removed  11/14 CXR: Stable cardiomegaly, left basilar consolidation, and left effusion    Barriers to Discharge: POD #6. +BM. Chest tubes removed today. On room air. Afebrile. NSR. Ox4. CR/PT/OT. Dietitian consulted. Dietary ileus prevention protocol. Telemetry, I&O, daily weight, IS, sternal precautions, wound care, SCDs, ambulate. Amio, asa, lipitor, lovenox, pepcid, IV lasix 20 mg bid, lantus, SSI, lopressor, prn roxicodone, K+, flomax, electrolyte replacement protocols. K+ 3.4, wbc 11.3, hgb 8.4. PCP: Al Benito MD  Readmission Risk Score: 15%  Patient Goals/Plan/Treatment Preferences: Home with wife and new [place]  for RN/PT/OT. Has DME. SW on case.

## 2022-11-14 NOTE — PLAN OF CARE
Problem: Discharge Planning  Goal: Discharge to home or other facility with appropriate resources  Outcome: Progressing  Flowsheets (Taken 11/14/2022 0830)  Discharge to home or other facility with appropriate resources: Identify barriers to discharge with patient and caregiver     Problem: Safety - Adult  Goal: Free from fall injury  Outcome: Progressing  Flowsheets (Taken 11/14/2022 1215)  Free From Fall Injury: Instruct family/caregiver on patient safety     Problem: Neurosensory - Adult  Goal: Achieves maximal functionality and self care  Outcome: Progressing  Flowsheets (Taken 11/14/2022 0830)  Achieves maximal functionality and self care: Monitor swallowing and airway patency with patient fatigue and changes in neurological status     Problem: Respiratory - Adult  Goal: Achieves optimal ventilation and oxygenation  Outcome: Progressing  Flowsheets (Taken 11/14/2022 0830)  Achieves optimal ventilation and oxygenation: Assess and instruct to report shortness of breath or any respiratory difficulty     Problem: Cardiovascular - Adult  Goal: Absence of cardiac dysrhythmias or at baseline  Outcome: Progressing  Flowsheets (Taken 11/14/2022 0830)  Absence of cardiac dysrhythmias or at baseline: Monitor cardiac rate and rhythm     Problem: Skin/Tissue Integrity - Adult  Goal: Incisions, wounds, or drain sites healing without S/S of infection  Outcome: Progressing  Flowsheets  Taken 11/14/2022 1215  Incisions, Wounds, or Drain Sites Healing Without Sign and Symptoms of Infection: ADMISSION and DAILY: Assess and document risk factors for pressure ulcer development  Taken 11/14/2022 0830  Incisions, Wounds, or Drain Sites Healing Without Sign and Symptoms of Infection: ADMISSION and DAILY: Assess and document risk factors for pressure ulcer development     Problem: Gastrointestinal - Adult  Goal: Maintains adequate nutritional intake  Outcome: Progressing  Flowsheets (Taken 11/14/2022 0830)  Maintains adequate nutritional intake: Monitor percentage of each meal consumed     Problem: Genitourinary - Adult  Goal: Absence of urinary retention  Outcome: Progressing  Flowsheets (Taken 11/14/2022 0830)  Absence of urinary retention: Assess patients ability to void and empty bladder  Goal: Urinary catheter remains patent  Outcome: Progressing  Flowsheets (Taken 11/14/2022 0830)  Urinary catheter remains patent: Assess patency of urinary catheter     Problem: Infection - Adult  Goal: Absence of infection during hospitalization  Outcome: Progressing  Flowsheets (Taken 11/14/2022 0830)  Absence of infection during hospitalization: Assess and monitor for signs and symptoms of infection     Problem: Pain  Goal: Verbalizes/displays adequate comfort level or baseline comfort level  Outcome: Progressing  Flowsheets (Taken 11/14/2022 0830)  Verbalizes/displays adequate comfort level or baseline comfort level: Encourage patient to monitor pain and request assistance     Problem: Nutrition Deficit:  Goal: Optimize nutritional status  Outcome: Progressing  Flowsheets (Taken 11/13/2022 2209 by Guillermina Danielle RN)  Nutrient intake appropriate for improving, restoring, or maintaining nutritional needs: Monitor oral intake, labs, and treatment plans     Problem: Skin/Tissue Integrity  Goal: Absence of new skin breakdown  Description: 1. Monitor for areas of redness and/or skin breakdown  2. Assess vascular access sites hourly  3. Every 4-6 hours minimum:  Change oxygen saturation probe site  4. Every 4-6 hours:  If on nasal continuous positive airway pressure, respiratory therapy assess nares and determine need for appliance change or resting period. Outcome: Progressing  Note: Patient's skin is appropriate for ethnicity, warm, and dry, with no new skin issues noted this shift. Mucous membranes are pink, moist, and intact. Care plan reviewed with patient and family.   Patient and family verbalize understanding of the plan of care and contribute to goal setting.

## 2022-11-14 NOTE — PROGRESS NOTES
[facility] ICU STEPDOWN TELEMETRY 4K  Occupational Therapy  Daily Note  Time:   Time In: 09  Time Out: 1523  Timed Code Treatment Minutes: 24 Minutes  Minutes: 24          Date: 2022  Patient Name: Fred Lee,   Gender: male      Room: [facility]16/016-A  MRN: [id number]  : 1945  (78 y.o.)  Referring Practitioner: Dr. Rubens Walters  Diagnosis: abnormal findings on cardiac catheterization  Additional Pertinent Hx: Pt presented for scheduled heart cath on 2022. Per Cardio sx note: 78 y.o. male, s/p multiple PCI to LAD and transapical diagonal, originally presenting with exertional left parasternal, non-radiating chest pain relieved by rest, along with chronic progressive fatigue, underwent attempted PCI today. Procedure terminated with malaligned stent in LAD. Patient symptomatic with unstable angina after the procedure. s/p emergent CABG x 2 on 2022. Restrictions/Precautions:  Restrictions/Precautions: Surgical Protocols, Fall Risk  Position Activity Restriction  Sternal Precautions: No Pushing, No Pulling, 5# Lifting Restrictions  Other position/activity restrictions: s/p CABG x 2 on 2022     SUBJECTIVE: Nurse approved Tx. Spouse present, pt asleep in chair. Therapist reviewed sternal  precautions with pt and family. PAIN: does not report pain this day     Vitals: Vitals not assessed per clinical judgement, see nursing flowsheet    COGNITION: Slow Processing, Decreased Insight, and Decreased Arousal    ADL:   Grooming: Stand By Assistance and with set-up. Oral care seated   Toileting: Contact Guard Assistance. Assist with clothing management , pt constipated this day, increased time with task. Toilet Transfer: Contact Guard Assistance. To BSC . Nurse/aid to finish task     BALANCE:  Sitting Balance:  Supervision. Supported   Standing Balance: [address]  2 hand hold on 2ww     BED MOBILITY:  Not Tested    TRANSFERS:  Sit to Stand:  Contact Guard Assistance, cues for hand placement, with verbal cues. Stand to Sit: [employer], with increased time for completion, cues for hand placement. Adhere to precautions     FUNCTIONAL MOBILITY:  Assistive Device: Rolling Walker  Assist Level:  Contact Guard Assistance. Distance:  from chair to [facility], vc's for awareness with tubes,wires, etx     ASSESSMENT:     Activity Tolerance:  Patient tolerance of  treatment: good. Discharge Recommendations: Continue to assess pending progress  Equipment Recommendations: Other: Monitor pending progress; likely need BSC  Plan: Times Per Week: 6x  Current Treatment Recommendations: Balance training, Self-Care / ADL, Equipment evaluation, education, & procurement, Functional mobility training, Endurance training, Safety education & training    Patient Education  Patient Education: Role of OT and Plan of Care     Goals  Short Term Goals  Time Frame for Short Term Goals: until discharge  Short Term Goal 1: Pt will complete various sit-stand t/fs with min A x 1 & min vcs for sternal precautions  Short Term Goal 2: Pt will tolerate standing 4-5 min with CGA for increased ease of sinkside grooming  Short Term Goal 3: Pt will complete mobility to bedside chair or BSC with RW, CGA, & 0-2 vcs for safety  Short Term Goal 4: Pt will complete LE dressing with min A & adaptive strategies prn  Short Term Goal 5: Pt will tolerate BUE step 2 cardiac exercises x 10 reps with min RBs to increase endurance for BADL  Long Term Goals  Time Frame for Long Term Goals : No LTG set d/t short ELOS    Following session, patient left in safe position with all fall risk precautions in place.

## 2022-11-14 NOTE — PROGRESS NOTES
Order received for CVC removal per Dr Rebecca Bautista . Patient placed in bed. Transparent dressing removed. Skin accessed appears pink. Sutures removed, area cleaned with chloro prep, bio patch applied, sterile gauze placed over bio patch, CVC removed with green tip intact, pressure held with sterile gauze for 5 minutes. New transparent dressing applied. Educated the patient on remaining in bed for one hour, dressing stays on for 24 hours, signs and symptoms of infection and notify primary nurse if any blood or oozing. Elida GUNTER notified.

## 2022-11-14 NOTE — PROGRESS NOTES
CT/CV Surgery Progress Note    2022 7:39 AM  Surgeon:  Dr. Betty Rivera     Subjective: Mr. Omero Key is resting comfortably in bed, alert, and in no acute distress. Pt denies chest pressure, SOB, fever,chills, N/V/D. He is SR this morning. I/O last 3 completed shifts: In: 8844 [P.O.:2320; I.V.:10]  Out: 6475 [Urine:3800; Chest Tube:244]    Vital Signs: /79   Pulse 88   Temp 98.6 °F (37 °C) (Oral)   Resp 19   Ht 5' 6\" (1.676 m)   Wt 238 lb 9.6 oz (108.2 kg)   SpO2 96%   BMI 38.51 kg/m²    Temp (24hrs), Av.6 °F (37 °C), Min:98.3 °F (36.8 °C), Max:98.9 °F (37.2 °C)    Labs:   CBC:   Recent Labs     22  0545 22  0545 22  0505   WBC 11.1* 12.2* 11.3*   HGB 8.3* 9.6* 8.4*   HCT 24.7* 29.0* 25.4*   MCV 96.1* 97.3* 96.6*    249 215     BMP:   Recent Labs     22  0545 22  0841 22  0545 22  1118 22  0505 22  0722     --  141  --  140  --    K 3.9  --  3.6  --  3.4*  --      --  104  --  104  --    CO2 25  --  26  --  25  --    BUN 23*  --  25*  --  22  --    CREATININE 1.0  --  1.0  --  1.0  --    POCGLU  --    < >  --    < >  --  92    < > = values in this interval not displayed. Imaging:  Chest X-Ray: 2022   Stable cardiomegaly, left basilar consolidation, and left effusion.       Intake/Output Summary (Last 24 hours) at 2022 0739  Last data filed at 2022 0302  Gross per 24 hour   Intake 1800 ml   Output 3389 ml   Net -1589 ml     Scheduled Meds:    midodrine  5 mg Oral TID WC    amiodarone  200 mg Oral BID    potassium chloride  20 mEq Oral TID WC    insulin glargine  20 Units SubCUTAneous BID    furosemide  20 mg IntraVENous BID    insulin lispro  0-16 Units SubCUTAneous TID WC    insulin lispro  0-4 Units SubCUTAneous Nightly    tamsulosin  0.4 mg Oral Daily    sodium chloride flush  5-40 mL IntraVENous 2 times per day    aspirin  325 mg Oral Daily    multivitamin  1 tablet Oral Daily with breakfast sennosides-docusate sodium  1 tablet Oral BID    atorvastatin  20 mg Oral Nightly    famotidine  20 mg Oral BID     ROS: All neg unless specifically mentioned in subjective section. Exam:  General Appearance: alert ,conversing, in no acute distress  Cardiovascular: normal rate, regular rhythm, normal S1 and S2, no murmurs, rubs, clicks, or gallops  Pulmonary/Chest: clear to auscultation bilaterally- no wheezes, rales or rhonchi, normal air movement, no respiratory distress  Neurological: alert, oriented, normal speech, no focal findings or movement disorder noted  Sternum: Incision healing appropriately and no wound dehiscence noted. Assessment:   Patient Active Problem List   Diagnosis    Hypertension    Hyperlipidemia    Anxiety    Abnormal findings on cardiac catheterization    Spondylolisthesis    CAD (coronary artery disease) cath 06/2011 40 to 50% stenosis in mid LAD, Patent diagonal stent,40 to 50% Mid Lcx stenosis,patent RCA stent EF 55%    GERD (gastroesophageal reflux disease)    Syncopal episodes    Paroxysmal atrial fibrillation (HCC)    [employer] linq loop recorder    Bradycardia    Angina of effort ([address]    Status post angioplasty with stent    S/P CABG x 2    CAD in native artery     Plan:   CXR reviewed- Continue daily CXR's   2.   Continue current therapy    The plan of care was discussed in detail with Dr. Dawood Padron       Electronically signed by Ofelia Ovalle PA-C on 11/14/2022 at 7:39 AM

## 2022-11-14 NOTE — PROGRESS NOTES
[address] [address]  INPATIENT PHYSICAL THERAPY  DAILY NOTE  [facility] ICU STEPDOWN TELEMETRY 4K - 4K-16/016-A      Time In: 1046  Time Out: 1115  Timed Code Treatment Minutes: 29 Minutes  Minutes: 29          Date: 2022  Patient Name: Fred Lee,  Gender:  male        MRN: [id number]  : 1945  (78 y.o.)     Referring Practitioner: Kianna Post MD  Diagnosis: Abnormal findings on cardiac catheterization  Additional Pertinent Hx: 78 y.o. male, s/p multiple PCI to LAD and transapical diagonal, originally presenting with exertional left parasternal, non-radiating chest pain relieved by rest, along with chronic progressive fatigue, underwent attempted PCI today. Procedure terminated with malaligned stent in LAD. Patient symptomatic with unstable angina after the procedure. Pt is s/p emergent CABG x 2 . Prior Level of Function:  Lives With: Spouse  Type of Home: House  Home Layout: One level  Home Access: Stairs to enter without rails  Entrance Stairs - Number of Steps: 1 small threshold  Home Equipment: Rollator, Cane   Bathroom Shower/Tub: Tub/Shower unit (Pt reports he likes to get down into tub)  Bathroom Toilet: Standard    ADL Assistance: Needs assistance (occassional A-Pt unable to clarify with what he needed A for)  Homemaking Assistance: Needs assistance  Ambulation Assistance: Independent  Transfer Assistance: Independent  Active :  Yes  Additional Comments: Per PT eliecer, Pt amb with SC in home, rollator in community    Restrictions/Precautions:  Restrictions/Precautions: Surgical Protocols, Fall Risk  Position Activity Restriction  Sternal Precautions: No Pushing, No Pulling, 5# Lifting Restrictions  Other position/activity restrictions: s/p CABG x 2 on 2022       SUBJECTIVE: pt up in chair and agreeable for therapy - pts wife present and somewhat observed session and stated that due to a previous shoulder injury she isn't going to be able to assist Cedrick Chavez with tranfers and was asking about different facilities for him to go to because she isn't able to assist- did contact case management and deferred these questions to her    PAIN: no number given pt reported sore buttocks     Vitals: Vitals not assessed per clinical judgement, see nursing flowsheet    OBJECTIVE:  Bed Mobility:  Sit to Supine: Moderate Assistance, at diane LEs and then once LEs in bed he needed assist to reposition shoulders and he was able to walk his legs to the middle of the bed      Transfers:  Sit to Stand: Minimal Assistance, with verbal cues  Stand to Sit:Contact Guard Assistance    Ambulation:  Contact Guard Assistance  Distance: 45x1  Surface: Level Tile  Device:Rolling Walker  Gait Deviations:  Slow Lin noted decreased heel strike at right LE but demonstrated good heel strike when cues provided, also noted decreased hip and knee flexion at right vs left LE during swing phase - cues for walker management   Exercise:  Patient was guided in 1 set(s) 10 reps of exercise to both lower extremities. Step 2 CABG ex  . Exercises were completed for increased independence with functional mobility. Functional Outcome Measures: Completed  AM-PAC Inpatient Mobility without Stair Climbing Raw Score : 13  AM-PAC Inpatient without Stair Climbing T-Scale Score : 38.96    ASSESSMENT:  Assessment: Patient progressing toward established goals. Activity Tolerance:  Patient tolerance of  treatment: good.         Equipment Recommendations:Equipment Needed: No  Discharge Recommendations:  pt may require a therapy stay prior to return home if wife unable to provide assist at discharge   Plan: Current Treatment Recommendations: Strengthening, Balance training, Functional mobility training, Transfer training, Endurance training, Neuromuscular re-education, Gait training, Stair training, Patient/Caregiver education & training, Therapeutic activities, Home exercise program, Safety education & training  General Plan:  (6x CABG)    Patient Education  Patient Education: Plan of Care    Goals:  Patient Goals : to go home  Short Term Goals  Time Frame for Short Term Goals: by discharge  Short Term Goal 1: Pt to transfer supine <--> sit SBA to enable pt to get in/out of bed. Short Term Goal 2: Pt to transfer sit <--> stand SBA for increased functional mobility. Short Term Goal 3: Pt to ambulate >200 feet with LRAD SBA for household ambulation. Short Term Goal 4: Pt to ascend/descend 1 step with AD CGA for home entry. Long Term Goals  Time Frame for Long Term Goals : NA due to short length of stay. Following session, patient left in safe position with all fall risk precautions in place.

## 2022-11-14 NOTE — CARE COORDINATION
11/14/22, 2:41 PM EST    DISCHARGE PLANNING EVALUATION     SW met with pt and wife to confirm discharge plan, wife admits she was feeling a little uneasy taking pt home tomorrow due to pt only ambulating a short distance. SW offered IPR or SNF, wife refused. Wife states she would feel better if pt could be discharged later tomorrow so therapy can work with pt first before going home. Wife provided a list of [facility] agencies and the only one accepting pts insurance or that had staffing was [facility] [phone]. EMBER spoke with Donis Betancourt at [facility] for referral and faxed chart [phone].

## 2022-11-15 ENCOUNTER — APPOINTMENT (OUTPATIENT)
Dept: GENERAL RADIOLOGY | Age: 77
DRG: 235 | End: 2022-11-15
Attending: INTERNAL MEDICINE
Payer: MEDICARE

## 2022-11-15 VITALS
WEIGHT: 232.6 LBS | HEART RATE: 82 BPM | OXYGEN SATURATION: 97 % | DIASTOLIC BLOOD PRESSURE: 71 MMHG | SYSTOLIC BLOOD PRESSURE: 139 MMHG | RESPIRATION RATE: 18 BRPM | HEIGHT: 66 IN | TEMPERATURE: 98.5 F | BODY MASS INDEX: 37.38 KG/M2

## 2022-11-15 LAB
ANION GAP SERPL CALCULATED.3IONS-SCNC: 8 MEQ/L (ref 8–16)
BUN BLDV-MCNC: 21 MG/DL (ref 7–22)
CALCIUM SERPL-MCNC: 8.4 MG/DL (ref 8.5–10.5)
CHLORIDE BLD-SCNC: 106 MEQ/L (ref 98–111)
CO2: 26 MEQ/L (ref 23–33)
CREAT SERPL-MCNC: 1.1 MG/DL (ref 0.4–1.2)
EKG ATRIAL RATE: 69 BPM
EKG P AXIS: 51 DEGREES
EKG P-R INTERVAL: 200 MS
EKG Q-T INTERVAL: 418 MS
EKG QRS DURATION: 92 MS
EKG QTC CALCULATION (BAZETT): 447 MS
EKG R AXIS: 27 DEGREES
EKG T AXIS: 63 DEGREES
EKG VENTRICULAR RATE: 69 BPM
ERYTHROCYTE [DISTWIDTH] IN BLOOD BY AUTOMATED COUNT: 14.5 % (ref 11.5–14.5)
ERYTHROCYTE [DISTWIDTH] IN BLOOD BY AUTOMATED COUNT: 50.5 FL (ref 35–45)
GFR SERPL CREATININE-BSD FRML MDRD: > 60 ML/MIN/1.73M2
GLUCOSE BLD-MCNC: 78 MG/DL (ref 70–108)
GLUCOSE BLD-MCNC: 81 MG/DL (ref 70–108)
GLUCOSE BLD-MCNC: 98 MG/DL (ref 70–108)
GLUCOSE BLD-MCNC: 99 MG/DL (ref 70–108)
HCT VFR BLD CALC: 27.6 % (ref 42–52)
HEMOGLOBIN: 9 GM/DL (ref 14–18)
MCH RBC QN AUTO: 32 PG (ref 26–33)
MCHC RBC AUTO-ENTMCNC: 32.6 GM/DL (ref 32.2–35.5)
MCV RBC AUTO: 98.2 FL (ref 80–94)
PLATELET # BLD: 264 THOU/MM3 (ref 130–400)
PMV BLD AUTO: 10 FL (ref 9.4–12.4)
POTASSIUM SERPL-SCNC: 4.8 MEQ/L (ref 3.5–5.2)
RBC # BLD: 2.81 MILL/MM3 (ref 4.7–6.1)
SODIUM BLD-SCNC: 140 MEQ/L (ref 135–145)
WBC # BLD: 10.4 THOU/MM3 (ref 4.8–10.8)

## 2022-11-15 PROCEDURE — 36415 COLL VENOUS BLD VENIPUNCTURE: CPT

## 2022-11-15 PROCEDURE — 71045 X-RAY EXAM CHEST 1 VIEW: CPT

## 2022-11-15 PROCEDURE — 93010 ELECTROCARDIOGRAM REPORT: CPT | Performed by: INTERNAL MEDICINE

## 2022-11-15 PROCEDURE — 97535 SELF CARE MNGMENT TRAINING: CPT

## 2022-11-15 PROCEDURE — 97116 GAIT TRAINING THERAPY: CPT

## 2022-11-15 PROCEDURE — 80048 BASIC METABOLIC PNL TOTAL CA: CPT

## 2022-11-15 PROCEDURE — 97530 THERAPEUTIC ACTIVITIES: CPT

## 2022-11-15 PROCEDURE — 93005 ELECTROCARDIOGRAM TRACING: CPT | Performed by: THORACIC SURGERY (CARDIOTHORACIC VASCULAR SURGERY)

## 2022-11-15 PROCEDURE — 6360000002 HC RX W HCPCS: Performed by: THORACIC SURGERY (CARDIOTHORACIC VASCULAR SURGERY)

## 2022-11-15 PROCEDURE — APPSS60 APP SPLIT SHARED TIME 46-60 MINUTES: Performed by: PHYSICIAN ASSISTANT

## 2022-11-15 PROCEDURE — 2580000003 HC RX 258: Performed by: THORACIC SURGERY (CARDIOTHORACIC VASCULAR SURGERY)

## 2022-11-15 PROCEDURE — 6360000002 HC RX W HCPCS: Performed by: PHYSICIAN ASSISTANT

## 2022-11-15 PROCEDURE — 6370000000 HC RX 637 (ALT 250 FOR IP): Performed by: THORACIC SURGERY (CARDIOTHORACIC VASCULAR SURGERY)

## 2022-11-15 PROCEDURE — 85027 COMPLETE CBC AUTOMATED: CPT

## 2022-11-15 PROCEDURE — 97110 THERAPEUTIC EXERCISES: CPT

## 2022-11-15 PROCEDURE — 82948 REAGENT STRIP/BLOOD GLUCOSE: CPT

## 2022-11-15 RX ORDER — AMIODARONE HYDROCHLORIDE 200 MG/1
200 TABLET ORAL 2 TIMES DAILY
Qty: 60 TABLET | Refills: 0 | Status: SHIPPED | OUTPATIENT
Start: 2022-11-15 | End: 2022-11-15 | Stop reason: SDUPTHER

## 2022-11-15 RX ORDER — OXYCODONE HYDROCHLORIDE 5 MG/1
5 TABLET ORAL EVERY 8 HOURS PRN
Qty: 21 TABLET | Refills: 0 | Status: ON HOLD | OUTPATIENT
Start: 2022-11-15 | End: 2022-11-23 | Stop reason: HOSPADM

## 2022-11-15 RX ORDER — POTASSIUM CHLORIDE 20 MEQ/1
20 TABLET, EXTENDED RELEASE ORAL DAILY
Qty: 30 TABLET | Refills: 0 | Status: ON HOLD | OUTPATIENT
Start: 2022-11-15 | End: 2022-11-23 | Stop reason: HOSPADM

## 2022-11-15 RX ORDER — MULTIVITAMIN WITH IRON
1 TABLET ORAL
Qty: 30 TABLET | Refills: 0 | Status: ON HOLD | OUTPATIENT
Start: 2022-11-15 | End: 2022-11-18 | Stop reason: HOSPADM

## 2022-11-15 RX ORDER — ASPIRIN 81 MG/1
81 TABLET ORAL DAILY
Qty: 90 TABLET | Refills: 1 | Status: SHIPPED | OUTPATIENT
Start: 2022-11-15

## 2022-11-15 RX ORDER — AMIODARONE HYDROCHLORIDE 200 MG/1
200 TABLET ORAL DAILY
Qty: 30 TABLET | Refills: 0 | Status: SHIPPED | OUTPATIENT
Start: 2022-11-15 | End: 2022-12-07 | Stop reason: SDUPTHER

## 2022-11-15 RX ORDER — FUROSEMIDE 20 MG/1
20 TABLET ORAL DAILY
Qty: 30 TABLET | Refills: 0 | Status: ON HOLD | OUTPATIENT
Start: 2022-11-15 | End: 2022-11-23 | Stop reason: HOSPADM

## 2022-11-15 RX ORDER — CLOPIDOGREL BISULFATE 75 MG/1
75 TABLET ORAL DAILY
Qty: 30 TABLET | Refills: 3 | Status: SHIPPED | OUTPATIENT
Start: 2022-11-15 | End: 2022-12-01

## 2022-11-15 RX ADMIN — OXYCODONE 10 MG: 5 TABLET ORAL at 02:02

## 2022-11-15 RX ADMIN — FUROSEMIDE 20 MG: 10 INJECTION, SOLUTION INTRAMUSCULAR; INTRAVENOUS at 08:56

## 2022-11-15 RX ADMIN — POTASSIUM CHLORIDE 40 MEQ: 1500 TABLET, EXTENDED RELEASE ORAL at 08:57

## 2022-11-15 RX ADMIN — ENOXAPARIN SODIUM 30 MG: 100 INJECTION SUBCUTANEOUS at 08:56

## 2022-11-15 RX ADMIN — ASPIRIN 325 MG: 325 TABLET, COATED ORAL at 08:56

## 2022-11-15 RX ADMIN — Medication 1 TABLET: at 08:57

## 2022-11-15 RX ADMIN — SODIUM CHLORIDE, PRESERVATIVE FREE 10 ML: 5 INJECTION INTRAVENOUS at 08:56

## 2022-11-15 RX ADMIN — FAMOTIDINE 20 MG: 20 TABLET ORAL at 08:57

## 2022-11-15 RX ADMIN — SENNOSIDES AND DOCUSATE SODIUM 1 TABLET: 50; 8.6 TABLET ORAL at 08:56

## 2022-11-15 RX ADMIN — AMIODARONE HYDROCHLORIDE 200 MG: 200 TABLET ORAL at 08:57

## 2022-11-15 RX ADMIN — TAMSULOSIN HYDROCHLORIDE 0.4 MG: 0.4 CAPSULE ORAL at 08:57

## 2022-11-15 RX ADMIN — POTASSIUM CHLORIDE 40 MEQ: 1500 TABLET, EXTENDED RELEASE ORAL at 12:35

## 2022-11-15 RX ADMIN — METOPROLOL TARTRATE 25 MG: 25 TABLET, FILM COATED ORAL at 08:57

## 2022-11-15 ASSESSMENT — PAIN DESCRIPTION - ORIENTATION
ORIENTATION: MID
ORIENTATION: MID

## 2022-11-15 ASSESSMENT — PAIN DESCRIPTION - LOCATION
LOCATION: CHEST;INCISION
LOCATION: CHEST;INCISION

## 2022-11-15 ASSESSMENT — PAIN DESCRIPTION - ONSET: ONSET: ON-GOING

## 2022-11-15 ASSESSMENT — PAIN DESCRIPTION - DESCRIPTORS
DESCRIPTORS: ACHING;DISCOMFORT
DESCRIPTORS: ACHING;DISCOMFORT

## 2022-11-15 ASSESSMENT — PAIN SCALES - GENERAL
PAINLEVEL_OUTOF10: 7
PAINLEVEL_OUTOF10: 2
PAINLEVEL_OUTOF10: 2

## 2022-11-15 ASSESSMENT — PAIN DESCRIPTION - FREQUENCY: FREQUENCY: CONTINUOUS

## 2022-11-15 ASSESSMENT — PAIN DESCRIPTION - PAIN TYPE: TYPE: SURGICAL PAIN

## 2022-11-15 NOTE — PROGRESS NOTES
Pt complaining of the recurrent left sided chest pain that he had originally felt around 1555 on 11/14/2022. Vital signs taken. HR 73 and /79. Stat EKG was obtained. No significant changes at this time.

## 2022-11-15 NOTE — PROGRESS NOTES
Nutrition Education  Pt s/p CABG (11/8). Pt discharged & gone. Learners:  see above  Readiness:  N/A  Method: Handout  Response:  Mailed written heart healthy low sodium carb controlled diet education ~1800kcals 1800ml fluid restriction written diet materials  Contact name and number provided.     Aiyana Lopez RD, LD  Contact Number: [phone]

## 2022-11-15 NOTE — PLAN OF CARE
Problem: Discharge Planning  Goal: Discharge to home or other facility with appropriate resources  11/15/2022 0051 by Sun Medina RN  Outcome: Progressing  Flowsheets (Taken 11/14/2022 2100)  Discharge to home or other facility with appropriate resources:   Identify barriers to discharge with patient and caregiver   Identify discharge learning needs (meds, wound care, etc)     Problem: Safety - Adult  Goal: Free from fall injury  11/15/2022 0051 by Sun Medina RN  Outcome: Progressing  Flowsheets (Taken 11/15/2022 0051)  Free From Fall Injury: Instruct family/caregiver on patient safety     Problem: Neurosensory - Adult  Goal: Achieves maximal functionality and self care  11/15/2022 0051 by Sun Medina RN  Outcome: Progressing  Flowsheets (Taken 11/14/2022 2100)  Achieves maximal functionality and self care: Monitor swallowing and airway patency with patient fatigue and changes in neurological status     Problem: Respiratory - Adult  Goal: Achieves optimal ventilation and oxygenation  11/15/2022 0051 by Sun Medina RN  Outcome: Progressing  Flowsheets (Taken 11/14/2022 2100)  Achieves optimal ventilation and oxygenation:   Assess for changes in respiratory status   Assess for changes in mentation and behavior   Position to facilitate oxygenation and minimize respiratory effort   Assess and instruct to report shortness of breath or any respiratory difficulty   Respiratory therapy support as indicated     Problem: Cardiovascular - Adult  Goal: Absence of cardiac dysrhythmias or at baseline  11/15/2022 0051 by Sun Medina RN  Outcome: Progressing  Flowsheets (Taken 11/14/2022 2100)  Absence of cardiac dysrhythmias or at baseline:   Assess for signs of decreased cardiac output   Monitor cardiac rate and rhythm   Administer antiarrhythmia medication and electrolyte replacement as ordered     Problem: Skin/Tissue Integrity - Adult  Goal: Incisions, wounds, or drain sites healing without S/S of infection  11/15/2022 0051 by Norma Sawant RN  Outcome: Progressing  Flowsheets  Taken 11/15/2022 0051  Incisions, Wounds, or Drain Sites Healing Without Sign and Symptoms of Infection: ADMISSION and DAILY: Assess and document risk factors for pressure ulcer development  Taken 11/14/2022 2100  Incisions, Wounds, or Drain Sites Healing Without Sign and Symptoms of Infection: ADMISSION and DAILY: Assess and document risk factors for pressure ulcer development     Problem: Gastrointestinal - Adult  Goal: Maintains adequate nutritional intake  11/15/2022 0051 by Norma Sawant RN  Outcome: Progressing  Flowsheets (Taken 11/14/2022 2100)  Maintains adequate nutritional intake:   Monitor percentage of each meal consumed   Assist with meals as needed   Monitor intake and output, weight and lab values     Problem: Genitourinary - Adult  Goal: Absence of urinary retention  11/15/2022 0051 by Norma Sawant RN  Outcome: Progressing  Flowsheets (Taken 11/14/2022 2100)  Absence of urinary retention:   Assess patients ability to void and empty bladder   Monitor intake/output and perform bladder scan as needed     Problem: Genitourinary - Adult  Goal: Urinary catheter remains patent  11/15/2022 0051 by Norma Sawant RN  Outcome: Progressing     Problem: Infection - Adult  Goal: Absence of infection during hospitalization  11/15/2022 0051 by Norma Sawant RN  Outcome: Progressing  Flowsheets (Taken 11/14/2022 2100)  Absence of infection during hospitalization:   Assess and monitor for signs and symptoms of infection   Monitor lab/diagnostic results   Monitor all insertion sites i.e., indwelling lines, tubes and drains   Administer medications as ordered   Instruct and encourage patient and family to use good hand hygiene technique     Problem: Pain  Goal: Verbalizes/displays adequate comfort level or baseline comfort level  11/15/2022 0051 by Norma Sawant RN  Outcome: Progressing  Flowsheets (Taken 11/14/2022 2100)  Verbalizes/displays adequate comfort level or baseline comfort level:   Encourage patient to monitor pain and request assistance   Assess pain using appropriate pain scale   Administer analgesics based on type and severity of pain and evaluate response   Implement non-pharmacological measures as appropriate and evaluate response   Consider cultural and social influences on pain and pain management     Problem: Nutrition Deficit:  Goal: Optimize nutritional status  11/15/2022 0051 by Puneet Cook RN  Outcome: Progressing  Flowsheets (Taken 11/15/2022 0051)  Nutrient intake appropriate for improving, restoring, or maintaining nutritional needs: Monitor oral intake, labs, and treatment plans     Problem: Skin/Tissue Integrity  Goal: Absence of new skin breakdown  Description: 1. Monitor for areas of redness and/or skin breakdown  2. Assess vascular access sites hourly  3. Every 4-6 hours minimum:  Change oxygen saturation probe site  4. Every 4-6 hours:  If on nasal continuous positive airway pressure, respiratory therapy assess nares and determine need for appliance change or resting period. 11/15/2022 0051 by Puneet Cook RN  Outcome: Progressing  Care plan reviewed with patient and family. Patient and family verbalize understanding of the plan of care and contribute to goal setting.

## 2022-11-15 NOTE — PROGRESS NOTES
Discharge teaching and instructions for diagnosis/procedure of CABG completed with patient using teachback method. AVS reviewed. Printed prescriptions given to patient. Patient voiced understanding regarding prescriptions, follow up appointments, and care of self at home. Discharged in a wheelchair to  home with support per family    Sutures remain intact from chest tube sites. Educated patient and family that [facility] can remove sutures.  Instructions added to AVS.

## 2022-11-15 NOTE — FLOWSHEET NOTE
11/15/22 1035   Safe Environment   Safety Measures   (Virtual RN rounds complete)   PT sitting upright in bed, No needs identified at this time. No acute distress noted. Gerry Sung

## 2022-11-15 NOTE — PROGRESS NOTES
[address] [address]  INPATIENT PHYSICAL THERAPY  DAILY NOTE  [facility] ICU STEPDOWN TELEMETRY 4K - 4K-16/016-A      Time In: 1028  Time Out: 1124  Timed Code Treatment Minutes: 64 Minutes  Minutes: 56          Date: 11/15/2022  Patient Name: Binu Harris,  Gender:  male        MRN: [id number]  : 1945  (78 y.o.)     Referring Practitioner: Yudith Johnson MD  Diagnosis: Abnormal findings on cardiac catheterization  Additional Pertinent Hx: 78 y.o. male, s/p multiple PCI to LAD and transapical diagonal, originally presenting with exertional left parasternal, non-radiating chest pain relieved by rest, along with chronic progressive fatigue, underwent attempted PCI today. Procedure terminated with malaligned stent in LAD. Patient symptomatic with unstable angina after the procedure. Pt is s/p emergent CABG x 2 . Prior Level of Function:  Lives With: Spouse  Type of Home: House  Home Layout: One level  Home Access: Stairs to enter without rails  Entrance Stairs - Number of Steps: 1 small threshold  Home Equipment: Rollator, Cane   Bathroom Shower/Tub: Tub/Shower unit (Pt reports he likes to get down into tub)  Bathroom Toilet: Standard    ADL Assistance: Needs assistance (occassional A-Pt unable to clarify with what he needed A for)  Homemaking Assistance: Needs assistance  Ambulation Assistance: Independent  Transfer Assistance: Independent  Active :  Yes  Additional Comments: Per PT eliecer, Pt amb with SC in home, rollator in community    Restrictions/Precautions:  Restrictions/Precautions: Surgical Protocols, Fall Risk  Position Activity Restriction  Sternal Precautions: No Pushing, No Pulling, 5# Lifting Restrictions  Other position/activity restrictions: s/p CABG x 2 on 2022       SUBJECTIVE: wife present for education as the previous date she reported that she wasn't able to help him at discharge - so extensive education completed for prep to return home later this date     PAIN: no number given pt c/o of general sx pain    Vitals: Vitals not assessed per clinical judgement, see nursing flowsheet    OBJECTIVE:  Bed Mobility:  Supine to Sit: Minimal Assistance, provided cues for proper hand placement and for tips for return home - discussed if she is struggling to help him out of bed that maybe a recliner would be better till he is moving better   Sit to Supine: Minimal Assistance, again provided extensive education on hand placement and how to straight hips and where to assist if shoulders needed readjusted at home    Scooting: Stand By Assistance  - spent extra time going over bed mobility as pt will require hands on assist from wife at discharge   Transfers:  Sit to Stand: Stand By Assistance  Stand to Sit:Stand By Assistance  - discussed safety concerns with sitting in an office chair and to pick a sturdy chair that doesn't sit too low   -verbally reviewed and completed a simulated SUV transfer while maintaining sternal precautions   Ambulation:  Stand By Assistance, to occ CGA   Distance: 175x1, 60x1  Surface: Level Tile  Device:Rolling Walker  Gait Deviations:  Slow Lin with decreased step length and flexed posture- cues to stay closer to walker and for increased heel strike - pt did take a standing rest break- discussed safety concerns as they have dogs at home - and discussed walking program and progression and to cont to use walker till balance and endurance improve- asked a couple of times about a step to enter into the home and they reported everything is handicap and level and will not have any steps- encouraged pt not to get into his camper till balance and strength improve     Exercise:  Patient was guided in 1 set(s) 10 reps of exercise to both lower extremities. Step 2 CABG exercises . Exercises were completed for increased independence with functional mobility.     Functional Outcome Measures: Completed  AM-PAC Inpatient Mobility without Stair Climbing Raw Score : 15  AM-PAC Inpatient without Stair Climbing T-Scale Score : 43.03    ASSESSMENT:  Assessment: Patient progressing toward established goals. Activity Tolerance:  Patient tolerance of  treatment: fair. Rest breaks given      Equipment Recommendations:Equipment Needed: No  Discharge Recommendations:  planning home with 24 hour assist from wife, recommend home health PT at discharge   Plan: Current Treatment Recommendations: Strengthening, Balance training, Functional mobility training, Transfer training, Endurance training, Neuromuscular re-education, Gait training, Stair training, Patient/Caregiver education & training, Therapeutic activities, Home exercise program, Safety education & training  General Plan:  (6x CABG)    Patient Education  Patient Education: Plan of Care, Home Exercise Program, Bed Mobility, Transfers, Gait    Goals:  Patient Goals : to go home  Short Term Goals  Time Frame for Short Term Goals: by discharge  Short Term Goal 1: Pt to transfer supine <--> sit SBA to enable pt to get in/out of bed. Short Term Goal 2: Pt to transfer sit <--> stand SBA for increased functional mobility. Short Term Goal 3: Pt to ambulate >200 feet with LRAD SBA for household ambulation. Short Term Goal 4: Pt to ascend/descend 1 step with AD CGA for home entry. Long Term Goals  Time Frame for Long Term Goals : NA due to short length of stay. Following session, patient left in safe position with all fall risk precautions in place.

## 2022-11-15 NOTE — DISCHARGE SUMMARY
CT/CV Surgery Discharge Summary     Pt Name: Dixie Faria  MRN: [id number]  YOB: 1945  Primary Care Physician: Jesse Sanchez MD    Admit date:  11/8/2022  8:39 AM     Discharge date:  11/15/22     Disposition: Home    Admitting Diagnosis: CAD    Discharge Diagnosis: CAD    Condition: Stable    Problem List:   Patient Active Problem List   Diagnosis Code    Hypertension I10    Hyperlipidemia E78.5    Anxiety F41.9    Abnormal findings on cardiac catheterization R93.1    Spondylolisthesis M43.10    CAD (coronary artery disease) cath 06/2011 40 to 50% stenosis in mid LAD, Patent diagonal stent,40 to 50% Mid Lcx stenosis,patent RCA stent EF 55% I25.10    GERD (gastroesophageal reflux disease) K21.9    Syncopal episodes R55    Paroxysmal atrial fibrillation (HCC) I48.0    [employer] linq loop recorder Z95.818    Bradycardia R00.1    Angina of effort ([address] I20.8    Status post angioplasty with stent Z95.820    S/P CABG x 2 Z95.1    CAD in native artery I25.10     Procedures:  11/8/22  1)  Emergency off-pump coronary artery bypass grafting x 2, with the left internal mammary artery grafted to the left anterior descending coronary artery, and a reversed greater saphenous aortocoronary vein graft to the first diagonal coronary artery     2) Endoscopic greater saphenous vein harvest     Reason for Admission: \"77 y.o. male, s/p multiple PCI to LAD and transapical diagonal, originally presenting with exertional left parasternal, non-radiating chest pain relieved by rest, along with chronic progressive fatigue, underwent attempted PCI today. Procedure terminated with malaligned stent in LAD. Patient symptomatic with unstable angina after the procedure. Patient on chronic clopidogrel. History also of paroxysmal atrial fibrillation, controlled with sotalol,\" per Dr. Courtney Stands H&P. Hospital Course:   Following an uncomplicated  CABG, the patient had an unremarkable and progressive convalescence without adverse events. At time of discharge, Samina Kiser was alert, tolerating a regular diet, having bowel movements, ambulating on his own accord and had adequate analgesia on oral pain medications, and had no signs of any complications. Discharge Vitals:  height is 5' 6\" (1.676 m) and weight is 232 lb 9.6 oz (105.5 kg). His axillary temperature is 97.6 °F (36.4 °C). His blood pressure is 125/68 and his pulse is 76. His respiration is 18 and oxygen saturation is 97%. DISCHARGE INSTRUCTIONS:  Discharge Medications:         Medication List        START taking these medications      amiodarone 200 MG tablet  Commonly known as: CORDARONE  Take 1 tablet by mouth daily     aspirin EC 81 MG EC tablet  Take 1 tablet by mouth daily  Replaces: aspirin 81 MG tablet     furosemide 20 MG tablet  Commonly known as: Lasix  Take 1 tablet by mouth daily     metoprolol tartrate 25 MG tablet  Commonly known as: LOPRESSOR  Take 1 tablet by mouth 2 times daily     multivitamin Tabs tablet  Take 1 tablet by mouth daily (with breakfast)     oxyCODONE 5 MG immediate release tablet  Commonly known as: ROXICODONE  Take 1 tablet by mouth every 8 hours as needed for Pain for up to 7 days.             CHANGE how you take these medications      clopidogrel 75 MG tablet  Commonly known as: Plavix  Take 1 tablet by mouth daily  What changed:   how much to take  how to take this  when to take this  additional instructions     potassium chloride 20 MEQ extended release tablet  Commonly known as: KLOR-CON M  Take 1 tablet by mouth daily  What changed:   how much to take  how to take this  when to take this  additional instructions            CONTINUE taking these medications      DULoxetine 30 MG extended release capsule  Commonly known as: CYMBALTA     esomeprazole 40 MG delayed release capsule  Commonly known as: NEXIUM     fish oil-omega-3 fatty acids 1000 MG capsule     nitroGLYCERIN 0.4 MG SL tablet  Commonly known as: NITROSTAT  Place 1 tablet under the tongue every 5 minutes as needed (PRN)     oxybutynin 5 MG tablet  Commonly known as: DITROPAN     pantoprazole sodium 40 MG Pack packet  Commonly known as: PROTONIX     pravastatin 80 MG tablet  Commonly known as: PRAVACHOL  Take 1 tablet by mouth once daily     pregabalin 50 MG capsule  Commonly known as: LYRICA     tamsulosin 0.4 MG capsule  Commonly known as: FLOMAX     VITAMIN B 12 PO            STOP taking these medications      aspirin 81 MG tablet  Replaced by: aspirin EC 81 MG EC tablet     irbesartan-hydroCHLOROthiazide 150-12.5 MG per tablet  Commonly known as: AVALIDE     sotalol 80 MG tablet  Commonly known as: BETAPACE            ASK your doctor about these medications      rOPINIRole 0.5 MG tablet  Commonly known as: REQUIP     tadalafil 10 MG tablet  Commonly known as: CIALIS  Take 1 tablet by mouth daily as needed for Erectile Dysfunction     tiZANidine 4 MG tablet  Commonly known as: Gloriakunal Taylor               Where to Get Your Medications        These medications were sent to [address] [place] [address] , [address]  [address] [place] [address], [address] [address]      Phone: [phone]   amiodarone 200 MG tablet  aspirin EC 81 MG EC tablet  clopidogrel 75 MG tablet  furosemide 20 MG tablet  metoprolol tartrate 25 MG tablet  multivitamin Tabs tablet  oxyCODONE 5 MG immediate release tablet  potassium chloride 20 MEQ extended release tablet       Diet: AHA diet as tolerated    Activity: As instructed on discharge, no lifting more than 10 lbs for one month, no driving while on narcotics. Aerobic activity (walking, climbing stairs, etc.) is encouraged. Wound Care: Clean wounds as instructed on discharge    OFFICE VISIT   ? You should have a follow up appointment in the office in about 1 month after discharge from the hospital.   ?   You may call the office to make an appointment, if you don't have an appointment. ([phone]). ? You will need a chest xray and labs taken around 3-7 days before your follow up appointment. ?   Bring any questions you have so they may be addressed. ? You will need a follow up appointment with you primary care doctor in 7-10 days from discharge, please call and make one if you do not have one.   ? You will need a follow up appointment with you Cardiologist in 2-3 weeks from discharge, please call and make one if you do not have one.   ? BRING YOUR MEDICATION LIST and medications even over the counter medications to all your follow up apointments. ? Office Location:   [address], [address], [facility] SAADKEARA CAPPS OGCHRISTIANO II.Mykel GAYTAN [address]            EMERGENCIES   ? You should either call 911 or go to the Emergency Room to be evaluated if you need seen immediately. ?         Sudden, severe shortness of breath go to the Emergency Room. If you have questions regarding these instructions, please call our office  [phone]. We have an answering service 24 hours a day to get your calls to the ON Call Physician, but we are often in surgery and it takes us awhile to get back to you. Discharge Medications for PCI/MI (performed or attempted): Trop: No results found for: TROPONINT   ASA:                            Yes                      Statin:                          Yes  ACE/ARB:                   No due to hypotension          P2Y12 Inhibitor:          Yes          Beta Blocker:               Yes        Cardiac Rehab:            Yes  Dietary Consult:           Yes           Follow-up:    1   Follow up with Suresh Roman MD 2-3 weeks and with pt's Cardiologist in 3 weeks. 2.  Follow up with SIERRA Stevens in 3-4 weeks, with PA and Lateral CXR, CBC, and BMP. We will restart his home Sotalol after his Amiodarone has been off for 1 week.   The Amiodarone will be used for the first 30 days after discharge from the hospital.  We will also stop his Metoprolol the day his Sotalol is restarted around 5 weeks from now. 3. The pt was agreeable to discharge and future plan of care. All questions were thoroughly answered.        Soheila Stratton PA-C   Electronincally signed 11/15/2022 at 8:15 AM    CC: Uvaldo Puckett MD

## 2022-11-15 NOTE — PROGRESS NOTES
[facility] ICU STEPDOWN TELEMETRY 4K  Occupational Therapy  Daily Note  Time:   Time In: 7  Time Out: 0815  Timed Code Treatment Minutes: 23 Minutes  Minutes: 23          Date: 11/15/2022  Patient Name: Fred Lee,   Gender: male      Room: [facility]16/016-A  MRN: [id number]  : 1945  (78 y.o.)  Referring Practitioner: Dr. Rubens Walters  Diagnosis: abnormal findings on cardiac catheterization  Additional Pertinent Hx: Pt presented for scheduled heart cath on 2022. Per Cardio sx note: 78 y.o. male, s/p multiple PCI to LAD and transapical diagonal, originally presenting with exertional left parasternal, non-radiating chest pain relieved by rest, along with chronic progressive fatigue, underwent attempted PCI today. Procedure terminated with malaligned stent in LAD. Patient symptomatic with unstable angina after the procedure. s/p emergent CABG x 2 on 2022. Restrictions/Precautions:  Restrictions/Precautions: Surgical Protocols, Fall Risk  Position Activity Restriction  Sternal Precautions: No Pushing, No Pulling, 5# Lifting Restrictions  Other position/activity restrictions: s/p CABG x 2 on 2022     SUBJECTIVE: Pt sitting in recliner upon arrival, pt agreeable to OT session, RN gave verbal approval for session, pt's wife present during session. Pt unable to recall sternal precautions this date, with pt educated on during session. PAIN: 4/10: sternal pain    Vitals: Oxygen: 96%  Heart Rate: 98    COGNITION: Slow Processing and Impaired Memory    ADL:   Toileting: Stand By Assistance. For safety  Toilet Transfer: [address] Vc's for precautions with pushing/pulling . BALANCE:  Standing Balance: Contact Guard Assistance. With BUE release from the walker in preparation for functional mob    BED MOBILITY:  Not Tested    TRANSFERS:  Sit to Stand:  Stand By Assistance. Vc's for sternal precautions  Stand to Sit: Stand By Assistance.  Vc's for precautions    FUNCTIONAL MOBILITY:  Assistive Device: Rolling Walker  Assist Level:  Stand By Assistance. Distance:  HH distances with slow steady pace       ADDITIONAL ACTIVITIES:  Pt and wife educated on sternal precautions and help required at home in order to maintain. Pt educated on importance of maintaining sternal precautions during ADL routine with good understanding. ASSESSMENT:     Activity Tolerance:  Patient tolerance of  treatment: good. Discharge Recommendations: Home with Home Health OT  Equipment Recommendations: Other: Monitor pending progress; likely need BSC  Plan: Times Per Week: 6x  Current Treatment Recommendations: Balance training, Self-Care / ADL, Equipment evaluation, education, & procurement, Functional mobility training, Endurance training, Safety education & training    Patient Education  Patient Education: Precautions    Goals  Short Term Goals  Time Frame for Short Term Goals: until discharge  Short Term Goal 1: Pt will complete various sit-stand t/fs with min A x 1 & min vcs for sternal precautions  Short Term Goal 2: Pt will tolerate standing 4-5 min with CGA for increased ease of sinkside grooming  Short Term Goal 3: Pt will complete mobility to bedside chair or BSC with RW, CGA, & 0-2 vcs for safety  Short Term Goal 4: Pt will complete LE dressing with min A & adaptive strategies prn  Short Term Goal 5: Pt will tolerate BUE step 2 cardiac exercises x 10 reps with min RBs to increase endurance for BADL  Long Term Goals  Time Frame for Long Term Goals : No LTG set d/t short ELOS    Following session, patient left in safe position with all fall risk precautions in place.

## 2022-11-15 NOTE — CARE COORDINATION
11/15/22, 8:11 AM EST    Patient goals/plan/ treatment preferences discussed by  and . Patient goals/plan/ treatment preferences reviewed with patient/ family. Patient/ family verbalize understanding of discharge plan and are in agreement with goal/plan/treatment preferences. Understanding was demonstrated using the teach back method. AVS provided by RN at time of discharge, which includes all necessary medical information pertaining to the patients current course of illness, treatment, post-discharge goals of care, and treatment preferences. Services At/After Discharge: Home Health       IMM Letter  IMM Letter given to Patient/Family/Significant other/Guardian/POA/by[de-identified] - Jelena Goodman  IMM Letter date given[de-identified] 11/09/22  IMM Letter time given[de-identified] 56     Planning home with wife, new Biju Flores for RN/PT/OT. Lavinia notified of discharge. SW faxed updated notes and HH order.

## 2022-11-16 ENCOUNTER — TELEPHONE (OUTPATIENT)
Dept: CARDIOTHORACIC SURGERY | Age: 77
End: 2022-11-16

## 2022-11-17 ENCOUNTER — HOSPITAL ENCOUNTER (INPATIENT)
Age: 77
LOS: 1 days | Discharge: HOME HEALTH | DRG: 178 | End: 2022-11-18
Attending: STUDENT IN AN ORGANIZED HEALTH CARE EDUCATION/TRAINING PROGRAM | Admitting: INTERNAL MEDICINE
Payer: MEDICARE

## 2022-11-17 PROBLEM — U07.1 COVID: Status: ACTIVE | Noted: 2022-11-17

## 2022-11-17 LAB
ANION GAP SERPL CALCULATED.3IONS-SCNC: 11 MEQ/L (ref 8–16)
BUN BLDV-MCNC: 16 MG/DL (ref 7–22)
CALCIUM SERPL-MCNC: 8.3 MG/DL (ref 8.5–10.5)
CHLORIDE BLD-SCNC: 104 MEQ/L (ref 98–111)
CO2: 21 MEQ/L (ref 23–33)
CREAT SERPL-MCNC: 1.1 MG/DL (ref 0.4–1.2)
ERYTHROCYTE [DISTWIDTH] IN BLOOD BY AUTOMATED COUNT: 14.9 % (ref 11.5–14.5)
ERYTHROCYTE [DISTWIDTH] IN BLOOD BY AUTOMATED COUNT: 52.9 FL (ref 35–45)
GFR SERPL CREATININE-BSD FRML MDRD: > 60 ML/MIN/1.73M2
GLUCOSE BLD-MCNC: 120 MG/DL (ref 70–108)
HCT VFR BLD CALC: 25.3 % (ref 42–52)
HEMOGLOBIN: 8.3 GM/DL (ref 14–18)
MCH RBC QN AUTO: 32.7 PG (ref 26–33)
MCHC RBC AUTO-ENTMCNC: 32.8 GM/DL (ref 32.2–35.5)
MCV RBC AUTO: 99.6 FL (ref 80–94)
MRSA NASAL SCREEN RT-PCR: NEGATIVE
PLATELET # BLD: 319 THOU/MM3 (ref 130–400)
PMV BLD AUTO: 9.6 FL (ref 9.4–12.4)
POTASSIUM SERPL-SCNC: 3.7 MEQ/L (ref 3.5–5.2)
RBC # BLD: 2.54 MILL/MM3 (ref 4.7–6.1)
SARS-COV-2: DETECTED
SODIUM BLD-SCNC: 136 MEQ/L (ref 135–145)
STAPH AUREUS SCREEN RT-PCR: NEGATIVE
VANCOMYCIN RESISTANT ENTEROCOCCUS: NEGATIVE
WBC # BLD: 8.7 THOU/MM3 (ref 4.8–10.8)

## 2022-11-17 PROCEDURE — 87500 VANOMYCIN DNA AMP PROBE: CPT

## 2022-11-17 PROCEDURE — 87640 STAPH A DNA AMP PROBE: CPT

## 2022-11-17 PROCEDURE — 80048 BASIC METABOLIC PNL TOTAL CA: CPT

## 2022-11-17 PROCEDURE — 87641 MR-STAPH DNA AMP PROBE: CPT

## 2022-11-17 PROCEDURE — 85027 COMPLETE CBC AUTOMATED: CPT

## 2022-11-17 PROCEDURE — 94761 N-INVAS EAR/PLS OXIMETRY MLT: CPT

## 2022-11-17 PROCEDURE — 2580000003 HC RX 258

## 2022-11-17 PROCEDURE — 2060000000 HC ICU INTERMEDIATE R&B

## 2022-11-17 PROCEDURE — 36415 COLL VENOUS BLD VENIPUNCTURE: CPT

## 2022-11-17 PROCEDURE — 94640 AIRWAY INHALATION TREATMENT: CPT

## 2022-11-17 PROCEDURE — 94669 MECHANICAL CHEST WALL OSCILL: CPT

## 2022-11-17 PROCEDURE — 6370000000 HC RX 637 (ALT 250 FOR IP)

## 2022-11-17 PROCEDURE — 6360000002 HC RX W HCPCS

## 2022-11-17 RX ORDER — SODIUM CHLORIDE 9 MG/ML
INJECTION, SOLUTION INTRAVENOUS PRN
Status: DISCONTINUED | OUTPATIENT
Start: 2022-11-17 | End: 2022-11-18 | Stop reason: HOSPADM

## 2022-11-17 RX ORDER — POLYETHYLENE GLYCOL 3350 17 G/17G
17 POWDER, FOR SOLUTION ORAL DAILY PRN
Status: DISCONTINUED | OUTPATIENT
Start: 2022-11-17 | End: 2022-11-18 | Stop reason: HOSPADM

## 2022-11-17 RX ORDER — PRAVASTATIN SODIUM 80 MG/1
80 TABLET ORAL NIGHTLY
Status: DISCONTINUED | OUTPATIENT
Start: 2022-11-17 | End: 2022-11-18 | Stop reason: HOSPADM

## 2022-11-17 RX ORDER — PREGABALIN 50 MG/1
100 CAPSULE ORAL 3 TIMES DAILY
Status: DISCONTINUED | OUTPATIENT
Start: 2022-11-17 | End: 2022-11-18 | Stop reason: HOSPADM

## 2022-11-17 RX ORDER — POTASSIUM CHLORIDE 20 MEQ/1
20 TABLET, EXTENDED RELEASE ORAL DAILY
Status: DISCONTINUED | OUTPATIENT
Start: 2022-11-17 | End: 2022-11-18 | Stop reason: HOSPADM

## 2022-11-17 RX ORDER — ASPIRIN 81 MG/1
81 TABLET ORAL NIGHTLY
Status: DISCONTINUED | OUTPATIENT
Start: 2022-11-17 | End: 2022-11-18 | Stop reason: HOSPADM

## 2022-11-17 RX ORDER — ACETAMINOPHEN 650 MG/1
650 SUPPOSITORY RECTAL EVERY 6 HOURS PRN
Status: DISCONTINUED | OUTPATIENT
Start: 2022-11-17 | End: 2022-11-18 | Stop reason: HOSPADM

## 2022-11-17 RX ORDER — IPRATROPIUM BROMIDE AND ALBUTEROL SULFATE 2.5; .5 MG/3ML; MG/3ML
1 SOLUTION RESPIRATORY (INHALATION) EVERY 4 HOURS PRN
Status: DISCONTINUED | OUTPATIENT
Start: 2022-11-17 | End: 2022-11-17

## 2022-11-17 RX ORDER — ACETAMINOPHEN 325 MG/1
650 TABLET ORAL EVERY 6 HOURS PRN
Status: DISCONTINUED | OUTPATIENT
Start: 2022-11-17 | End: 2022-11-18 | Stop reason: HOSPADM

## 2022-11-17 RX ORDER — TAMSULOSIN HYDROCHLORIDE 0.4 MG/1
0.4 CAPSULE ORAL NIGHTLY
Status: DISCONTINUED | OUTPATIENT
Start: 2022-11-17 | End: 2022-11-18 | Stop reason: HOSPADM

## 2022-11-17 RX ORDER — ONDANSETRON 4 MG/1
4 TABLET, ORALLY DISINTEGRATING ORAL EVERY 8 HOURS PRN
Status: DISCONTINUED | OUTPATIENT
Start: 2022-11-17 | End: 2022-11-18 | Stop reason: HOSPADM

## 2022-11-17 RX ORDER — FUROSEMIDE 20 MG/1
20 TABLET ORAL DAILY
Status: DISCONTINUED | OUTPATIENT
Start: 2022-11-17 | End: 2022-11-18 | Stop reason: HOSPADM

## 2022-11-17 RX ORDER — AMIODARONE HYDROCHLORIDE 200 MG/1
200 TABLET ORAL DAILY
Status: DISCONTINUED | OUTPATIENT
Start: 2022-11-17 | End: 2022-11-18 | Stop reason: HOSPADM

## 2022-11-17 RX ORDER — DEXTROSE MONOHYDRATE 100 MG/ML
INJECTION, SOLUTION INTRAVENOUS CONTINUOUS PRN
Status: DISCONTINUED | OUTPATIENT
Start: 2022-11-17 | End: 2022-11-18 | Stop reason: HOSPADM

## 2022-11-17 RX ORDER — ENOXAPARIN SODIUM 100 MG/ML
30 INJECTION SUBCUTANEOUS 2 TIMES DAILY
Status: DISCONTINUED | OUTPATIENT
Start: 2022-11-17 | End: 2022-11-18 | Stop reason: HOSPADM

## 2022-11-17 RX ORDER — ONDANSETRON 2 MG/ML
4 INJECTION INTRAMUSCULAR; INTRAVENOUS EVERY 6 HOURS PRN
Status: DISCONTINUED | OUTPATIENT
Start: 2022-11-17 | End: 2022-11-18 | Stop reason: HOSPADM

## 2022-11-17 RX ORDER — SODIUM CHLORIDE 0.9 % (FLUSH) 0.9 %
5-40 SYRINGE (ML) INJECTION EVERY 12 HOURS SCHEDULED
Status: DISCONTINUED | OUTPATIENT
Start: 2022-11-17 | End: 2022-11-18 | Stop reason: HOSPADM

## 2022-11-17 RX ORDER — OXYBUTYNIN CHLORIDE 5 MG/1
5 TABLET ORAL NIGHTLY
Status: DISCONTINUED | OUTPATIENT
Start: 2022-11-17 | End: 2022-11-18 | Stop reason: HOSPADM

## 2022-11-17 RX ORDER — CLOPIDOGREL BISULFATE 75 MG/1
75 TABLET ORAL DAILY
Status: DISCONTINUED | OUTPATIENT
Start: 2022-11-17 | End: 2022-11-18 | Stop reason: HOSPADM

## 2022-11-17 RX ORDER — POTASSIUM CHLORIDE 20 MEQ/1
40 TABLET, EXTENDED RELEASE ORAL PRN
Status: DISCONTINUED | OUTPATIENT
Start: 2022-11-17 | End: 2022-11-18 | Stop reason: HOSPADM

## 2022-11-17 RX ORDER — SODIUM CHLORIDE 0.9 % (FLUSH) 0.9 %
5-40 SYRINGE (ML) INJECTION PRN
Status: DISCONTINUED | OUTPATIENT
Start: 2022-11-17 | End: 2022-11-18 | Stop reason: HOSPADM

## 2022-11-17 RX ORDER — MAGNESIUM SULFATE IN WATER 40 MG/ML
2000 INJECTION, SOLUTION INTRAVENOUS PRN
Status: DISCONTINUED | OUTPATIENT
Start: 2022-11-17 | End: 2022-11-18 | Stop reason: HOSPADM

## 2022-11-17 RX ORDER — POTASSIUM CHLORIDE 7.45 MG/ML
10 INJECTION INTRAVENOUS PRN
Status: DISCONTINUED | OUTPATIENT
Start: 2022-11-17 | End: 2022-11-18 | Stop reason: HOSPADM

## 2022-11-17 RX ORDER — IPRATROPIUM BROMIDE AND ALBUTEROL SULFATE 2.5; .5 MG/3ML; MG/3ML
1 SOLUTION RESPIRATORY (INHALATION) EVERY 4 HOURS PRN
Status: DISCONTINUED | OUTPATIENT
Start: 2022-11-17 | End: 2022-11-18 | Stop reason: HOSPADM

## 2022-11-17 RX ORDER — GUAIFENESIN/DEXTROMETHORPHAN 100-10MG/5
5 SYRUP ORAL EVERY 4 HOURS PRN
Status: DISCONTINUED | OUTPATIENT
Start: 2022-11-17 | End: 2022-11-18 | Stop reason: HOSPADM

## 2022-11-17 RX ORDER — PANTOPRAZOLE SODIUM 40 MG/1
40 TABLET, DELAYED RELEASE ORAL
Status: DISCONTINUED | OUTPATIENT
Start: 2022-11-18 | End: 2022-11-18 | Stop reason: HOSPADM

## 2022-11-17 RX ORDER — DULOXETIN HYDROCHLORIDE 60 MG/1
60 CAPSULE, DELAYED RELEASE ORAL DAILY
Status: DISCONTINUED | OUTPATIENT
Start: 2022-11-17 | End: 2022-11-18 | Stop reason: HOSPADM

## 2022-11-17 RX ORDER — IPRATROPIUM BROMIDE AND ALBUTEROL SULFATE 2.5; .5 MG/3ML; MG/3ML
1 SOLUTION RESPIRATORY (INHALATION) 2 TIMES DAILY
Status: DISCONTINUED | OUTPATIENT
Start: 2022-11-18 | End: 2022-11-18

## 2022-11-17 RX ADMIN — IPRATROPIUM BROMIDE AND ALBUTEROL SULFATE 1 AMPULE: .5; 3 SOLUTION RESPIRATORY (INHALATION) at 17:43

## 2022-11-17 RX ADMIN — SODIUM CHLORIDE, PRESERVATIVE FREE 10 ML: 5 INJECTION INTRAVENOUS at 11:57

## 2022-11-17 RX ADMIN — ASPIRIN 81 MG: 81 TABLET, COATED ORAL at 22:23

## 2022-11-17 RX ADMIN — OXYBUTYNIN CHLORIDE 5 MG: 5 TABLET ORAL at 22:12

## 2022-11-17 RX ADMIN — DULOXETINE HYDROCHLORIDE 60 MG: 60 CAPSULE, DELAYED RELEASE ORAL at 11:58

## 2022-11-17 RX ADMIN — ACETAMINOPHEN 650 MG: 325 TABLET ORAL at 18:04

## 2022-11-17 RX ADMIN — PRAVASTATIN SODIUM 80 MG: 80 TABLET ORAL at 22:12

## 2022-11-17 RX ADMIN — FUROSEMIDE 20 MG: 20 TABLET ORAL at 11:58

## 2022-11-17 RX ADMIN — METOPROLOL TARTRATE 25 MG: 25 TABLET, FILM COATED ORAL at 11:58

## 2022-11-17 RX ADMIN — ENOXAPARIN SODIUM 30 MG: 100 INJECTION SUBCUTANEOUS at 13:17

## 2022-11-17 RX ADMIN — PREGABALIN 100 MG: 50 CAPSULE ORAL at 11:58

## 2022-11-17 RX ADMIN — CLOPIDOGREL BISULFATE 75 MG: 75 TABLET ORAL at 13:17

## 2022-11-17 RX ADMIN — PREGABALIN 100 MG: 50 CAPSULE ORAL at 22:24

## 2022-11-17 RX ADMIN — METOPROLOL TARTRATE 25 MG: 25 TABLET, FILM COATED ORAL at 22:12

## 2022-11-17 RX ADMIN — ENOXAPARIN SODIUM 30 MG: 100 INJECTION SUBCUTANEOUS at 22:24

## 2022-11-17 RX ADMIN — TAMSULOSIN HYDROCHLORIDE 0.4 MG: 0.4 CAPSULE ORAL at 22:12

## 2022-11-17 RX ADMIN — POTASSIUM CHLORIDE 20 MEQ: 1500 TABLET, EXTENDED RELEASE ORAL at 17:21

## 2022-11-17 RX ADMIN — AMIODARONE HYDROCHLORIDE 200 MG: 200 TABLET ORAL at 11:58

## 2022-11-17 ASSESSMENT — PAIN DESCRIPTION - LOCATION
LOCATION: CHEST

## 2022-11-17 ASSESSMENT — PAIN - FUNCTIONAL ASSESSMENT
PAIN_FUNCTIONAL_ASSESSMENT: PREVENTS OR INTERFERES SOME ACTIVE ACTIVITIES AND ADLS
PAIN_FUNCTIONAL_ASSESSMENT: ACTIVITIES ARE NOT PREVENTED
PAIN_FUNCTIONAL_ASSESSMENT: PREVENTS OR INTERFERES SOME ACTIVE ACTIVITIES AND ADLS

## 2022-11-17 ASSESSMENT — PAIN SCALES - GENERAL
PAINLEVEL_OUTOF10: 4
PAINLEVEL_OUTOF10: 3
PAINLEVEL_OUTOF10: 0
PAINLEVEL_OUTOF10: 3
PAINLEVEL_OUTOF10: 5
PAINLEVEL_OUTOF10: 0
PAINLEVEL_OUTOF10: 3

## 2022-11-17 ASSESSMENT — PAIN DESCRIPTION - FREQUENCY
FREQUENCY: CONTINUOUS

## 2022-11-17 ASSESSMENT — PAIN DESCRIPTION - DESCRIPTORS
DESCRIPTORS: ACHING;NAGGING
DESCRIPTORS: ACHING;NAGGING
DESCRIPTORS: NAGGING;ACHING
DESCRIPTORS: ACHING;NAGGING
DESCRIPTORS: ACHING

## 2022-11-17 ASSESSMENT — PAIN DESCRIPTION - PAIN TYPE
TYPE: SURGICAL PAIN

## 2022-11-17 ASSESSMENT — PAIN DESCRIPTION - ORIENTATION
ORIENTATION: MID;UPPER
ORIENTATION: MID
ORIENTATION: MID
ORIENTATION: MID;UPPER
ORIENTATION: MID;UPPER

## 2022-11-17 ASSESSMENT — PAIN DESCRIPTION - ONSET: ONSET: ON-GOING

## 2022-11-17 NOTE — PROGRESS NOTES
Admission completed with patient and patients significant other, Mook Roth. Patient cooperative, alert and oriented. Virtual nurse services explained to patient. Patient agrees to services. Message sent to bedside nurse that admission is complete.

## 2022-11-17 NOTE — PROGRESS NOTES
Transfer  Report from [facility]  Referring Physician Dr Itzel Godfrey  Accepting Physician 77 yo at [facility] in [place]. Patient was an emergent open heart here last week. Today he has increasing SOB and tested positive for Covid. Most of his labs are normal with the exception of his trop which was 376, now down to 295. EKG was negative. D-dimer slightly elevated. CT shows some pulmonary edema and left pleural effusion. Vitals 98.4 HR 67 Resp 21 b/p 104/51, 100% on RA.        Accepted on behalf of Dr Mary Jo Coburn to 4K 10 with diagnosis of  Covid

## 2022-11-17 NOTE — PLAN OF CARE
Problem: Respiratory - Adult  Goal: Achieves optimal ventilation and oxygenation  Outcome: Progressing   Breath sounds diminished, continuing duoneb per protocol. Patient remains on room air. Patient mutually agreed on goals.

## 2022-11-17 NOTE — FLOWSHEET NOTE
11/17/22 3322   Safe Environment   Safety Measures Other (comment)  (Virtual nurse round complete)   Virtual nurse introduced via audio. Patient permits camera. Admission completed with patient at this time, please see previous note. Visitor at bedside. Call light within reach. Denies any needs at this time.

## 2022-11-17 NOTE — H&P
Internal Medicine Resident History and Physical          Patient: Corrine Pierson  :   MRN: [id number]     Acct: [de-identified]    PCP: Geronimo Wilson MD  Date of Admission: 2022  Date of Service: Pt seen/examined on 22  and Admitted to Inpatient with expected LOS greater than two midnights due to medical therapy. Hospital Problems             Last Modified POA    * (Principal) COVID 2022 Yes       Assessment and Plan:  COVID-19 infection  Patient was found to be COVID-19 positive at [place] ED on . Patient is currently afebrile and does not required supplemental oxygenation. Patient's only complaint is SOB with exertion. Patient warrants admission due to recent CABG on  which makes patient have potential for decompensation. No dexamethasone required at this time due to patient being on room air.  -DuoNebs every 4 hours as needed  -Continue monitor need for supplemental oxygen, goal SPO2 > 90%  -Robitussin every 4 hours as needed for cough  -Acapella/incentive spirometry  -Droplet plus isolation  -Two-view chest x-ray in a.m. to assess pleural effusions from previous hospitalization  -Pulse ox checks while ambulating in a.m. CAD s/p recent CABG on 22  Patient has previously had multiple PCI to LAD and transapical diagonal.  On  PCI was initially attempted, however procedure was terminated with malignant stent in the LAD. Uncomplicated CABG was then performed. Patient had an unremarkable and progressive convalescence without adverse events. At this time patient denies any chest pain.  -Continue home aspirin 81 mg  -Continue home Plavix 75 mg  -Consider cardiothoracic surgery consult if patient begins to have complications from recent CABG    Paroxysmal atrial fibrillation  Patient is not on any [facility] at home. Patient is rhythm controlled with amiodarone.   Patient is rate controlled with Lopressor.  -Continue home amiodarone 200 mg  -Continue home Lopressor 25 mg twice daily  -Telemetry    Essential hypertension, controlled  -Continue home Lopressor  -Continue home Lasix  -Continue home KCl 20 mEq, due to diuretic use    Hyperlipidemia  -Continue home pravastatin 80 mg    BPH  -Continue home tamsulosin 0.4 mg  -Continue home oxybutynin 5 mg    Neuropathy  -Continue home Cymbalta 60 mg  -Continue home Lyrica 100 mg 3 times daily    Anemia, unspecified type  Patient's initial Hgb 8.3. This seems at approximately patient's baseline. No signs of bleeding noted. -Monitor CBC  -Transfuse if Hgb < 7 or symptomatic    GERD  -Continue home Protonix        =======================================================================      Chief Complaint: SOB    History Of Present Illness:  Mohini Woodward is a 78 y.o. male with PMHx of CAD s/p recent CABG, paroxysmal atrial fibrillation, HTN, HLD, BPH, neuropathy, anemia, GERD who presents to [facility] as a direct admission from [facility] ED for shortness of breath and COVID-19 infection. Patient states that his shortness of breath began evening prior to admission at approximately 10 to 11 PM.  Patient states that he was walking that he \"could not get enough air into his lungs\". Patient states that he had difficulties with calling for help at that point as well due to \"not having enough air in his lungs\". Patient's wife states the patient had a temperature of 100.7 °F on 11/15. Patient is currently afebrile. Patient states that he currently has no cough, however he does try to \"clear his lungs\" to get rid of phlegm from his recent surgery. Patient states that he continues have shortness of breath when lying flat. Patient states that after recent surgery he has been unable to lay flat \"sometimes\". Patient states that he believes he would feel better if he \"got some good sleep\". Patient also states that he received both doses of his COVID 19 and no boosters.   Patient and his wife are both concerned due to patient previously having pleural effusions. At time of discharge from hospitalization for CABG procedure, patient's wife said they were told that patient had small pleural effusion which \"would need drained if it got worse\". Patient initially presented to [facility] ED, however due to patient having recent CABG procedure at [facility], he was transferred to this facility. At [facility] ED patient was found to be COVID-19 positive. Patient is admitted to stepdown unit for further management of COVID-19 infection in the setting of recent CABG on 11/8/2022. Past Medical History:        Diagnosis Date    Anxiety     Bradycardia 4/8/2016    Heart disease     Hyperlipidemia     Hypertension     [employer] linq loop recorder 4/8/2016    Neuropathy     Paroxysmal atrial fibrillation ([address] 9/23/2015    Spondylolisthesis     Syncopal episodes 9/23/2015       Past Surgical History:        Procedure Laterality Date    ANGIOPLASTY  07' 10'    x5    BACK SURGERY  2008    CATARACT REMOVAL  05/04/2021    CHOLECYSTECTOMY      CORONARY ANGIOPLASTY WITH STENT PLACEMENT  [phone]    CORONARY ARTERY BYPASS GRAFT N/A 11/8/2022    CABG x2 RAYA OFF PUMP performed by Shiloh Orozco MD at [place] [address] ARTHROSCOPY      TONSILLECTOMY      TUR  2015       Medications Prior to Admission:   Prior to Admission medications    Medication Sig Start Date End Date Taking? Authorizing Provider   oxyCODONE (ROXICODONE) 5 MG immediate release tablet Take 1 tablet by mouth every 8 hours as needed for Pain for up to 7 days.  11/15/22 11/22/22  Horacio Avila PA-C   metoprolol tartrate (LOPRESSOR) 25 MG tablet Take 1 tablet by mouth 2 times daily 11/15/22   Horacio Avila PA-C   Multiple Vitamin (MULTIVITAMIN) TABS tablet Take 1 tablet by mouth daily (with breakfast)  Patient not taking: Reported on 11/17/2022 11/15/22   Horacio Avila PA-C furosemide (LASIX) 20 MG tablet Take 1 tablet by mouth daily 11/15/22   Betina Sanon PA-C   potassium chloride (KLOR-CON M) 20 MEQ extended release tablet Take 1 tablet by mouth daily 11/15/22   Betina Sanon PA-C   amiodarone (CORDARONE) 200 MG tablet Take 1 tablet by mouth daily 11/15/22   Betina Sanon PA-C   aspirin EC 81 MG EC tablet Take 1 tablet by mouth daily 11/15/22   Betina Sanon PA-C   clopidogrel (PLAVIX) 75 MG tablet Take 1 tablet by mouth daily 11/15/22   Betina Sanon PA-C   pantoprazole sodium (PROTONIX) 40 MG PACK packet Take 40 mg by mouth every morning (before breakfast)    Historical Provider, MD   pravastatin (PRAVACHOL) 80 MG tablet Take 1 tablet by mouth once daily 10/19/22   Elena Sandoval MD   tadalafil (CIALIS) 10 MG tablet Take 1 tablet by mouth daily as needed for Erectile Dysfunction  Patient not taking: No sig reported 10/21/20   Elena Sandoval MD   pregabalin (LYRICA) 50 MG capsule Take 100 mg by mouth 3 times daily. Historical Provider, MD   Cyanocobalamin (VITAMIN B 12 PO) Take 1,000 mg by mouth daily    Historical Provider, MD   nitroGLYCERIN (NITROSTAT) 0.4 MG SL tablet Place 1 tablet under the tongue every 5 minutes as needed (PRN)  Patient not taking: Reported on 11/17/2022 3/15/19   Elena Sandoval MD   DULoxetine (CYMBALTA) 30 MG extended release capsule Take 60 mg by mouth daily     Historical Provider, MD   oxybutynin (DITROPAN) 5 MG tablet Take 5 mg by mouth nightly    Historical Provider, MD   tamsulosin (FLOMAX) 0.4 MG capsule Take 0.4 mg by mouth daily    Historical Provider, MD   rOPINIRole (REQUIP) 0.5 MG tablet Take 0.5 mg by mouth as needed (restless leg syndrome)   Patient not taking: No sig reported    Historical Provider, MD   esomeprazole (NEXIUM) 40 MG capsule Take 40 mg by mouth daily   Patient not taking: Reported on 11/17/2022    Historical Provider, MD   tizanidine (ZANAFLEX) 4 MG tablet Take 4 mg by mouth every 6 hours as needed. Patient not taking: No sig reported    Historical Provider, MD   fish oil-omega-3 fatty acids 1000 MG capsule Take 1 g by mouth daily     Historical Provider, MD       Allergies:  Patient has no known allergies. Social History:    The patient currently lives at home. Tobacco use:   reports that he quit smoking about 22 years ago. His smoking use included cigarettes. He has a 75.00 pack-year smoking history. He has never used smokeless tobacco.  Alcohol use:   reports current alcohol use. Drug use:  reports no history of drug use. Family History:  as follows:      Problem Relation Age of Onset    Heart Disease Mother 62        CABG    High Cholesterol Mother     Cancer Mother     Heart Disease Father 61        stents    Cancer Father     Cancer Brother     Asthma Brother        Review of Systems:   Pertinent positives and negatives as noted in the HPI. Otherwise complete ROS negative. Physical Exam:    BP (!) 93/50   Pulse 76   Temp 97.9 °F (36.6 °C) (Oral)   Resp 18   Ht 5' 6\" (1.676 m)   Wt 229 lb (103.9 kg)   SpO2 95%   BMI 36.96 kg/m²       General appearance: No acute distress, appears stated age. Eyes:  Pupils equal, round, and reactive to light. Conjunctivae/corneas clear. HENT: Head normal in appearance. External nares normal.  Oral mucosa moist without lesions. Hearing grossly intact. Neck: Supple, with full range of motion. Trachea midline. No gross JVD appreciated. Respiratory:  Normal respiratory effort. Clear to auscultation, bilaterally without rales or wheezes or rhonchi. Cardiovascular: Normal rate, regular rhythm with normal S1/S2 without murmurs. No lower extremity edema. +1 lower extremity pitting edema. Healing surgical incision noted on chest  Abdomen: Soft, non-tender, non-distended with normal bowel sounds. Musculoskeletal: No joint swelling or tenderness. Normal tone. No abnormal movements. Skin: Warm and dry. No rashes or lesions.   Neurologic:  No focal sensory/motor deficits in the upper and lower extremities. Cranial nerves:  grossly non-focal 2-12. Psychiatric: Alert and oriented, normal insight and thought content. Capillary Refill: Brisk,< 3 seconds. Peripheral Pulses: +2 palpable, equal bilaterally. Labs:     Recent Labs     11/15/22  0331 11/17/22  0932   WBC 10.4 8.7   HGB 9.0* 8.3*   HCT 27.6* 25.3*    319     Recent Labs     11/14/22  2105 11/15/22  0331 11/17/22  0932   NA  --  140 136   K 4.1 4.8 3.7   CL  --  106 104   CO2  --  26 21*   BUN  --  21 16   CREATININE  --  1.1 1.1   CALCIUM  --  8.4* 8.3*     No results for input(s): AST, ALT, BILIDIR, BILITOT, ALKPHOS in the last 72 hours. No results for input(s): INR in the last 72 hours. No results for input(s): TROPONINT in the last 72 hours. No results for input(s): PROCAL in the last 72 hours. Lab Results   Component Value Date/Time    NITRU NEGATIVE 11/11/2022 11:20 AM    WBCUA 5-9 11/11/2022 11:20 AM    BACTERIA FEW 11/11/2022 11:20 AM    RBCUA 15-20 11/11/2022 11:20 AM    BLOODU LARGE 11/11/2022 11:20 AM    SPECGRAV >1.030 11/11/2022 11:20 AM         Radiology:     XR COMPARISON OF OUTSIDE FILMS   Final Result      CT COMPARISON OF OUTSIDE FILMS    (Results Pending)          PT/OT Eval Status: will be assessed  Diet: ADULT DIET; Regular; Low Fat/Low Chol/High Fiber/2 gm Na  DVT prophylaxis: Lovenox  Code Status: Full Code  Disposition: admit to stepdown    Thank you Gurpreet Baker MD for the opportunity to be involved in this patient's care.     Electronically signed by Darnell Salazar DO on 11/17/2022 at 4:06 PM.     Case discussed with Attending, Dr. Ulysses Nunes DO.

## 2022-11-17 NOTE — RT PROTOCOL NOTE
RT Inhaler-Nebulizer Bronchodilator Protocol Note    There is a bronchodilator order in the chart from a provider indicating to follow the RT Bronchodilator Protocol and there is an Initiate RT Inhaler-Nebulizer Bronchodilator Protocol order as well (see protocol at bottom of note). CXR Findings:  No results found. The findings from the last RT Protocol Assessment were as follows:   History Pulmonary Disease: Smoker 15 pack years or more  Respiratory Pattern: Regular pattern and RR 12-20 bpm  Breath Sounds: Slightly diminished and/or crackles  Cough: Strong, productive  Indication for Bronchodilator Therapy: Decreased or absent breath sounds  Bronchodilator Assessment Score: 4    Aerosolized bronchodilator medication orders have been revised according to the RT Inhaler-Nebulizer Bronchodilator Protocol below. Respiratory Therapist to perform RT Therapy Protocol Assessment initially then follow the protocol. Repeat RT Therapy Protocol Assessment PRN for score 0-3 or on second treatment, BID, and PRN for scores above 3. No Indications - adjust the frequency to every 6 hours PRN wheezing or bronchospasm, if no treatments needed after 48 hours then discontinue using Per Protocol order mode. If indication present, adjust the RT bronchodilator orders based on the Bronchodilator Assessment Score as indicated below. Use Inhaler orders unless patient has one or more of the following: on home nebulizer, not able to hold breath for 10 seconds, is not alert and oriented, cannot activate and use MDI correctly, or respiratory rate 25 breaths per minute or more, then use the equivalent nebulizer order(s) with same Frequency and PRN reasons based on the score. If a patient is on this medication at home then do not decrease Frequency below that used at home.     0-3 - enter or revise RT bronchodilator order(s) to equivalent RT Bronchodilator order with Frequency of every 4 hours PRN for wheezing or increased work of breathing using Per Protocol order mode. 4-6 - enter or revise RT Bronchodilator order(s) to two equivalent RT bronchodilator orders with one order with BID Frequency and one order with Frequency of every 4 hours PRN wheezing or increased work of breathing using Per Protocol order mode. 7-10 - enter or revise RT Bronchodilator order(s) to two equivalent RT bronchodilator orders with one order with TID Frequency and one order with Frequency of every 4 hours PRN wheezing or increased work of breathing using Per Protocol order mode. 11-13 - enter or revise RT Bronchodilator order(s) to one equivalent RT bronchodilator order with QID Frequency and an Albuterol order with Frequency of every 4 hours PRN wheezing or increased work of breathing using Per Protocol order mode. Greater than 13 - enter or revise RT Bronchodilator order(s) to one equivalent RT bronchodilator order with every 4 hours Frequency and an Albuterol order with Frequency of every 2 hours PRN wheezing or increased work of breathing using Per Protocol order mode. RT to enter RT Home Evaluation for COPD & MDI Assessment order using Per Protocol order mode.     Electronically signed by Mindy Bowman RCP on 11/17/2022 at 5:48 PM

## 2022-11-18 ENCOUNTER — APPOINTMENT (OUTPATIENT)
Dept: GENERAL RADIOLOGY | Age: 77
DRG: 178 | End: 2022-11-18
Attending: STUDENT IN AN ORGANIZED HEALTH CARE EDUCATION/TRAINING PROGRAM
Payer: MEDICARE

## 2022-11-18 VITALS
BODY MASS INDEX: 36.8 KG/M2 | RESPIRATION RATE: 20 BRPM | SYSTOLIC BLOOD PRESSURE: 120 MMHG | WEIGHT: 229 LBS | OXYGEN SATURATION: 95 % | HEIGHT: 66 IN | TEMPERATURE: 98.3 F | HEART RATE: 83 BPM | DIASTOLIC BLOOD PRESSURE: 61 MMHG

## 2022-11-18 LAB
ANION GAP SERPL CALCULATED.3IONS-SCNC: 15 MEQ/L (ref 8–16)
BUN BLDV-MCNC: 14 MG/DL (ref 7–22)
CALCIUM SERPL-MCNC: 8.6 MG/DL (ref 8.5–10.5)
CHLORIDE BLD-SCNC: 105 MEQ/L (ref 98–111)
CO2: 19 MEQ/L (ref 23–33)
CREAT SERPL-MCNC: 1.2 MG/DL (ref 0.4–1.2)
ERYTHROCYTE [DISTWIDTH] IN BLOOD BY AUTOMATED COUNT: 15.3 % (ref 11.5–14.5)
ERYTHROCYTE [DISTWIDTH] IN BLOOD BY AUTOMATED COUNT: 54 FL (ref 35–45)
GFR SERPL CREATININE-BSD FRML MDRD: > 60 ML/MIN/1.73M2
GLUCOSE BLD-MCNC: 101 MG/DL (ref 70–108)
HCT VFR BLD CALC: 26 % (ref 42–52)
HEMOGLOBIN: 8.3 GM/DL (ref 14–18)
MCH RBC QN AUTO: 31.9 PG (ref 26–33)
MCHC RBC AUTO-ENTMCNC: 31.9 GM/DL (ref 32.2–35.5)
MCV RBC AUTO: 100 FL (ref 80–94)
PLATELET # BLD: 356 THOU/MM3 (ref 130–400)
PMV BLD AUTO: 9.8 FL (ref 9.4–12.4)
POTASSIUM SERPL-SCNC: 3.4 MEQ/L (ref 3.5–5.2)
RBC # BLD: 2.6 MILL/MM3 (ref 4.7–6.1)
SODIUM BLD-SCNC: 139 MEQ/L (ref 135–145)
VITAMIN D 25-HYDROXY: 17 NG/ML (ref 30–100)
WBC # BLD: 7.8 THOU/MM3 (ref 4.8–10.8)

## 2022-11-18 PROCEDURE — 6360000002 HC RX W HCPCS

## 2022-11-18 PROCEDURE — 6370000000 HC RX 637 (ALT 250 FOR IP)

## 2022-11-18 PROCEDURE — 94640 AIRWAY INHALATION TREATMENT: CPT

## 2022-11-18 PROCEDURE — 82306 VITAMIN D 25 HYDROXY: CPT

## 2022-11-18 PROCEDURE — 80048 BASIC METABOLIC PNL TOTAL CA: CPT

## 2022-11-18 PROCEDURE — 36415 COLL VENOUS BLD VENIPUNCTURE: CPT

## 2022-11-18 PROCEDURE — 94669 MECHANICAL CHEST WALL OSCILL: CPT

## 2022-11-18 PROCEDURE — 2580000003 HC RX 258

## 2022-11-18 PROCEDURE — 71046 X-RAY EXAM CHEST 2 VIEWS: CPT

## 2022-11-18 PROCEDURE — 6370000000 HC RX 637 (ALT 250 FOR IP): Performed by: STUDENT IN AN ORGANIZED HEALTH CARE EDUCATION/TRAINING PROGRAM

## 2022-11-18 PROCEDURE — 85027 COMPLETE CBC AUTOMATED: CPT

## 2022-11-18 RX ORDER — OXYCODONE HYDROCHLORIDE 5 MG/1
5 TABLET ORAL EVERY 8 HOURS PRN
Status: DISCONTINUED | OUTPATIENT
Start: 2022-11-18 | End: 2022-11-18 | Stop reason: HOSPADM

## 2022-11-18 RX ADMIN — PANTOPRAZOLE SODIUM 40 MG: 40 TABLET, DELAYED RELEASE ORAL at 05:23

## 2022-11-18 RX ADMIN — DULOXETINE HYDROCHLORIDE 60 MG: 60 CAPSULE, DELAYED RELEASE ORAL at 09:13

## 2022-11-18 RX ADMIN — POTASSIUM CHLORIDE 20 MEQ: 1500 TABLET, EXTENDED RELEASE ORAL at 09:20

## 2022-11-18 RX ADMIN — ACETAMINOPHEN 650 MG: 325 TABLET ORAL at 00:07

## 2022-11-18 RX ADMIN — CLOPIDOGREL BISULFATE 75 MG: 75 TABLET ORAL at 09:12

## 2022-11-18 RX ADMIN — SODIUM CHLORIDE, PRESERVATIVE FREE 10 ML: 5 INJECTION INTRAVENOUS at 09:14

## 2022-11-18 RX ADMIN — METOPROLOL TARTRATE 25 MG: 25 TABLET, FILM COATED ORAL at 09:13

## 2022-11-18 RX ADMIN — PREGABALIN 100 MG: 50 CAPSULE ORAL at 09:13

## 2022-11-18 RX ADMIN — OXYCODONE 5 MG: 5 TABLET ORAL at 04:08

## 2022-11-18 RX ADMIN — ENOXAPARIN SODIUM 30 MG: 100 INJECTION SUBCUTANEOUS at 09:13

## 2022-11-18 RX ADMIN — ACETAMINOPHEN 650 MG: 325 TABLET ORAL at 09:20

## 2022-11-18 RX ADMIN — AMIODARONE HYDROCHLORIDE 200 MG: 200 TABLET ORAL at 09:12

## 2022-11-18 RX ADMIN — IPRATROPIUM BROMIDE AND ALBUTEROL SULFATE 1 AMPULE: .5; 3 SOLUTION RESPIRATORY (INHALATION) at 08:12

## 2022-11-18 RX ADMIN — FUROSEMIDE 20 MG: 20 TABLET ORAL at 09:13

## 2022-11-18 ASSESSMENT — PAIN DESCRIPTION - ORIENTATION
ORIENTATION: MID
ORIENTATION: LEFT
ORIENTATION: LEFT;MID

## 2022-11-18 ASSESSMENT — PAIN - FUNCTIONAL ASSESSMENT
PAIN_FUNCTIONAL_ASSESSMENT: PREVENTS OR INTERFERES SOME ACTIVE ACTIVITIES AND ADLS
PAIN_FUNCTIONAL_ASSESSMENT: ACTIVITIES ARE NOT PREVENTED
PAIN_FUNCTIONAL_ASSESSMENT: ACTIVITIES ARE NOT PREVENTED

## 2022-11-18 ASSESSMENT — PAIN DESCRIPTION - ONSET: ONSET: ON-GOING

## 2022-11-18 ASSESSMENT — PAIN DESCRIPTION - PAIN TYPE: TYPE: SURGICAL PAIN

## 2022-11-18 ASSESSMENT — PAIN SCALES - GENERAL
PAINLEVEL_OUTOF10: 7
PAINLEVEL_OUTOF10: 8
PAINLEVEL_OUTOF10: 8

## 2022-11-18 ASSESSMENT — PAIN DESCRIPTION - DESCRIPTORS
DESCRIPTORS: ACHING
DESCRIPTORS: ACHING;TIGHTNESS
DESCRIPTORS: ACHING;TIGHTNESS

## 2022-11-18 ASSESSMENT — PAIN DESCRIPTION - LOCATION
LOCATION: CHEST

## 2022-11-18 ASSESSMENT — PAIN DESCRIPTION - FREQUENCY: FREQUENCY: CONTINUOUS

## 2022-11-18 NOTE — CARE COORDINATION
11/18/22, 12:10 PM EST    Patient goals/plan/ treatment preferences discussed by  and . Patient goals/plan/ treatment preferences reviewed with patient/ family. Patient/ family verbalize understanding of discharge plan and are in agreement with goal/plan/treatment preferences. Understanding was demonstrated using the teach back method. AVS provided by RN at time of discharge, which includes all necessary medical information pertaining to the patients current course of illness, treatment, post-discharge goals of care, and treatment preferences. Services At/After Discharge: Home Health       IMM Letter  IMM Letter given to Patient/Family/Significant other/Guardian/POA/by[de-identified] Patient registration  IMM Letter date given[de-identified] 11/17/22  IMM Letter time given[de-identified] 6104     [facility] and updated [facility] agency that patient had been admitted and was being discharged. AVS faxed to agency.

## 2022-11-18 NOTE — PLAN OF CARE
Problem: Respiratory - Adult  Goal: Achieves optimal ventilation and oxygenation  11/18/2022 0824 by Harshad Dewitt RCP  Outcome: Progressing   Duoneb and Acapella to improve breath sounds. Patient mutually agreed on goals.

## 2022-11-18 NOTE — PROGRESS NOTES
Discharge teaching and instructions for diagnosis/procedure of COVID-19 completed with patient using teachback method. AVS reviewed with patient and wife per Virtual Nurse. Prescription for DuoNeb Inhaler provided to patient per [facility]. Patient voiced understanding regarding prescriptions, follow up appointments, and care of self at home. Discharged ambulatory to  home with support per family.

## 2022-11-18 NOTE — PLAN OF CARE
Problem: Discharge Planning  Goal: Discharge to home or other facility with appropriate resources  Outcome: Completed     Problem: Safety - Adult  Goal: Free from fall injury  Outcome: Completed     Problem: Skin/Tissue Integrity  Goal: Absence of new skin breakdown  Description: 1. Monitor for areas of redness and/or skin breakdown  2. Assess vascular access sites hourly  3. Every 4-6 hours minimum:  Change oxygen saturation probe site  4. Every 4-6 hours:  If on nasal continuous positive airway pressure, respiratory therapy assess nares and determine need for appliance change or resting period.   Outcome: Completed     Problem: Infection - Adult  Goal: Absence of infection at discharge  Outcome: Completed  Goal: Absence of fever/infection during anticipated neutropenic period  Outcome: Completed     Problem: Pain  Goal: Verbalizes/displays adequate comfort level or baseline comfort level  Outcome: Completed     Problem: Respiratory - Adult  Goal: Achieves optimal ventilation and oxygenation  11/18/2022 1553 by Dior Osei RN  Outcome: Completed  11/18/2022 0824 by Deny Issa RCP  Outcome: Progressing

## 2022-11-18 NOTE — DISCHARGE SUMMARY
Internal Medicine Resident Discharge Summary      Patient Identification:   Susan Rios   : 1945  MRN: [id number]   Account: [de-identified]   Patient's PCP: Kristel Ruiz MD    Admit Date: 2022   Discharge Date: 2022      Admitting Physician: Belinda Mckenna,   Discharge Physician: Stephanie Klein DO       Discharge Diagnoses:  COVID-19 infection  Patient was found to be COVID-19 positive at [facility] ED on . Patient is afebrile during admission and did not require supplemental oxygenation. Patient's only complaint is SOB with exertion. Patient warranted admission due to recent CABG on  which makes patient have potential for decompensation. No dexamethasone required during admission due to patient being on room air. 2 view chest x-ray  shows pleural effusion that is improved from previous hospitalization. Pulse ox check while ambulating morning of discharge shows SPO2 > 94%. Patient was prescribed Combivent inhaler at discharge as needed for wheezing or shortness of breath. Patient also counseled on proper use of inhaler. CAD s/p recent CABG on 22  Patient has previously had multiple PCI to LAD and transapical diagonal.  On  PCI was initially attempted, however procedure was terminated with malignant stent in the LAD. Uncomplicated CABG was then performed. Patient had an unremarkable and progressive convalescence without adverse events. During admission patient denies any chest pain. Patient to continue home aspirin 81 mg and home Plavix 75 mg. Paroxysmal atrial fibrillation  Patient is not on any [address] at home. Patient is rhythm controlled with amiodarone. Patient is rate controlled with Lopressor. Patient to continue home amiodarone 200 mg and home Lopressor 25 mg twice daily. Essential hypertension, controlled  Patient to continue home Lopressor, home Lasix, and home KCl 20 mEq.     Hyperlipidemia  Patient to continue home pravastatin 80 mg. BPH  Patient to continue home tamsulosin 0.4 mg and home oxybutynin 5 mg. Neuropathy  Patient to continue home Cymbalta 60 mg and home Lyrica 100 mg 3 times daily. Anemia, unspecified type  Patient's initial Hgb 8.3. This seems at approximately patient's baseline. No signs of bleeding noted. GERD  Patient to continue home Protonix. Hospital Course:   Zelda Miller is a 78 y.o. male with PMHx p recent CABG, paroxysmal atrial fibrillation, HTN, HLD, BPH, neuropathy, anemia, GERD admitted to [address] [place] [address] on 11/17/2022 as a direct admission from [facility] ED for shortness of breath and COVID-19 infection. Patient states that his shortness of breath began evening prior to admission at approximately 10 to 11 PM.  Patient states that he was walking that he \"could not get enough air into his lungs\". Patient states that he had difficulties with calling for help at that point as well due to \"not having enough air in his lungs\". Patient's wife states the patient had a temperature of 100.7 °F on 11/15. Patient is currently afebrile. Patient states that he currently has no cough, however he does try to \"clear his lungs\" to get rid of phlegm from his recent surgery. Patient states that he continues have shortness of breath when lying flat. Patient states that after recent surgery he has been unable to lay flat \"sometimes\". Patient states that he believes he would feel better if he \"got some good sleep\". Patient also states that he received both doses of his COVID 19 and no boosters. Patient and his wife are both concerned due to patient previously having pleural effusions. At time of discharge from hospitalization for CABG procedure, patient's wife said they were told that patient had small pleural effusion which \"would need drained if it got worse\".      Patient initially presented to [facility] ED, however due to patient having recent CABG procedure at [facility], he was transferred to this facility. At [facility] ED patient was found to be COVID-19 positive. Patient is admitted to stepdown unit for further management of COVID-19 infection in the setting of recent CABG on 11/8/2022. Patient did not require any supplemental oxygenation during admission. On day of discharge patient states that he does feel better, however he is continuing to have shortness of breath. 2 view chest x-ray on day of discharge shows pleural effusion that is improved from previous hospitalization. Pulse ox check while ambulating morning of discharge shows SPO2 > 94%. Patient was prescribed Combivent inhaler at discharge as needed for wheezing or shortness of breath. Patient also counseled on proper use of inhaler. The patient was seen and examined on day of discharge and this discharge summary is in conjunction with any daily progress note from day of discharge. The patient is discharged in stable condition. Exam:   Vitals:  Vitals:    11/18/22 0004 11/18/22 0408 11/18/22 0438 11/18/22 0900   BP: 102/63   120/61   Pulse: 63   83   Resp: 16 14 14 20   Temp: 98 °F (36.7 °C)   98.3 °F (36.8 °C)   TempSrc: Oral   Oral   SpO2: 98%   95%   Weight:       Height:         Weight: Weight: 229 lb (103.9 kg)       General appearance: No acute distress, appears stated age. Eyes:  Pupils equal, round, and reactive to light. Conjunctivae/corneas clear. HENT: Head normal in appearance. External nares normal.  Oral mucosa moist without lesions. Hearing grossly intact. Neck: Supple, with full range of motion. Trachea midline. No gross JVD appreciated. Respiratory:  Normal respiratory effort. Clear to auscultation, bilaterally without rales or wheezes or rhonchi. Cardiovascular: Normal rate, regular rhythm with normal S1/S2 without murmurs. No lower extremity edema. +1 lower extremity pitting edema.   Healing surgical incision noted on chest  Abdomen: Soft, non-tender, non-distended with normal bowel sounds. Musculoskeletal: No joint swelling or tenderness. Normal tone. No abnormal movements. Skin: Warm and dry. No rashes or lesions. Neurologic:  No focal sensory/motor deficits in the upper and lower extremities. Cranial nerves:  grossly non-focal 2-12. Psychiatric: Alert and oriented, normal insight and thought content. Capillary Refill: Brisk,< 3 seconds. Peripheral Pulses: +2 palpable, equal bilaterally. Labs: For convenience the most recent labs are provided:  CBC:    Lab Results   Component Value Date/Time    WBC 7.8 11/18/2022 06:02 AM    HGB 8.3 11/18/2022 06:02 AM    HCT 26.0 11/18/2022 06:02 AM     11/18/2022 06:02 AM     Renal:    Lab Results   Component Value Date/Time     11/18/2022 06:02 AM    K 3.4 11/18/2022 06:02 AM    K 4.1 11/14/2022 09:05 PM     11/18/2022 06:02 AM    CO2 19 11/18/2022 06:02 AM    BUN 14 11/18/2022 06:02 AM    CREATININE 1.2 11/18/2022 06:02 AM    CALCIUM 8.6 11/18/2022 06:02 AM     Liver:   Lab Results   Component Value Date/Time    AST 24 09/23/2015 11:33 AM    ALT 26 09/23/2015 11:33 AM         Significant Diagnostic Studies    Radiology:   XR CHEST (2 VW)   Final Result   1. No interval change since previous study dated 15th of November 2022. 2. Moderate left pleural effusion and probable left lower lobe infiltrate. 3. Cardiomegaly status post previous coronary bypass surgery. 4. Postoperative changes at the cervicothoracic junction. .               **This report has been created using voice recognition software. It may contain minor errors which are inherent in voice recognition technology. **      Final report electronically signed by DR Mickey Sosa on 11/18/2022 10:47 AM      CT COMPARISON OF OUTSIDE FILMS   Final Result      XR COMPARISON OF OUTSIDE FILMS   Final Result             Consults:   None    Disposition: Home  Condition at Discharge: Stable    Code Status:  Full Code     Patient Instructions: Discharge lab work: None  Activity: activity as tolerated  Diet: ADULT DIET; Regular; Low Fat/Low Chol/High Fiber/2 gm Na      Follow-up visits:   Ozella Buerger, MD [address] .  [place] [address]  [phone]    Follow up in 2 week(s)  Follow-up for hospitalization due to COVID-19 infection. [address]  [phone]               Discharge Medications:      Medication List        START taking these medications      albuterol-ipratropium  MCG/ACT Aers inhaler  Commonly known as: COMBIVENT RESPIMAT  Inhale 1 puff into the lungs every 4 hours as needed for Wheezing or Shortness of Breath            CONTINUE taking these medications      amiodarone 200 MG tablet  Commonly known as: CORDARONE  Take 1 tablet by mouth daily     aspirin EC 81 MG EC tablet  Take 1 tablet by mouth daily     clopidogrel 75 MG tablet  Commonly known as: Plavix  Take 1 tablet by mouth daily     DULoxetine 30 MG extended release capsule  Commonly known as: CYMBALTA     fish oil-omega-3 fatty acids 1000 MG capsule     furosemide 20 MG tablet  Commonly known as: Lasix  Take 1 tablet by mouth daily     metoprolol tartrate 25 MG tablet  Commonly known as: LOPRESSOR  Take 1 tablet by mouth 2 times daily     oxybutynin 5 MG tablet  Commonly known as: DITROPAN     oxyCODONE 5 MG immediate release tablet  Commonly known as: ROXICODONE  Take 1 tablet by mouth every 8 hours as needed for Pain for up to 7 days.      pantoprazole sodium 40 MG Pack packet  Commonly known as: PROTONIX     potassium chloride 20 MEQ extended release tablet  Commonly known as: KLOR-CON M  Take 1 tablet by mouth daily     pravastatin 80 MG tablet  Commonly known as: PRAVACHOL  Take 1 tablet by mouth once daily     pregabalin 50 MG capsule  Commonly known as: LYRICA     tamsulosin 0.4 MG capsule  Commonly known as: FLOMAX     VITAMIN B 12 PO            STOP taking these medications      esomeprazole 40 MG delayed release capsule  Commonly known as: NEXIUM     multivitamin Tabs tablet     nitroGLYCERIN 0.4 MG SL tablet  Commonly known as: NITROSTAT     rOPINIRole 0.5 MG tablet  Commonly known as: REQUIP     tadalafil 10 MG tablet  Commonly known as: CIALIS     tiZANidine 4 MG tablet  Commonly known as: Vicangeline Winston               Where to Get Your Medications        These medications were sent to [address] [place] , [address] [facility] [address] [facility]  [address] [facility], [address] [place] [address] [address] [address]      Phone: [phone]   albuterol-ipratropium  MCG/ACT Aers inhaler                Time Spent on discharge is 45 minutes in the examination, evaluation, counseling and review of medications and discharge plan. Thank you Lamar Blackwell MD for the opportunity to be involved in this patient's care.       Signed:    Electronically signed by Elroy Small DO on 11/18/2022 at 4:24 PM     Case was discussed with Attending, Dr. Sherif Jung DO.

## 2022-11-18 NOTE — FLOWSHEET NOTE
11/18/22 3869   Safe Environment   Safety Measures Other (comment)  (VN safety round)   VN called into patients room and introduced myself and role. Patient answered and permitted video. Video activated. . Patient resting comfortably in bed. . Patient's visitor voiced concerns about not thinking the patient is ready to be discharged at this time. BSRN entered room as I was speaking with visitor. VN left the call to allow BSRN to discuss with the patient and patient's visitor.

## 2022-11-18 NOTE — PLAN OF CARE
Problem: Discharge Planning  Goal: Discharge to home or other facility with appropriate resources  Outcome: Progressing  Flowsheets (Taken 11/18/2022 0053)  Discharge to home or other facility with appropriate resources:   Identify barriers to discharge with patient and caregiver   Arrange for needed discharge resources and transportation as appropriate   Identify discharge learning needs (meds, wound care, etc)     Problem: Safety - Adult  Goal: Free from fall injury  Outcome: Progressing  Flowsheets (Taken 11/18/2022 0053)  Free From Fall Injury:   Instruct family/caregiver on patient safety   Based on caregiver fall risk screen, instruct family/caregiver to ask for assistance with transferring infant if caregiver noted to have fall risk factors     Problem: Skin/Tissue Integrity  Goal: Absence of new skin breakdown  Description: 1. Monitor for areas of redness and/or skin breakdown  2. Assess vascular access sites hourly  3. Every 4-6 hours minimum:  Change oxygen saturation probe site  4. Every 4-6 hours:  If on nasal continuous positive airway pressure, respiratory therapy assess nares and determine need for appliance change or resting period.   Outcome: Progressing     Problem: Infection - Adult  Goal: Absence of infection at discharge  Outcome: Progressing  Flowsheets (Taken 11/18/2022 0053)  Absence of infection at discharge: Assess and monitor for signs and symptoms of infection  Goal: Absence of fever/infection during anticipated neutropenic period  Outcome: Progressing     Problem: Pain  Goal: Verbalizes/displays adequate comfort level or baseline comfort level  Outcome: Progressing  Flowsheets  Taken 11/18/2022 0053 by Roc Bejarano RN  Verbalizes/displays adequate comfort level or baseline comfort level:   Encourage patient to monitor pain and request assistance   Assess pain using appropriate pain scale   Administer analgesics based on type and severity of pain and evaluate response   Implement non-pharmacological measures as appropriate and evaluate response  Taken 11/17/2022 1715 by Rashard Alberto  Verbalizes/displays adequate comfort level or baseline comfort level:   Encourage patient to monitor pain and request assistance   Assess pain using appropriate pain scale   Administer analgesics based on type and severity of pain and evaluate response   Implement non-pharmacological measures as appropriate and evaluate response     Problem: Respiratory - Adult  Goal: Achieves optimal ventilation and oxygenation  11/18/2022 0053 by Jessica Mackey RN  Outcome: Progressing  Flowsheets (Taken 11/18/2022 0053)  Achieves optimal ventilation and oxygenation:   Assess for changes in respiratory status   Assess for changes in mentation and behavior  11/17/2022 1801 by Jose Rivero RCP  Outcome: Progressing   Care plan reviewed with patient. Patient verbalize understanding of the plan of care and contribute to goal setting.

## 2022-11-18 NOTE — SIGNIFICANT EVENT
Brief Note:  Reordered home oxycodone per patient request.       Elizabeth Monroy MD, MPH, [facility]

## 2022-11-18 NOTE — CARE COORDINATION
11/18/22 1218   Readmission Assessment   Number of Days since last admission? 1-7 days   Previous Disposition Home with Home Health   What was the patient's/caregiver's perception as to why they think they needed to return back to the hospital? Other (Comment)  (SOB)   Did you visit your Primary Care Physician after you left the hospital, before you returned this time? No   Why weren't you able to visit your PCP? Did not have an appointment  (only discharged 2 days)   Who advised the patient to return to the hospital? Self-referral   Does the patient report anything that got in the way of taking their medications? No   In our efforts to provide the best possible care to you and others like you, can you think of anything that we could have done to help you after you left the hospital the first time, so that you might not have needed to return so soon?  Other (Comment)  (no, + COVID)

## 2022-11-21 ENCOUNTER — HOSPITAL ENCOUNTER (INPATIENT)
Age: 77
LOS: 2 days | Discharge: HOME HEALTH | DRG: 100 | End: 2022-11-23
Attending: EMERGENCY MEDICINE | Admitting: STUDENT IN AN ORGANIZED HEALTH CARE EDUCATION/TRAINING PROGRAM
Payer: MEDICARE

## 2022-11-21 ENCOUNTER — APPOINTMENT (OUTPATIENT)
Dept: CT IMAGING | Age: 77
DRG: 100 | End: 2022-11-21
Payer: MEDICARE

## 2022-11-21 ENCOUNTER — APPOINTMENT (OUTPATIENT)
Dept: MRI IMAGING | Age: 77
DRG: 100 | End: 2022-11-21
Payer: MEDICARE

## 2022-11-21 DIAGNOSIS — R56.9 SEIZURE (HCC): Primary | ICD-10-CM

## 2022-11-21 PROBLEM — R79.89 ELEVATED TROPONIN: Status: ACTIVE | Noted: 2022-11-21

## 2022-11-21 PROBLEM — R55 SYNCOPE, UNSPECIFIED SYNCOPE TYPE: Status: ACTIVE | Noted: 2022-11-21

## 2022-11-21 PROBLEM — R94.31 ABNORMAL EKG: Status: ACTIVE | Noted: 2022-11-21

## 2022-11-21 PROBLEM — R77.8 ELEVATED TROPONIN: Status: ACTIVE | Noted: 2022-11-21

## 2022-11-21 LAB
ALBUMIN SERPL-MCNC: 3.8 G/DL (ref 3.5–5.1)
ALP BLD-CCNC: 58 U/L (ref 38–126)
ALT SERPL-CCNC: 22 U/L (ref 11–66)
ANION GAP SERPL CALCULATED.3IONS-SCNC: 10 MEQ/L (ref 8–16)
AST SERPL-CCNC: 21 U/L (ref 5–40)
BASOPHILS # BLD: 0.3 %
BASOPHILS ABSOLUTE: 0 THOU/MM3 (ref 0–0.1)
BILIRUB SERPL-MCNC: 0.5 MG/DL (ref 0.3–1.2)
BUN BLDV-MCNC: 15 MG/DL (ref 7–22)
CALCIUM SERPL-MCNC: 8.7 MG/DL (ref 8.5–10.5)
CHLORIDE BLD-SCNC: 106 MEQ/L (ref 98–111)
CHP ED QC CHECK: 138
CO2: 23 MEQ/L (ref 23–33)
CREAT SERPL-MCNC: 1.2 MG/DL (ref 0.4–1.2)
EKG ATRIAL RATE: 62 BPM
EKG ATRIAL RATE: 66 BPM
EKG P AXIS: 44 DEGREES
EKG P AXIS: 59 DEGREES
EKG P-R INTERVAL: 190 MS
EKG P-R INTERVAL: 190 MS
EKG Q-T INTERVAL: 424 MS
EKG Q-T INTERVAL: 426 MS
EKG QRS DURATION: 80 MS
EKG QRS DURATION: 82 MS
EKG QTC CALCULATION (BAZETT): 430 MS
EKG QTC CALCULATION (BAZETT): 446 MS
EKG R AXIS: 23 DEGREES
EKG R AXIS: 24 DEGREES
EKG T AXIS: 96 DEGREES
EKG T AXIS: 97 DEGREES
EKG VENTRICULAR RATE: 62 BPM
EKG VENTRICULAR RATE: 66 BPM
EOSINOPHIL # BLD: 1.1 %
EOSINOPHILS ABSOLUTE: 0.1 THOU/MM3 (ref 0–0.4)
ERYTHROCYTE [DISTWIDTH] IN BLOOD BY AUTOMATED COUNT: 15.1 % (ref 11.5–14.5)
ERYTHROCYTE [DISTWIDTH] IN BLOOD BY AUTOMATED COUNT: 53.7 FL (ref 35–45)
ETHYL ALCOHOL, SERUM: < 0.01 %
FERRITIN: 400 NG/ML (ref 22–322)
FOLATE: 5.7 NG/ML (ref 4.8–24.2)
GFR SERPL CREATININE-BSD FRML MDRD: > 60 ML/MIN/1.73M2
GLUCOSE BLD-MCNC: 137 MG/DL (ref 70–108)
GLUCOSE BLD-MCNC: 138 MG/DL (ref 70–108)
GLUCOSE BLD-MCNC: 138 MG/DL (ref 70–108)
GLUCOSE BLD-MCNC: 139 MG/DL (ref 70–108)
GLUCOSE BLD-MCNC: 157 MG/DL (ref 70–108)
HCT VFR BLD CALC: 29.8 % (ref 42–52)
HEMOGLOBIN: 9.5 GM/DL (ref 14–18)
IMMATURE GRANS (ABS): 0.07 THOU/MM3 (ref 0–0.07)
IMMATURE GRANULOCYTES: 0.7 %
INR BLD: 1.1 (ref 0.85–1.13)
INR BLD: 1.14 (ref 0.85–1.13)
IRON SATURATION: 19 % (ref 20–50)
IRON: 45 UG/DL (ref 65–195)
LD: 470 U/L (ref 100–190)
LV EF: 53 %
LVEF MODALITY: NORMAL
LYMPHOCYTES # BLD: 11.7 %
LYMPHOCYTES ABSOLUTE: 1.2 THOU/MM3 (ref 1–4.8)
MAGNESIUM: 2 MG/DL (ref 1.6–2.4)
MCH RBC QN AUTO: 31.8 PG (ref 26–33)
MCHC RBC AUTO-ENTMCNC: 31.9 GM/DL (ref 32.2–35.5)
MCV RBC AUTO: 99.7 FL (ref 80–94)
MONOCYTES # BLD: 4.7 %
MONOCYTES ABSOLUTE: 0.5 THOU/MM3 (ref 0.4–1.3)
NUCLEATED RED BLOOD CELLS: 0 /100 WBC
OSMOLALITY CALCULATION: 280.5 MOSMOL/KG (ref 275–300)
PLATELET # BLD: 467 THOU/MM3 (ref 130–400)
PMV BLD AUTO: 9.5 FL (ref 9.4–12.4)
POTASSIUM SERPL-SCNC: 4 MEQ/L (ref 3.5–5.2)
PRO-BNP: 2655 PG/ML (ref 0–1800)
RBC # BLD: 2.99 MILL/MM3 (ref 4.7–6.1)
SEG NEUTROPHILS: 81.5 %
SEGMENTED NEUTROPHILS ABSOLUTE COUNT: 8.2 THOU/MM3 (ref 1.8–7.7)
SODIUM BLD-SCNC: 139 MEQ/L (ref 135–145)
T4 FREE: 1.4 NG/DL (ref 0.93–1.76)
TOTAL CK: 139 U/L (ref 55–170)
TOTAL IRON BINDING CAPACITY: 232 UG/DL (ref 171–450)
TOTAL PROTEIN: 6.2 G/DL (ref 6.1–8)
TOTAL PROTEIN: 6.2 G/DL (ref 6.1–8)
TROPONIN T: 0.03 NG/ML
TROPONIN T: 0.04 NG/ML
TROPONIN T: 0.05 NG/ML
TSH SERPL DL<=0.05 MIU/L-ACNC: 5.42 UIU/ML (ref 0.4–4.2)
VITAMIN B-12: 1047 PG/ML (ref 211–911)
WBC # BLD: 10.1 THOU/MM3 (ref 4.8–10.8)

## 2022-11-21 PROCEDURE — 87040 BLOOD CULTURE FOR BACTERIA: CPT

## 2022-11-21 PROCEDURE — 82728 ASSAY OF FERRITIN: CPT

## 2022-11-21 PROCEDURE — 6360000004 HC RX CONTRAST MEDICATION: Performed by: EMERGENCY MEDICINE

## 2022-11-21 PROCEDURE — 82607 VITAMIN B-12: CPT

## 2022-11-21 PROCEDURE — 95816 EEG AWAKE AND DROWSY: CPT | Performed by: PSYCHIATRY & NEUROLOGY

## 2022-11-21 PROCEDURE — 94669 MECHANICAL CHEST WALL OSCILL: CPT

## 2022-11-21 PROCEDURE — 71260 CT THORAX DX C+: CPT

## 2022-11-21 PROCEDURE — 84443 ASSAY THYROID STIM HORMONE: CPT

## 2022-11-21 PROCEDURE — 6370000000 HC RX 637 (ALT 250 FOR IP)

## 2022-11-21 PROCEDURE — 85610 PROTHROMBIN TIME: CPT

## 2022-11-21 PROCEDURE — 83615 LACTATE (LD) (LDH) ENZYME: CPT

## 2022-11-21 PROCEDURE — 83540 ASSAY OF IRON: CPT

## 2022-11-21 PROCEDURE — 6360000002 HC RX W HCPCS: Performed by: STUDENT IN AN ORGANIZED HEALTH CARE EDUCATION/TRAINING PROGRAM

## 2022-11-21 PROCEDURE — 83735 ASSAY OF MAGNESIUM: CPT

## 2022-11-21 PROCEDURE — 93010 ELECTROCARDIOGRAM REPORT: CPT | Performed by: INTERNAL MEDICINE

## 2022-11-21 PROCEDURE — 82550 ASSAY OF CK (CPK): CPT

## 2022-11-21 PROCEDURE — 99223 1ST HOSP IP/OBS HIGH 75: CPT | Performed by: INTERNAL MEDICINE

## 2022-11-21 PROCEDURE — 93005 ELECTROCARDIOGRAM TRACING: CPT | Performed by: EMERGENCY MEDICINE

## 2022-11-21 PROCEDURE — 2580000003 HC RX 258

## 2022-11-21 PROCEDURE — 84439 ASSAY OF FREE THYROXINE: CPT

## 2022-11-21 PROCEDURE — 6360000002 HC RX W HCPCS: Performed by: SOCIAL WORKER

## 2022-11-21 PROCEDURE — 82746 ASSAY OF FOLIC ACID SERUM: CPT

## 2022-11-21 PROCEDURE — 83550 IRON BINDING TEST: CPT

## 2022-11-21 PROCEDURE — 36415 COLL VENOUS BLD VENIPUNCTURE: CPT

## 2022-11-21 PROCEDURE — 87205 SMEAR GRAM STAIN: CPT

## 2022-11-21 PROCEDURE — 99223 1ST HOSP IP/OBS HIGH 75: CPT

## 2022-11-21 PROCEDURE — 84155 ASSAY OF PROTEIN SERUM: CPT

## 2022-11-21 PROCEDURE — 51798 US URINE CAPACITY MEASURE: CPT

## 2022-11-21 PROCEDURE — 93005 ELECTROCARDIOGRAM TRACING: CPT

## 2022-11-21 PROCEDURE — 70450 CT HEAD/BRAIN W/O DYE: CPT

## 2022-11-21 PROCEDURE — 82077 ASSAY SPEC XCP UR&BREATH IA: CPT

## 2022-11-21 PROCEDURE — 2580000003 HC RX 258: Performed by: SOCIAL WORKER

## 2022-11-21 PROCEDURE — 82948 REAGENT STRIP/BLOOD GLUCOSE: CPT

## 2022-11-21 PROCEDURE — 80053 COMPREHEN METABOLIC PANEL: CPT

## 2022-11-21 PROCEDURE — 94761 N-INVAS EAR/PLS OXIMETRY MLT: CPT

## 2022-11-21 PROCEDURE — 95819 EEG AWAKE AND ASLEEP: CPT

## 2022-11-21 PROCEDURE — 99285 EMERGENCY DEPT VISIT HI MDM: CPT

## 2022-11-21 PROCEDURE — 6360000002 HC RX W HCPCS

## 2022-11-21 PROCEDURE — 2060000000 HC ICU INTERMEDIATE R&B

## 2022-11-21 PROCEDURE — 85025 COMPLETE CBC W/AUTO DIFF WBC: CPT

## 2022-11-21 PROCEDURE — 84484 ASSAY OF TROPONIN QUANT: CPT

## 2022-11-21 PROCEDURE — 93306 TTE W/DOPPLER COMPLETE: CPT

## 2022-11-21 PROCEDURE — 72125 CT NECK SPINE W/O DYE: CPT

## 2022-11-21 PROCEDURE — 83880 ASSAY OF NATRIURETIC PEPTIDE: CPT

## 2022-11-21 RX ORDER — FUROSEMIDE 10 MG/ML
40 INJECTION INTRAMUSCULAR; INTRAVENOUS ONCE
Status: COMPLETED | OUTPATIENT
Start: 2022-11-21 | End: 2022-11-21

## 2022-11-21 RX ORDER — ACETAMINOPHEN 650 MG/1
650 SUPPOSITORY RECTAL EVERY 6 HOURS PRN
Status: DISCONTINUED | OUTPATIENT
Start: 2022-11-21 | End: 2022-11-23 | Stop reason: HOSPADM

## 2022-11-21 RX ORDER — ONDANSETRON 2 MG/ML
4 INJECTION INTRAMUSCULAR; INTRAVENOUS EVERY 6 HOURS PRN
Status: DISCONTINUED | OUTPATIENT
Start: 2022-11-21 | End: 2022-11-23 | Stop reason: HOSPADM

## 2022-11-21 RX ORDER — MAGNESIUM SULFATE IN WATER 40 MG/ML
2000 INJECTION, SOLUTION INTRAVENOUS PRN
Status: DISCONTINUED | OUTPATIENT
Start: 2022-11-21 | End: 2022-11-23 | Stop reason: HOSPADM

## 2022-11-21 RX ORDER — PANTOPRAZOLE SODIUM 40 MG/1
40 TABLET, DELAYED RELEASE ORAL
Status: DISCONTINUED | OUTPATIENT
Start: 2022-11-22 | End: 2022-11-23 | Stop reason: HOSPADM

## 2022-11-21 RX ORDER — OMEGA-3-ACID ETHYL ESTERS 1 G/1
1 CAPSULE, LIQUID FILLED ORAL DAILY
Status: DISCONTINUED | OUTPATIENT
Start: 2022-11-21 | End: 2022-11-23 | Stop reason: HOSPADM

## 2022-11-21 RX ORDER — TAMSULOSIN HYDROCHLORIDE 0.4 MG/1
0.4 CAPSULE ORAL DAILY
Status: DISCONTINUED | OUTPATIENT
Start: 2022-11-21 | End: 2022-11-23 | Stop reason: HOSPADM

## 2022-11-21 RX ORDER — SODIUM CHLORIDE 0.9 % (FLUSH) 0.9 %
5-40 SYRINGE (ML) INJECTION PRN
Status: DISCONTINUED | OUTPATIENT
Start: 2022-11-21 | End: 2022-11-23 | Stop reason: HOSPADM

## 2022-11-21 RX ORDER — SODIUM CHLORIDE 0.9 % (FLUSH) 0.9 %
10 SYRINGE (ML) INJECTION EVERY 12 HOURS SCHEDULED
Status: DISCONTINUED | OUTPATIENT
Start: 2022-11-21 | End: 2022-11-23 | Stop reason: HOSPADM

## 2022-11-21 RX ORDER — ASPIRIN 81 MG/1
81 TABLET ORAL DAILY
Status: DISCONTINUED | OUTPATIENT
Start: 2022-11-21 | End: 2022-11-23 | Stop reason: HOSPADM

## 2022-11-21 RX ORDER — FUROSEMIDE 20 MG/1
20 TABLET ORAL DAILY
Status: DISCONTINUED | OUTPATIENT
Start: 2022-11-21 | End: 2022-11-23 | Stop reason: HOSPADM

## 2022-11-21 RX ORDER — POTASSIUM CHLORIDE 20 MEQ/1
40 TABLET, EXTENDED RELEASE ORAL PRN
Status: DISCONTINUED | OUTPATIENT
Start: 2022-11-21 | End: 2022-11-23 | Stop reason: HOSPADM

## 2022-11-21 RX ORDER — LANOLIN ALCOHOL/MO/W.PET/CERES
1000 CREAM (GRAM) TOPICAL DAILY
Status: DISCONTINUED | OUTPATIENT
Start: 2022-11-21 | End: 2022-11-23 | Stop reason: HOSPADM

## 2022-11-21 RX ORDER — OXYBUTYNIN CHLORIDE 5 MG/1
5 TABLET ORAL NIGHTLY
Status: DISCONTINUED | OUTPATIENT
Start: 2022-11-21 | End: 2022-11-23 | Stop reason: HOSPADM

## 2022-11-21 RX ORDER — ACETAMINOPHEN 325 MG/1
650 TABLET ORAL EVERY 6 HOURS PRN
Status: DISCONTINUED | OUTPATIENT
Start: 2022-11-21 | End: 2022-11-23 | Stop reason: HOSPADM

## 2022-11-21 RX ORDER — CLOPIDOGREL BISULFATE 75 MG/1
75 TABLET ORAL DAILY
Status: DISCONTINUED | OUTPATIENT
Start: 2022-11-21 | End: 2022-11-23 | Stop reason: HOSPADM

## 2022-11-21 RX ORDER — SODIUM CHLORIDE 9 MG/ML
INJECTION, SOLUTION INTRAVENOUS PRN
Status: DISCONTINUED | OUTPATIENT
Start: 2022-11-21 | End: 2022-11-23 | Stop reason: HOSPADM

## 2022-11-21 RX ORDER — ONDANSETRON 4 MG/1
4 TABLET, ORALLY DISINTEGRATING ORAL EVERY 8 HOURS PRN
Status: DISCONTINUED | OUTPATIENT
Start: 2022-11-21 | End: 2022-11-23 | Stop reason: HOSPADM

## 2022-11-21 RX ORDER — SODIUM CHLORIDE 0.9 % (FLUSH) 0.9 %
10 SYRINGE (ML) INJECTION PRN
Status: DISCONTINUED | OUTPATIENT
Start: 2022-11-21 | End: 2022-11-23 | Stop reason: HOSPADM

## 2022-11-21 RX ORDER — DULOXETIN HYDROCHLORIDE 60 MG/1
60 CAPSULE, DELAYED RELEASE ORAL DAILY
Status: DISCONTINUED | OUTPATIENT
Start: 2022-11-21 | End: 2022-11-23 | Stop reason: HOSPADM

## 2022-11-21 RX ORDER — POLYETHYLENE GLYCOL 3350 17 G/17G
17 POWDER, FOR SOLUTION ORAL DAILY PRN
Status: DISCONTINUED | OUTPATIENT
Start: 2022-11-21 | End: 2022-11-23 | Stop reason: HOSPADM

## 2022-11-21 RX ORDER — OXYCODONE HYDROCHLORIDE 5 MG/1
5 TABLET ORAL EVERY 8 HOURS PRN
Status: ACTIVE | OUTPATIENT
Start: 2022-11-21 | End: 2022-11-22

## 2022-11-21 RX ORDER — ENOXAPARIN SODIUM 100 MG/ML
30 INJECTION SUBCUTANEOUS 2 TIMES DAILY
Status: DISCONTINUED | OUTPATIENT
Start: 2022-11-21 | End: 2022-11-23 | Stop reason: HOSPADM

## 2022-11-21 RX ORDER — AMIODARONE HYDROCHLORIDE 200 MG/1
200 TABLET ORAL DAILY
Status: DISCONTINUED | OUTPATIENT
Start: 2022-11-21 | End: 2022-11-23 | Stop reason: HOSPADM

## 2022-11-21 RX ORDER — PRAVASTATIN SODIUM 80 MG/1
80 TABLET ORAL NIGHTLY
Status: DISCONTINUED | OUTPATIENT
Start: 2022-11-21 | End: 2022-11-23 | Stop reason: HOSPADM

## 2022-11-21 RX ORDER — ALBUTEROL SULFATE 90 UG/1
1 AEROSOL, METERED RESPIRATORY (INHALATION) EVERY 4 HOURS PRN
Status: DISCONTINUED | OUTPATIENT
Start: 2022-11-21 | End: 2022-11-23 | Stop reason: HOSPADM

## 2022-11-21 RX ORDER — POTASSIUM CHLORIDE 7.45 MG/ML
10 INJECTION INTRAVENOUS PRN
Status: DISCONTINUED | OUTPATIENT
Start: 2022-11-21 | End: 2022-11-23 | Stop reason: HOSPADM

## 2022-11-21 RX ORDER — SODIUM CHLORIDE 0.9 % (FLUSH) 0.9 %
5-40 SYRINGE (ML) INJECTION EVERY 12 HOURS SCHEDULED
Status: DISCONTINUED | OUTPATIENT
Start: 2022-11-21 | End: 2022-11-23 | Stop reason: HOSPADM

## 2022-11-21 RX ADMIN — SODIUM CHLORIDE, PRESERVATIVE FREE 10 ML: 5 INJECTION INTRAVENOUS at 10:19

## 2022-11-21 RX ADMIN — ACETAMINOPHEN 650 MG: 325 TABLET ORAL at 10:09

## 2022-11-21 RX ADMIN — SODIUM CHLORIDE, PRESERVATIVE FREE 10 ML: 5 INJECTION INTRAVENOUS at 20:50

## 2022-11-21 RX ADMIN — OXYBUTYNIN CHLORIDE 5 MG: 5 TABLET ORAL at 20:59

## 2022-11-21 RX ADMIN — SODIUM CHLORIDE 2500 MG: 9 INJECTION, SOLUTION INTRAVENOUS at 20:53

## 2022-11-21 RX ADMIN — PRAVASTATIN SODIUM 80 MG: 80 TABLET ORAL at 20:59

## 2022-11-21 RX ADMIN — ENOXAPARIN SODIUM 30 MG: 100 INJECTION SUBCUTANEOUS at 10:09

## 2022-11-21 RX ADMIN — AMIODARONE HYDROCHLORIDE 200 MG: 200 TABLET ORAL at 11:41

## 2022-11-21 RX ADMIN — CLOPIDOGREL BISULFATE 75 MG: 75 TABLET ORAL at 10:09

## 2022-11-21 RX ADMIN — DULOXETINE HYDROCHLORIDE 60 MG: 60 CAPSULE, DELAYED RELEASE ORAL at 10:17

## 2022-11-21 RX ADMIN — Medication 1000 MCG: at 10:18

## 2022-11-21 RX ADMIN — OMEGA-3-ACID ETHYL ESTERS 1 G: 900 CAPSULE ORAL at 10:10

## 2022-11-21 RX ADMIN — METOPROLOL TARTRATE 25 MG: 25 TABLET, FILM COATED ORAL at 10:18

## 2022-11-21 RX ADMIN — ASPIRIN 81 MG: 81 TABLET, COATED ORAL at 10:09

## 2022-11-21 RX ADMIN — FUROSEMIDE 20 MG: 20 TABLET ORAL at 10:17

## 2022-11-21 RX ADMIN — IOPAMIDOL 80 ML: 755 INJECTION, SOLUTION INTRAVENOUS at 04:58

## 2022-11-21 RX ADMIN — FUROSEMIDE 40 MG: 10 INJECTION, SOLUTION INTRAMUSCULAR; INTRAVENOUS at 17:09

## 2022-11-21 RX ADMIN — ACETAMINOPHEN 650 MG: 325 TABLET ORAL at 21:52

## 2022-11-21 RX ADMIN — METOPROLOL TARTRATE 25 MG: 25 TABLET, FILM COATED ORAL at 20:59

## 2022-11-21 RX ADMIN — TAMSULOSIN HYDROCHLORIDE 0.4 MG: 0.4 CAPSULE ORAL at 10:18

## 2022-11-21 RX ADMIN — ENOXAPARIN SODIUM 30 MG: 100 INJECTION SUBCUTANEOUS at 21:00

## 2022-11-21 RX ADMIN — SODIUM CHLORIDE, PRESERVATIVE FREE 10 ML: 5 INJECTION INTRAVENOUS at 20:46

## 2022-11-21 ASSESSMENT — PAIN DESCRIPTION - LOCATION
LOCATION: CHEST
LOCATION: RIB CAGE
LOCATION: SHOULDER
LOCATION: BACK;SHOULDER

## 2022-11-21 ASSESSMENT — PAIN DESCRIPTION - ONSET
ONSET: ON-GOING
ONSET: ON-GOING

## 2022-11-21 ASSESSMENT — PAIN - FUNCTIONAL ASSESSMENT
PAIN_FUNCTIONAL_ASSESSMENT: 0-10
PAIN_FUNCTIONAL_ASSESSMENT: ACTIVITIES ARE NOT PREVENTED
PAIN_FUNCTIONAL_ASSESSMENT: ACTIVITIES ARE NOT PREVENTED
PAIN_FUNCTIONAL_ASSESSMENT: NONE - DENIES PAIN
PAIN_FUNCTIONAL_ASSESSMENT: ACTIVITIES ARE NOT PREVENTED
PAIN_FUNCTIONAL_ASSESSMENT: NONE - DENIES PAIN

## 2022-11-21 ASSESSMENT — PAIN DESCRIPTION - FREQUENCY
FREQUENCY: CONTINUOUS
FREQUENCY: CONTINUOUS
FREQUENCY: INTERMITTENT

## 2022-11-21 ASSESSMENT — PAIN SCALES - GENERAL
PAINLEVEL_OUTOF10: 0
PAINLEVEL_OUTOF10: 4
PAINLEVEL_OUTOF10: 5
PAINLEVEL_OUTOF10: 5
PAINLEVEL_OUTOF10: 4

## 2022-11-21 ASSESSMENT — ENCOUNTER SYMPTOMS
NAUSEA: 0
SORE THROAT: 0
VOMITING: 0
TROUBLE SWALLOWING: 0
SHORTNESS OF BREATH: 0
DIARRHEA: 0
CONSTIPATION: 0
COUGH: 0
ABDOMINAL DISTENTION: 0
COLOR CHANGE: 0
ABDOMINAL PAIN: 0
PHOTOPHOBIA: 0
SHORTNESS OF BREATH: 1
CHEST TIGHTNESS: 0
BACK PAIN: 0
COUGH: 1
RHINORRHEA: 0

## 2022-11-21 ASSESSMENT — PAIN DESCRIPTION - ORIENTATION
ORIENTATION: LOWER;RIGHT
ORIENTATION: RIGHT;LEFT
ORIENTATION: RIGHT

## 2022-11-21 ASSESSMENT — PAIN DESCRIPTION - PAIN TYPE
TYPE: SURGICAL PAIN
TYPE: CHRONIC PAIN
TYPE: ACUTE PAIN

## 2022-11-21 ASSESSMENT — PAIN DESCRIPTION - DIRECTION: RADIATING_TOWARDS: LEFT

## 2022-11-21 ASSESSMENT — PAIN DESCRIPTION - DESCRIPTORS
DESCRIPTORS: ACHING;DISCOMFORT
DESCRIPTORS: ACHING
DESCRIPTORS: ACHING;DISCOMFORT
DESCRIPTORS: ACHING

## 2022-11-21 NOTE — CONSULTS
[facility] [address]    Patient's Name/Date of Birth: Angela Fregoso / [birth year] (84 y.o.)    Date: November 21, 2022     Referring Provider: Stephanie Montejo PA-C    CHIEF COMPLAINT: Seizure activity, falls, LOAIZA    HPI: This is a pleasant 78 y.o. male presents with seizure activity and falls. Her PMH is significant for CAD s/p CABG x2 11/15/22, paroxysmal atrial fibrillation, previous syncopal episodes in 2015, HLD, HTN, bradycardia. Cardiology was consulted for elevated troponin, new anterior T wave inversion, and worsening LOAIZA. The patient states that he had a cath on 11/8/22 to clean out a re-stenosed artery, during which part of the wire broke off within the patient's artery. He required open heart surgery with 2 CABGs on 11/8/22, and stayed in the hospital for days before going home. He was back in the ED on 11/18/22 for SOB described as \"suffocating\" with any activity and was found to be positive for COVID, after which he was sent home. The patient stated that ever since he had tested positive for COVID, he has been having seizures (1 on Saturday, 3 on Sunday). Per the patient's wife, he stiffens his legs, thrashes his arms, has involuntary shaking, and collapses on the ground after crushing head pain. He is often groggy and confused for several minutes after each episode. He has never had symptoms like this before. Hx of occasional chest wall pain close to both shoulders after surgery and unchanged mild 2/2 and usually when cough and change position and last seconds to minutes, not exertion related . Anuradha Palmer He denies any palpitations, headache, nausea, vomiting, dizziness, or any other symptoms. He quit smoking since 2000, drinks about 4 cans of beer per week (although his drinking is inconsistent), and denies any illicit drug use.     Cardiac Medications: Amiodarone 200 mg daily, Aspirin 81 mg daily, Plavix 75 mg daily, IV lasix 40 mg daily, Lopressor 25 mg BID, Pravastatin 80 mg daily    Labs: Troponin 0.040 -> 0.034 (downtrending, no previous baseline), Pro-BNP 2655 (elevated)    EK22 - New-onset Anterior T-wave inversions, otherwise sinus rhythm    ECHO: 19 - EF 55%, LV function normal. Repeat ECHO pending    Cath: 10/26/22 - nonobstructive LCX and PCA disease, significant restenosis of the diagonal artery stent in the LAD.  EF 55%     All labs, EKG's, diagnostic testing and images as well as cardiac cath, stress testing were reviewed during this encounter    Past Medical History:   Diagnosis Date    Anxiety     Bradycardia 2016    Heart disease     Hyperlipidemia     Hypertension     Medtronic linq loop recorder 2016    Neuropathy     Paroxysmal atrial fibrillation ([address] 2015    Spondylolisthesis     Syncopal episodes 2015     Past Surgical History:   Procedure Laterality Date    ANGIOPLASTY  07' 10'    x5    BACK SURGERY      CATARACT REMOVAL  2021    CHOLECYSTECTOMY      CORONARY ANGIOPLASTY WITH STENT PLACEMENT  [id number]    CORONARY ARTERY BYPASS GRAFT N/A 2022    CABG x2 RAYA OFF PUMP performed by Susy Denny MD at [place] [address] ARTHROSCOPY      TONSILLECTOMY      TURP  2015     Current Facility-Administered Medications   Medication Dose Route Frequency Provider Last Rate Last Admin    amiodarone (CORDARONE) tablet 200 mg  200 mg Oral Daily DIO Gonzalez-C   200 mg at 22 1141    aspirin EC tablet 81 mg  81 mg Oral Daily DIO Gonzalez-C   81 mg at 22 1009    clopidogrel (PLAVIX) tablet 75 mg  75 mg Oral Daily DIO Gonzalez-C   75 mg at 22 1009    vitamin B-12 (CYANOCOBALAMIN) tablet 1,000 mcg  1,000 mcg Oral Daily DIO Gonzalez-C   1,000 mcg at 22 1018    DULoxetine (CYMBALTA) extended release capsule 60 mg  60 mg Oral Daily DIO Gonzalez-C   60 mg at 22 1017 omega-3 acid ethyl esters (LOVAZA) capsule 1 g  1 g Oral Daily JANE GonzalezC   1 g at 11/21/22 1010    [Held by provider] furosemide (LASIX) tablet 20 mg  20 mg Oral Daily DIO Gonzalez-C   20 mg at 11/21/22 1017    metoprolol tartrate (LOPRESSOR) tablet 25 mg  25 mg Oral BID JANE GonzalezC   25 mg at 11/21/22 1018    oxybutynin (DITROPAN) tablet 5 mg  5 mg Oral Nightly Jacqueline Estes PA-C        oxyCODONE (ROXICODONE) immediate release tablet 5 mg  5 mg Oral Q8H PRN Jacqueline Estes PA-C        [START ON 11/22/2022] pantoprazole (PROTONIX) tablet 40 mg  40 mg Oral QAM AC Jacqueline Estes PA-C        pravastatin (PRAVACHOL) tablet 80 mg  80 mg Oral Nightly Jacqueline Estes PA-C        tamsulosin (FLOMAX) capsule 0.4 mg  0.4 mg Oral Daily JANE GonzalezC   0.4 mg at 11/21/22 1018    sodium chloride flush 0.9 % injection 10 mL  10 mL IntraVENous 2 times per day JANE GonzalezC   10 mL at 11/21/22 1019    sodium chloride flush 0.9 % injection 10 mL  10 mL IntraVENous PRN DIO Gonzalez-C        0.9 % sodium chloride infusion   IntraVENous PRN Jacqueline Estes PA-C        enoxaparin Sodium (LOVENOX) injection 30 mg  30 mg SubCUTAneous BID JANE GonzalezC   30 mg at 11/21/22 1009    ondansetron (ZOFRAN-ODT) disintegrating tablet 4 mg  4 mg Oral Q8H PRN Jacqueline Estes PA-C        Or    ondansetron (ZOFRAN) injection 4 mg  4 mg IntraVENous Q6H PRN Jacqueline Estes PA-C        polyethylene glycol (GLYCOLAX) packet 17 g  17 g Oral Daily PRN Jacqueline Estes PA-C        acetaminophen (TYLENOL) tablet 650 mg  650 mg Oral Q6H PRN DIO Gonzalez-C   650 mg at 11/21/22 1009    Or    acetaminophen (TYLENOL) suppository 650 mg  650 mg Rectal Q6H PRN Jacqueline JORDEN Estes        potassium chloride (KLOR-CON M) extended release tablet 40 mEq  40 mEq Oral PRN Jacqueline Estes PA-C        Or    potassium bicarb-citric acid (EFFER-K) effervescent tablet 40 mEq  40 mEq Oral PRN Jacqueline JORDEN Estes        Or    potassium chloride 10 mEq/100 mL IVPB (Peripheral Line)  10 mEq IntraVENous PRN Jacqueline Estes PA-C        magnesium sulfate 2000 mg in 50 mL IVPB premix  2,000 mg IntraVENous PRN Jacqueline Estes PA-C        albuterol sulfate HFA (PROVENTIL;VENTOLIN;PROAIR) 108 (90 Base) MCG/ACT inhaler 1 puff  1 puff Inhalation Q4H PRN Jacqueline Estes PA-C        And    ipratropium (ATROVENT HFA) 17 MCG/ACT inhaler 1 puff  1 puff Inhalation Q4H PRN Jacqueline Estes PA-C        furosemide (LASIX) injection 40 mg  40 mg IntraVENous Once Tayler Luque MD        sodium chloride flush 0.9 % injection 5-40 mL  5-40 mL IntraVENous 2 times per day Jacqueline Estes PA-C        sodium chloride flush 0.9 % injection 5-40 mL  5-40 mL IntraVENous PRN Jacqueline Estes PA-C        0.9 % sodium chloride infusion   IntraVENous PRN Jacqueline Estes PA-C         Prior to Admission medications    Medication Sig Start Date End Date Taking? Authorizing Provider   albuterol-ipratropium (COMBIVENT RESPIMAT)  MCG/ACT AERS inhaler Inhale 1 puff into the lungs every 4 hours as needed for Wheezing or Shortness of Breath 11/18/22   Zelda Jovel DO   oxyCODONE (ROXICODONE) 5 MG immediate release tablet Take 1 tablet by mouth every 8 hours as needed for Pain for up to 7 days.  11/15/22 11/22/22  Krystal Constantino PA-C   metoprolol tartrate (LOPRESSOR) 25 MG tablet Take 1 tablet by mouth 2 times daily  Patient taking differently: Take 25 mg by mouth daily pm 11/15/22   Krystal Constantino PA-C   furosemide (LASIX) 20 MG tablet Take 1 tablet by mouth daily 11/15/22   Krystal Constantino PA-C   potassium chloride (KLOR-CON M) 20 MEQ extended release tablet Take 1 tablet by mouth daily 11/15/22   Krystal Constantino PA-C   amiodarone (CORDARONE) 200 MG tablet Take 1 tablet by mouth daily 11/15/22   Krystal Constantino PA-C   aspirin EC 81 MG EC tablet Take 1 tablet by mouth daily 11/15/22   Krystal Constantino PA-C   clopidogrel (PLAVIX) 75 MG tablet Take 1 tablet by mouth daily 11/15/22   Claudetta London, PA-C   pantoprazole sodium (PROTONIX) 40 MG PACK packet Take 40 mg by mouth every morning (before breakfast)    Historical Provider, MD   pravastatin (PRAVACHOL) 80 MG tablet Take 1 tablet by mouth once daily 10/19/22   Casey Vera MD   pregabalin (LYRICA) 50 MG capsule Take 100 mg by mouth 3 times daily.     Historical Provider, MD   Cyanocobalamin (VITAMIN B 12 PO) Take 1,000 mg by mouth daily    Historical Provider, MD   DULoxetine (CYMBALTA) 30 MG extended release capsule Take 60 mg by mouth daily     Historical Provider, MD   oxybutynin (DITROPAN) 5 MG tablet Take 5 mg by mouth nightly    Historical Provider, MD   tamsulosin (FLOMAX) 0.4 MG capsule Take 0.4 mg by mouth daily    Historical Provider, MD   fish oil-omega-3 fatty acids 1000 MG capsule Take 1 g by mouth daily   Patient not taking: Reported on 11/21/2022    Historical Provider, MD   Scheduled Meds:   amiodarone  200 mg Oral Daily    aspirin EC  81 mg Oral Daily    clopidogrel  75 mg Oral Daily    vitamin B-12  1,000 mcg Oral Daily    DULoxetine  60 mg Oral Daily    omega-3 acid ethyl esters  1 g Oral Daily    [Held by provider] furosemide  20 mg Oral Daily    metoprolol tartrate  25 mg Oral BID    oxybutynin  5 mg Oral Nightly    [START ON 11/22/2022] pantoprazole  40 mg Oral QAM AC    pravastatin  80 mg Oral Nightly    tamsulosin  0.4 mg Oral Daily    sodium chloride flush  10 mL IntraVENous 2 times per day    enoxaparin  30 mg SubCUTAneous BID    furosemide  40 mg IntraVENous Once    sodium chloride flush  5-40 mL IntraVENous 2 times per day     Continuous Infusions:   sodium chloride      sodium chloride       PRN Meds:.oxyCODONE, sodium chloride flush, sodium chloride, ondansetron **OR** ondansetron, polyethylene glycol, acetaminophen **OR** acetaminophen, potassium chloride **OR** potassium alternative oral replacement **OR** potassium chloride, magnesium sulfate, albuterol sulfate HFA **AND** ipratropium, sodium chloride flush, sodium chloride    Allergies   Allergen Reactions    Adhesive Tape Rash     Family History   Problem Relation Age of Onset    Heart Disease Mother 62        CABG    High Cholesterol Mother     Cancer Mother     Heart Disease Father 61        stents    Cancer Father     Cancer Brother     Asthma Brother      Social History     Socioeconomic History    Marital status: Life Partner     Spouse name: Rosa Born    Number of children: Not on file    Years of education: Not on file    Highest education level: Not on file   Occupational History    Not on file   Tobacco Use    Smoking status: Former     Packs/day: 5.00     Years: 15.00     Pack years: 75.00     Types: Cigarettes     Quit date: 2000     Years since quittin.8    Smokeless tobacco: Never   Vaping Use    Vaping Use: Never used   Substance and Sexual Activity    Alcohol use: Yes     Comment: couple beers a night    Drug use: No    Sexual activity: Yes     Partners: Female   Other Topics Concern    Not on file   Social History Narrative    Not on file     Social Determinants of Health     Financial Resource Strain: Not on file   Food Insecurity: Not on file   Transportation Needs: Not on file   Physical Activity: Not on file   Stress: Not on file   Social Connections: Not on file   Intimate Partner Violence: Not on file   Housing Stability: Not on file     ROS:   Constitutional: Denies any recent wt change. Eyes:  Denies any blurring or double vision, no glaucoma  Ears/Nose/Mouth/Throat:  Denies any chronic sinus/rhinitis, bleeding gums  Cardiovascular:  As described above. Respiratory:  Reports \"suffocating SOB\".  Denies any frequent cough, wheezing or coughing up blood  Genitourinary:  Denies difficulty with urination and kidney stones  Gastrointestinal:  Denies any chronic problems with abdominal pain, nausea, vomiting or diarrhea  Musculoskeletal:  Denies any joint pain, back pain, or difficulty walking  Integumentary:  Denies any rash  Neurological: Reports previous seizure activity associated with postictal state. No numbness or tingling  Endocrine:  Denies any polydipsia. Hematologic/Lymphatic:  Denies any hemorrhage or lymphatic drainage problems. Labs:  CBC:   Recent Labs     11/21/22 0419   WBC 10.1   HGB 9.5*   HCT 29.8*   MCV 99.7*   *     BMP:   Recent Labs     11/21/22 0419      K 4.0      CO2 23   BUN 15   CREATININE 1.2   MG 2.0     Accucheck Glucoses:   Recent Labs     11/21/22 0424 11/21/22  1046   POCGLU 139* 157*     Cardiac Enzymes:   Recent Labs     11/21/22 0419   CKTOTAL 139     PT/INR:   Recent Labs     11/21/22 0419   INR 1.10     APTT: No results for input(s): APTT in the last 72 hours. Liver Profile:  Lab Results   Component Value Date/Time    AST 21 11/21/2022 04:19 AM    ALT 22 11/21/2022 04:19 AM    BILITOT 0.5 11/21/2022 04:19 AM    ALKPHOS 58 11/21/2022 04:19 AM     Lab Results   Component Value Date/Time    CHOL 136 11/08/2022 09:07 AM    HDL 52 11/08/2022 09:07 AM    TRIG 131 11/08/2022 09:07 AM     TSH:   Lab Results   Component Value Date/Time    TSH 5.420 11/21/2022 04:19 AM     UA:   Lab Results   Component Value Date/Time    COLORU DK YELLOW 11/11/2022 11:20 AM    PHUR 5.5 11/11/2022 11:20 AM    LABCAST 4-8 FINE GRAN 11/11/2022 11:20 AM    LABCAST 0-4 C. GRAN 11/11/2022 11:20 AM    WBCUA 5-9 11/11/2022 11:20 AM    RBCUA 15-20 11/11/2022 11:20 AM    MUCUS THREADS 11/11/2022 11:20 AM    YEAST NONE SEEN 11/11/2022 11:20 AM    BACTERIA FEW 11/11/2022 11:20 AM    SPECGRAV >1.030 11/11/2022 11:20 AM    LEUKOCYTESUR SMALL 11/11/2022 11:20 AM    UROBILINOGEN 1.0 11/11/2022 11:20 AM    BILIRUBINUR SMALL 11/11/2022 11:20 AM    BLOODU LARGE 11/11/2022 11:20 AM    AMORPHOUS URATES 11/11/2022 11:20 AM         Physical Exam:  Vitals:    11/21/22 1122   BP: (!) 152/79   Pulse: 66   Resp: 20   Temp: 98.4 °F (36.9 °C)   SpO2: 96%      Intake/Output Summary (Last 24 hours) at 11/21/2022 1517  Last data filed at 11/21/2022 1025  Gross per 24 hour   Intake 160 ml   Output --   Net 160 ml      General:  No acute distress  Neck: Supple, no JVD, no carotid bruits  Heart: Regular rhythm normal S1 and S2, no rubs, murmurs or gallops  Lungs: clear to ascultation no rales, wheezes, or rhonchi  Abdomen: positive bowel sounds, soft, non-tender, non-distended, no bruits, no masses  Extremities:no clubbing, cyanosis or edema  Neurologic: alert and oriented x 3, cranial nerves 2-12 grossly intact, motor and sensory intact, moving all extremities  Skin: No rashes. Clean, dry surgical scar over chest consistent with CABG with very mild tenderness. Mild bruise noted over head. Psych: AO x 3, no depression/pooja, no pressured speech, normal affect  Lymph: No obvious LAD      Assessment:  Patient's fall episodes consistent with seizure, low suspicion for cardiogenic syncope at this time  Hx of occasional Chest wall Pain with tenderness mild 2/2- seem to be related to surgery ( unchanged since surgery)  Elevated troponin mild -down trending troponin from the surgery These findings are consistent with postop chest pain from a recent cardiac procedure and NOT consistent with acute ACS event or rethrombosis. CAD s/p CABG x2 11/8/22  Positive COVID-19 infection  Essential HTN  Paroxysmal A. Fib - [id number] score 5. Currently NSR, not on anticoagulation due to DAPT therapy. Plan:  Repeat EKG and Troponin tomorrow morning. No further workup indicated if the above does not demonstrate acutely worsening changes. Continue current medical therapy. Continue to monitor on telemetry  We will continue to follow. Thank you for allowing us to participate in the care of this patient. Please do not hesitate to call us with questions. Electronically signed by Mely Blount DO on 11/21/2022 at 3:17 PM      I have seen and examined the patient independently.  . Face to face evaluation and

## 2022-11-21 NOTE — PROCEDURES
Date: 11/21/2022  Referring physician: Erna Cary PA-C    Indication  Patient aged 78 y with encephalopathy. EEG done to assess for epileptiform activity. Introduction  This routine 20-minute EEG was recorded using the [employer] 10-20 System on a [employer] workstation at 256 samples/s. Automated spike and seizure detection algorithms were applied. Description  During the maximal alert state, a well-regulated, symmetric, and reactive 8 Hz posterior dominant rhythm was seen. No consistent focal slowing or interhemispheric asymmetry was noted. Stage I and stage II sleep were observed. There were no interictal epileptiform discharges or electrographic seizures. Activations  Hyperventilation was not performed. Intermittent photic stimulation was performed and demonstrated no posterior driving response. Impression  Normal awake and sleep EEG. EKG lead did not show clear arrhythmia, if still in concern consider formal EKG or correlation with telemetry. No epileptiform discharges were identified. Please note the absence of such activity on this record cannot conclusively rule out an epileptic disorder. If such is still clinically suspected, a repeat study with sleep deprivation and/or prolonged sampling may be helpful. Tammi Avila MD  Epilepsy Board Certified. Neurology Board Certified.     Electronically Signed

## 2022-11-21 NOTE — FLOWSHEET NOTE
11/21/22 1732   Safe Environment   Safety Measures Other (comment)  (vn rounds)   Pt and spouse in room no voiced complaints no s/s distress noted

## 2022-11-21 NOTE — PLAN OF CARE
Problem: Discharge Planning  Goal: Discharge to home or other facility with appropriate resources  Outcome: Progressing  Flowsheets (Taken 11/21/2022 0846)  Discharge to home or other facility with appropriate resources: Identify barriers to discharge with patient and caregiver  Note: Patient educated that discharge plan is still in progress. Problem: Pain  Goal: Verbalizes/displays adequate comfort level or baseline comfort level  Outcome: Progressing  Flowsheets (Taken 11/21/2022 1722)  Verbalizes/displays adequate comfort level or baseline comfort level: Encourage patient to monitor pain and request assistance  Note: Patient reports pain as a 4 on a 0-10 scale this shift. Patient's stated pain goal is 4/10. Pain is acute and located in the chest/shoulders described as aching. Non-pharmacological pain interventions include rest and repositioning. Pharmacological pain interventions on board per physician's order. Problem: Skin/Tissue Integrity  Goal: Absence of new skin breakdown  Description: 1. Monitor for areas of redness and/or skin breakdown  2. Assess vascular access sites hourly  3. Every 4-6 hours minimum:  Change oxygen saturation probe site  4. Every 4-6 hours:  If on nasal continuous positive airway pressure, respiratory therapy assess nares and determine need for appliance change or resting period. Outcome: Progressing  Note: Patient exhibits no new skin breakdown this shift. Patient repositioned Q2H and as needed with staff assistance. All skin integrity issuse charted in Flowsheets. Will continue to monitor. Problem: Safety - Adult  Goal: Free from fall injury  Outcome: Progressing  Flowsheets (Taken 11/21/2022 1722)  Free From Fall Injury: Instruct family/caregiver on patient safety  Note: Patient remains free from falls this shift. Fall precautions in place with bed/chair exit alarmed. Fall sign posted and fall armband in place. Nonskid footwear used with transferring. Educated patient to use call light when in need of staff assistance with transferring, ambulating, and other activities of daily living. Patient appropriately uses call light this shift. Problem: ABCDS Injury Assessment  Goal: Absence of physical injury  Outcome: Progressing  Flowsheets (Taken 11/21/2022 1722)  Absence of Physical Injury: Implement safety measures based on patient assessment     Problem: Neurosensory - Adult  Goal: Achieves stable or improved neurological status  Outcome: Progressing  Flowsheets (Taken 11/21/2022 1727)  Achieves stable or improved neurological status:   Assess for and report changes in neurological status   Initiate measures to prevent increased intracranial pressure  Goal: Absence of seizures  Outcome: Progressing  Flowsheets (Taken 11/21/2022 1727)  Absence of seizures:   Monitor for seizure activity.   If seizure occurs, document type and location of movements and any associated apnea   If seizure occurs, turn head to side and suction secretions as needed  Goal: Remains free of injury related to seizures activity  Outcome: Progressing  Flowsheets (Taken 11/21/2022 1727)  Remains free of injury related to seizure activity:   Monitor patient for seizure activity, document and report duration and description of seizure to Licensed Independent Practitioner   Maintain airway, patient safety  and administer oxygen as ordered   Seizure pads on all 4 side rails     Problem: Cardiovascular - Adult  Goal: Maintains optimal cardiac output and hemodynamic stability  Outcome: Progressing  Flowsheets (Taken 11/21/2022 1727)  Maintains optimal cardiac output and hemodynamic stability:   Monitor blood pressure and heart rate   Monitor urine output and notify Licensed Independent Practitioner for values outside of normal range   Assess for signs of decreased cardiac output  Note:   Vitals:    11/21/22 0717 11/21/22 0846 11/21/22 1122 11/21/22 1628   BP: 117/66 (!) 145/75 (!) 152/79 (!) 140/72   Pulse: 59 66 66 72   Resp: 14 18 20 18   Temp:  98.4 °F (36.9 °C) 98.4 °F (36.9 °C) 98.5 °F (36.9 °C)   TempSrc:  Oral Oral Oral   SpO2: 97% 100% 96% 97%   Weight:  228 lb 13.4 oz (103.8 kg)     Height:  5' 6\" (1.676 m)        Goal: Absence of cardiac dysrhythmias or at baseline  Outcome: Progressing  Flowsheets  Taken 11/21/2022 1727  Absence of cardiac dysrhythmias or at baseline: Monitor cardiac rate and rhythm  Taken 11/21/2022 0846  Absence of cardiac dysrhythmias or at baseline: Monitor cardiac rate and rhythm     Problem: Hematologic - Adult  Goal: Maintains hematologic stability  Outcome: Progressing  Flowsheets (Taken 11/21/2022 1727)  Maintains hematologic stability: Assess for signs and symptoms of bleeding or hemorrhage   Care plan reviewed with patient and family. Patient and family verbalize understanding of the plan of care and contribute to goal setting.

## 2022-11-21 NOTE — H&P
Hospitalist - History & Physical      Patient: Jorge Lopez    Unit/Bed:08/008A  YOB: 1945  MRN: [id number]   Acct: [de-identified]   PCP: Natalya Ayon MD    Date of Service: Pt seen/examined on 11/21/22  and Admitted to Observation with expected LOS less than two midnights due to medical therapy. Chief Complaint:  frequent falls and loss of consciousness    Assessment and Plan:  Concern for seizure:    Pt endorses 4-5 episodes of falls, LOC, involuntary shaking, and urinary incontinence since most recent discharge. Pt is afebrile, hemodynamically stable, appears in no distress. Labs shows elevated trop, elevated BNP, chronic macrocytic anemia. CT head negative. No episodes since arriving to ED. Neurology consulted- DIO Anglin made aware. Telemetry. PT/OT. Mag, Vit b 12, folate, TSH, iron studies ordered. Repeat CBC, BMP in the AM. Orthostatic BP q shift. Blood cultures x 2. CAD, S/P CABG:    Recent CABG with Dr. Harinder López on 11/8. Trop is mildly elevated, BNP elevated, EKG today shows NSR with t wave inversions in V2, V3. Pt denies chest pain at this time. CT chest today shows small right and moderate left pleural effusion that extends to the left lung apex; mild interstitial densities of left lung base suspicious for pulmonary edema. A well geographically defined heterogenous fluid collection of superior left mediastinum, likely post-op changes. Small pericardial effusion noted. Cardiology consulted- Dr. Shelton Ruth aware. Repeat EKG upon admission. IR consulted for diagnostic and therapeutic thoracentesis of left side. Covid 19 infection:    Tested positive 11/16 at [facility] ED. Pt remains afebrile, non-toxic appearing. CT chest as stated above. Pt at baseline on RA at this time, however continues to endorse LOAIZA. Continue IS/AC. Essential HTN:    BP appears stable. Continue home meds. Continue to monitor closely.      Paroxsymal a. Fib:    Recently started Amiodarone, lopressor, lasix. Continue ASA, Plavix. Does not appear to be on anticoagulation at this time. [id number] score 5. Telemetry. History Of Present Illness:    Martha Alfonso is a 78 y.o  male with a PMHx of A. Fib, CAD, HTN who presents to [facility] ED today for the evaluation of frequent falls and loss of consciousness. Pt states since the day he was last discharged he has experienced significant shortness of breath with any activity, and frequent falls. He states just prior to falling he gets this \"lightness in my head\" then falls. Pt reports having 4-5 episodes of this. Wife at bedside reports most of these episodes have been witnessed and it appears he \"goes out\" and starts shaking involuntarily. Pt denies recollection of these events and is not responsive to his name. He states he has had 2-3 episodes of urinary incontinence as a result of these episodes. He denies biting his tongue, bowel incontinence, abdominal pain, chest pain. Pt expresses that he is scared today. Past Medical History:        Diagnosis Date    Anxiety     Bradycardia 4/8/2016    Heart disease     Hyperlipidemia     Hypertension     Medtronic linq loop recorder 4/8/2016    Neuropathy     Paroxysmal atrial fibrillation ([address] 9/23/2015    Spondylolisthesis     Syncopal episodes 9/23/2015       Past Surgical History:        Procedure Laterality Date    ANGIOPLASTY  07' 10'    x5    BACK SURGERY  2008    CATARACT REMOVAL  05/04/2021    CHOLECYSTECTOMY      CORONARY ANGIOPLASTY WITH STENT PLACEMENT  [id number]    CORONARY ARTERY BYPASS GRAFT N/A 11/8/2022    CABG x2 RAYA OFF PUMP performed by Norberto Gan MD at [address] [facility]  1990 1990, 2021    SHOULDER ARTHROSCOPY      TONSILLECTOMY      TURP  2015       Home Medications:   No current facility-administered medications on file prior to encounter.      Current Outpatient Medications on File Prior to Encounter   Medication Sig Dispense Refill    albuterol-ipratropium (COMBIVENT RESPIMAT)  MCG/ACT AERS inhaler Inhale 1 puff into the lungs every 4 hours as needed for Wheezing or Shortness of Breath 1 each 1    oxyCODONE (ROXICODONE) 5 MG immediate release tablet Take 1 tablet by mouth every 8 hours as needed for Pain for up to 7 days. 21 tablet 0    metoprolol tartrate (LOPRESSOR) 25 MG tablet Take 1 tablet by mouth 2 times daily 60 tablet 3    furosemide (LASIX) 20 MG tablet Take 1 tablet by mouth daily 30 tablet 0    potassium chloride (KLOR-CON M) 20 MEQ extended release tablet Take 1 tablet by mouth daily 30 tablet 0    amiodarone (CORDARONE) 200 MG tablet Take 1 tablet by mouth daily 30 tablet 0    aspirin EC 81 MG EC tablet Take 1 tablet by mouth daily 90 tablet 1    clopidogrel (PLAVIX) 75 MG tablet Take 1 tablet by mouth daily 30 tablet 3    pantoprazole sodium (PROTONIX) 40 MG PACK packet Take 40 mg by mouth every morning (before breakfast)      pravastatin (PRAVACHOL) 80 MG tablet Take 1 tablet by mouth once daily 90 tablet 3    pregabalin (LYRICA) 50 MG capsule Take 100 mg by mouth 3 times daily. Cyanocobalamin (VITAMIN B 12 PO) Take 1,000 mg by mouth daily      DULoxetine (CYMBALTA) 30 MG extended release capsule Take 60 mg by mouth daily       oxybutynin (DITROPAN) 5 MG tablet Take 5 mg by mouth nightly      tamsulosin (FLOMAX) 0.4 MG capsule Take 0.4 mg by mouth daily      fish oil-omega-3 fatty acids 1000 MG capsule Take 1 g by mouth daily          Allergies:    Patient has no known allergies. Social History:    reports that he quit smoking about 22 years ago. His smoking use included cigarettes. He has a 75.00 pack-year smoking history. He has never used smokeless tobacco. He reports current alcohol use. He reports that he does not use drugs.     Family History:       Problem Relation Age of Onset    Heart Disease Mother 62        CABG    High Cholesterol Mother     Cancer Mother     Heart Disease Father 61 stents    Cancer Father     Cancer Brother     Asthma Brother        Diet:  No diet orders on file    Review of systems:     Review of Systems   Constitutional:  Positive for activity change. Negative for appetite change, chills, fatigue and fever. HENT:  Negative for congestion, rhinorrhea and sore throat. Eyes:  Negative for visual disturbance. Respiratory:  Positive for cough and shortness of breath. Negative for chest tightness. Cardiovascular:  Negative for chest pain and leg swelling. Gastrointestinal:  Negative for abdominal distention, abdominal pain, constipation, diarrhea, nausea and vomiting. Genitourinary:  Negative for difficulty urinating, frequency and urgency. Musculoskeletal:  Positive for arthralgias and myalgias. Negative for back pain. Skin:  Positive for wound. Neurological:  Positive for seizures, syncope and weakness. Negative for dizziness and light-headedness. Psychiatric/Behavioral:  Negative for confusion. The patient is nervous/anxious. PHYSICAL EXAM:  /70   Pulse 63   Temp 98 °F (36.7 °C) (Axillary)   Resp 16   Ht 5' 6\" (1.676 m)   Wt 230 lb (104.3 kg)   SpO2 98%   BMI 37.12 kg/m²   General appearance: Chronically ill appearing. No apparent distress. Appears stated age and cooperative. Skin: Healing, closed wounds in the midline of sternum consistent with recent CABG and sutures in place in epigastric region. Skin color, texture, turgor normal.  No rashes or lesions. HEENT: Normal cephalic, atraumatic without obvious deformity. Pupils equal, round, and reactive to light. Extra-ocular muscles intact. Conjunctivae/corneas clear. Neck: Trachea midline. Supple, with full range of motion. Cardiovascular: Regular rate and rhythm with normal S1/S2. No murmurs, rubs or gallops. Respiratory:  Normal respiratory effort. Clear to auscultation, bilaterally without rales, wheezes, or rhonchi. Abdomen: Soft, non-tender, non-distended.  Normal bowel sounds. Musculoskeletal:  No weakness or instability noted. No edema, erythema, or gross deformity noted. Vascular:  Pulses +2 palpable, equal bilaterally. Neurologic:  Neurovascularly intact without any focal sensory/motor deficits. Cranial nerves: II-XII grossly intact. Psychiatric: Alert and oriented, thought content appropriate, normal insight      Labs:   Recent Labs     11/21/22 0419   WBC 10.1   HGB 9.5*   HCT 29.8*   *     Recent Labs     11/21/22 0419      K 4.0      CO2 23   BUN 15   CREATININE 1.2   CALCIUM 8.7     Recent Labs     11/21/22 0419   AST 21   ALT 22   BILITOT 0.5   ALKPHOS 58     Recent Labs     11/21/22 0419   INR 1.10     Recent Labs     11/21/22 0419   CKTOTAL 139       Urinalysis:    Lab Results   Component Value Date/Time    NITRU NEGATIVE 11/11/2022 11:20 AM    WBCUA 5-9 11/11/2022 11:20 AM    BACTERIA FEW 11/11/2022 11:20 AM    RBCUA 15-20 11/11/2022 11:20 AM    BLOODU LARGE 11/11/2022 11:20 AM    SPECGRAV >1.030 11/11/2022 11:20 AM       Radiology:   CT HEAD WO CONTRAST   Final Result   1. No acute intracranial process. 2. Minimal changes of chronic microvascular ischemic disease and    age-related global volume loss. This document has been electronically signed by: Humberto Monzon DO on    11/21/2022 05:58 AM      All CTs at this facility use dose modulation techniques and iterative    reconstructions, and/or weight-based dosing   when appropriate to reduce radiation to a low as reasonably achievable. CT CERVICAL SPINE WO CONTRAST   Final Result   1. No acute fracture involving the cervical spine. 2. ACDF of C5-7, the surgical hardware is intact. 3. Spondylitic changes of the cervical spine including facet/uncovertebral    joint arthropathy. 4. Partially visualized left pleural effusion extending to the left lung    apex.       This document has been electronically signed by: Humberto Monzon DO on    11/21/2022 06:05 AM      All CTs at this facility use dose modulation techniques and iterative    reconstructions, and/or weight-based dosing   when appropriate to reduce radiation to a low as reasonably achievable. CT CHEST W CONTRAST   Final Result   1. Small right and moderate left pleural effusion. The left pleural    effusion extends to the left lung apex. Passive atelectasis within the    left lung base. Mild interstitial densities of the left lung base likely    representing pulmonary edema. Recommend continued follow-up to clearing of    these findings. 2. Within the superior left mediastinum and adjacent to the coronary    artery bypass grafting there is a well geographically defined    heterogeneous fluid collection. The average Hounsfield units centrally    within this collection measures proximally 35 consistent with complex    fluid. Finding may be related to coronary bypass grafting. Recommend    correlating with any prior CT imaging of the thorax. If none exists,    recommend follow-up examination in 3 months to document stability of this    finding. The fluid collection measures 4.2 x 3.5 x 3.8 cm and is best    identified on axial image 21 of series 2. No suspicious peripheral    nodularity or internal septations. 3. Moderate/severe coronary atherosclerosis. Small pericardial effusion    measuring up to 11 mm ventrally. This document has been electronically signed by: Chema Hansen DO on    11/21/2022 06:15 AM      All CTs at this facility use dose modulation techniques and iterative    reconstructions, and/or weight-based dosing   when appropriate to reduce radiation to a low as reasonably achievable. CT HEAD WO CONTRAST    Result Date: 11/21/2022  CT head without contrast Comparison: None Findings: No intracranial mass, midline shift, hydrocephalus, or acute hemorrhage. Minimal changes of chronic microvascular ischemic disease and age-related global volume loss.  Minimal mucosal thickening of the bilateral maxillary sinuses and ethmoid sinuses. The orbits are within normal limits. No acute fracture. 1. No acute intracranial process. 2. Minimal changes of chronic microvascular ischemic disease and age-related global volume loss. This document has been electronically signed by: Enrico Ramos DO on 11/21/2022 05:58 AM All CTs at this facility use dose modulation techniques and iterative reconstructions, and/or weight-based dosing when appropriate to reduce radiation to a low as reasonably achievable. CT CHEST W CONTRAST    Result Date: 11/21/2022  CT chest with contrast. Comparison: Chest x-ray November 18, 2022 Findings: The thyroid is unremarkable. The thoracic aorta is normal caliber. The great vessels are patent. Atherosclerosis of thoracic aorta. Within the superior left mediastinum and adjacent to the changes of coronary artery bypass grafting there is a well geographically defined heterogeneous fluid collection. The average Hounsfield units centrally within this collection measures proximally 35 consistent with complex fluid. Finding may be related to coronary bypass grafting. Recommend correlating with any prior CT imaging of the thorax. If none exists, recommend follow-up examination in 3 months to document stability of this finding. The fluid collection measures 4.2 x 3.5 x 3.8 cm and is best identified on axial image 21 of series 2. No suspicious peripheral nodularity or internal septations. Calcified granulomas of the right hilar region. The heart is normal size. Changes of coronary artery bypass grafting. Moderate to severe coronary atherosclerosis. Small pericardial effusion measuring up to 11 mm ventrally. Small right and moderate left pleural effusion. The left pleural effusion extends to the left lung apex. Passive atelectasis within the left lung base. Mild interstitial densities of the left lung base likely representing pulmonary edema. The stomach is nondistended. The bones are intact.  Median sternotomy wires. Spondylosis of the thoracic spine. Partially visualized ACDF of the cervical spine. 1. Small right and moderate left pleural effusion. The left pleural effusion extends to the left lung apex. Passive atelectasis within the left lung base. Mild interstitial densities of the left lung base likely representing pulmonary edema. Recommend continued follow-up to clearing of these findings. 2. Within the superior left mediastinum and adjacent to the coronary artery bypass grafting there is a well geographically defined heterogeneous fluid collection. The average Hounsfield units centrally within this collection measures proximally 35 consistent with complex fluid. Finding may be related to coronary bypass grafting. Recommend correlating with any prior CT imaging of the thorax. If none exists, recommend follow-up examination in 3 months to document stability of this finding. The fluid collection measures 4.2 x 3.5 x 3.8 cm and is best identified on axial image 21 of series 2. No suspicious peripheral nodularity or internal septations. 3. Moderate/severe coronary atherosclerosis. Small pericardial effusion measuring up to 11 mm ventrally. This document has been electronically signed by: Debra Saleh DO on 11/21/2022 06:15 AM All CTs at this facility use dose modulation techniques and iterative reconstructions, and/or weight-based dosing when appropriate to reduce radiation to a low as reasonably achievable. CT CERVICAL SPINE WO CONTRAST    Result Date: 11/21/2022  CT cervical spine without contrast Comparison: None Findings: ACDF of C5-7, the surgical hardware is intact. No acute fractures or dislocations. Spondylitic changes of the cervical spine including facet/uncovertebral joint arthropathy. Visualized intracranial contents are unremarkable. Soft tissues of the neck are normal. Partially visualized left pleural effusion extending to the left lung apex.      1. No acute fracture involving the cervical spine. 2. ACDF of C5-7, the surgical hardware is intact. 3. Spondylitic changes of the cervical spine including facet/uncovertebral joint arthropathy. 4. Partially visualized left pleural effusion extending to the left lung apex. This document has been electronically signed by: Haydee Parmar DO on 11/21/2022 06:05 AM All CTs at this facility use dose modulation techniques and iterative reconstructions, and/or weight-based dosing when appropriate to reduce radiation to a low as reasonably achievable. EKG:  NSR with new T wave inversions in V2, V3.      Electronically signed by Yuki Field PA-C on 11/21/2022 at 7:14 AM

## 2022-11-21 NOTE — Clinical Note
Discharge Plan[de-identified] Other/[facility])   Telemetry/Cardiac Monitoring Required?: Yes

## 2022-11-21 NOTE — CARE COORDINATION
11/21/22 1334   Readmission Assessment   Number of Days since last admission? 1-7 days   Previous Disposition Home with Home Health   Who is being Interviewed Patient   What was the patient's/caregiver's perception as to why they think they needed to return back to the hospital? Other (Comment)  (\"started falling, seizure like activity\")   Why weren't you able to visit your PCP? Other (Comment)  (had appointment, had just been discharged)   Did you see a specialist, such as Cardiac, Pulmonary, Orthopedic Physician, etc. after you left the hospital? No   Who advised the patient to return to the hospital? Caregiver   Does the patient report anything that got in the way of taking their medications? No   In our efforts to provide the best possible care to you and others like you, can you think of anything that we could have done to help you after you left the hospital the first time, so that you might not have needed to return so soon?  Other (Comment)

## 2022-11-21 NOTE — CARE COORDINATION
Case Management Assessment  Initial Evaluation    Date/Time of Evaluation: 11/21/2022 1:28 PM  Assessment Completed by: Mandi Torres RN    If patient is discharged prior to next notation, then this note serves as note for discharge by case management. Patient Name: Enrico Britt                   YOB: 1945  Diagnosis: Seizure [facility]) [R56.9]  Syncope, unspecified syncope type [R55]                   Date / Time: 11/21/2022  3:57 AM    Patient Admission Status: Inpatient     Current PCP: Annabelle Guzman MD  PCP verified by CM? Yes    Chart Reviewed: Yes      Patient Orientation: Alert and Oriented    Patient Cognition: Alert  History Provided by: Patient    Hospitalization in the last 30 days (Readmission):  Yes    If yes, Readmission Assessment in  Navigator will be completed. Advance Directives:     Code Status: Full Code     Primary Decision Maker: Jose Rafael Lynch Partner - [phone]    Discharge Planning  Patient lives with: Spouse/Significant Other Type of Home: House   Primary Caregiver: Self  Patient Support Systems include: Spouse/Significant Other   Current Financial resources: Medicare  Current community resources:  ([place] )  Current services prior to admission: Home Care ([place] )   Type of Home Care services:  Nursing Services    ADLS  Prior functional level: Assistance with the following:, Bathing  Current functional level: Assistance with the following:, Bathing      Family can provide assistance at DC: Yes  Would you like Case Management to discuss the discharge plan with any other family members/significant others, and if so, who?     Plans to Return to Present Housing: Yes  Other Identified Issues/Barriers to RETURNING to current housing: medical complications  Potential Assistance needed at discharge: N/A  Patient expects to discharge to: [address] [place] [address] for transportation at discharge: [address] [place] [address] ADVANTAGE / Plan: [employer] ADVANT[employer] CHOICE HMO / Product Type: *No Product type* /     Does insurance require precert for SNF: Yes    Potential assistance Purchasing Medications: No  Meds-to-Beds request:        [address] [facility], [place] [address]  [address]  Phone: [phone] Fax: [phone] [address] [facility], [address] [phone] - F [phone]  [facility] [address]  Phone: [phone] Fax: [phone]    Jessa Barakat [phone] - [address] [place], [place] [address] [phone] [place] [phone]  [address]  [address] [address]  Phone: [phone] Fax: [phone]    [address] [facility], [address] [address] [facility] [place] . [address] - F [address] [facility] [place] . [address] [address]  Phone: [phone] Fax: [phone]      Factors facilitating achievement of predicted outcomes: Family support    Barriers to discharge: Medical complications    Additional Case Management Notes: From ED, Acapella, telemetry, diabetes management, Cardiology, Neurology consults, Asa, Plavix, Lovenox, Lasix, electrolyte replacement protocols, Protonix. Procedure:   11/21 CT Head WO Contrast 1. No acute intracranial process. 2. Minimal changes of chronic microvascular ischemic disease and   age-related global volume loss. 11/21 CT Cervical spine WO Contrast 1. No acute fracture involving the cervical spine. 2. ACDF of C5-7, the surgical hardware is intact. 3. Spondylitic changes of the cervical spine including facet/uncovertebral   joint arthropathy. 4. Partially visualized left pleural effusion extending to the left lung   apex. 11/21 CT Chest W Contrast 1. Small right and moderate left pleural effusion. The left pleural   effusion extends to the left lung apex.  Passive atelectasis within the   left lung base. Mild interstitial densities of the left lung base likely   representing pulmonary edema. Recommend continued follow-up to clearing of   these findings. 2. Within the superior left mediastinum and adjacent to the coronary   artery bypass grafting there is a well geographically defined   heterogeneous fluid collection. The average Hounsfield units centrally   within this collection measures proximally 35 consistent with complex   fluid. Finding may be related to coronary bypass grafting. Recommend   correlating with any prior CT imaging of the thorax. If none exists,   recommend follow-up examination in 3 months to document stability of this   finding. The fluid collection measures 4.2 x 3.5 x 3.8 cm and is best   identified on axial image 21 of series 2. No suspicious peripheral   nodularity or internal septations. 3. Moderate/severe coronary atherosclerosis. Small pericardial effusion   measuring up to 11 mm ventrally. The Plan for Transition of Care is related to the following treatment goals of Seizure [facility]) [R56.9]  Syncope, unspecified syncope type [R55]    Patient Goals/Plan/Treatment Preferences: Met with Juan J Granados and his wife Rosa Pantoja. He lives at home with his wife. He uses a rollator and is current with [facility]. Plan is to return home at discharge and resume [facility] services. SW consulted. Will follow for potential needs. Transportation/Food Security/Housekeeping Addressed: No issues identified.      Hussain Boyd RN  Case Management Department

## 2022-11-21 NOTE — PROGRESS NOTES
[facility] Laboratory Technician Worksheet      EEG Date: 2022    Name: Romeo Alberts   : 1945   Age: 78 y.o. SEX: male    ROOM: [facility] MRN: [id number]           CSN: [id number]      Ordering Provider: Sarika Castaneda  EEG Number: [id number] Time of Test:  9070    Hand: Left   Sedation: no    H.V. Done: No  AGE PROTOCOL  Photic: Yes    Sleep: YES Drowsy: No   Sleep Deprived: No    Seizures observed: no    Mentality: alert      Clinical History:SYNCOPE, TWITCHES, \"FLOPPING LIKE A FISH\", CT  Impression   1. No acute intracranial process. 2. Minimal changes of chronic microvascular ischemic disease and    age-related global volume loss.        Past Medical History:       Diagnosis Date    Anxiety     Bradycardia 2016    Heart disease     Hyperlipidemia     Hypertension     Medtronic linq loop recorder 2016    Neuropathy     Paroxysmal atrial fibrillation (HCC) 2015    Spondylolisthesis     Syncopal episodes 2015       Scheduled Meds:   amiodarone  200 mg Oral Daily    aspirin EC  81 mg Oral Daily    clopidogrel  75 mg Oral Daily    vitamin B-12  1,000 mcg Oral Daily    DULoxetine  60 mg Oral Daily    omega-3 acid ethyl esters  1 g Oral Daily    [Held by provider] furosemide  20 mg Oral Daily    metoprolol tartrate  25 mg Oral BID    oxybutynin  5 mg Oral Nightly    [START ON 2022] pantoprazole  40 mg Oral QAM AC    pravastatin  80 mg Oral Nightly    tamsulosin  0.4 mg Oral Daily    sodium chloride flush  10 mL IntraVENous 2 times per day    enoxaparin  30 mg SubCUTAneous BID    furosemide  40 mg IntraVENous Once    sodium chloride flush  5-40 mL IntraVENous 2 times per day     Continuous Infusions:   sodium chloride      sodium chloride       PRN Meds:.oxyCODONE, sodium chloride flush, sodium chloride, ondansetron **OR** ondansetron, polyethylene glycol, acetaminophen **OR** acetaminophen, potassium chloride **OR** potassium alternative oral replacement **OR** potassium chloride, magnesium sulfate, albuterol sulfate HFA **AND** ipratropium, sodium chloride flush, sodium chloride    Technician: Lisandra Leong 11/21/2022

## 2022-11-21 NOTE — CONSULTS
Neurology Consult Note    Date:11/21/2022       LUKN:[id number]  Patient Name:Gutierrez Dumont     Date of Birth:[birth year]     Age:77 y.o. Requesting Physician: Madeleine Robb PA-C     Reason for Consult:  Evaluate for Seizure      Chief Complaint: Seizure    Subjective     Susy Meza is a 72-year-old  male with past medical history significant for coronary artery disease with PCI and CABG earlier this month, hyperlipidemia, hypertension, neuropathy, A. fib, syncopal episodes who presented to [facility] ED with complaint of 5 episodes of loss of consciousness with tonic-clonic movements and urinary incontinence. Patient states that around 11:30 in the evening on Saturday patient was standing up in the kitchen felt and \"odd dizzy sensation in [his] head\" and then fell on the ground with jerking of both arms and both legs, arching of the back and incontinence of urine. This episode lasted for about 1 minute and resolved spontaneously. The patient was confused and very tired afterward. This recurred 4 more times in the next 28 hours and after the fifth episode the patient and his wife came in for evaluation for this. Patient denies trauma, exposure to any chemicals or having any drugs and alcohol. Review of Systems   Review of Systems   Constitutional:  Negative for activity change, fatigue and fever. HENT:  Negative for tinnitus and trouble swallowing. Eyes:  Negative for photophobia and visual disturbance. Respiratory:  Negative for cough and shortness of breath. Cardiovascular:  Negative for chest pain and palpitations. Gastrointestinal:  Negative for diarrhea, nausea and vomiting. Genitourinary:  Negative for dysuria and flank pain. Musculoskeletal:  Negative for neck pain and neck stiffness. Skin:  Negative for color change and rash. Neurological:  Positive for dizziness and seizures. Negative for facial asymmetry, speech difficulty, weakness, numbness and headaches. Psychiatric/Behavioral:  Positive for confusion. Negative for agitation. Medications   Scheduled Meds:    amiodarone  200 mg Oral Daily    aspirin EC  81 mg Oral Daily    clopidogrel  75 mg Oral Daily    vitamin B-12  1,000 mcg Oral Daily    DULoxetine  60 mg Oral Daily    omega-3 acid ethyl esters  1 g Oral Daily    [Held by provider] furosemide  20 mg Oral Daily    metoprolol tartrate  25 mg Oral BID    oxybutynin  5 mg Oral Nightly    [START ON 11/22/2022] pantoprazole  40 mg Oral QAM AC    pravastatin  80 mg Oral Nightly    tamsulosin  0.4 mg Oral Daily    sodium chloride flush  10 mL IntraVENous 2 times per day    enoxaparin  30 mg SubCUTAneous BID    sodium chloride flush  5-40 mL IntraVENous 2 times per day     Continuous Infusions:    sodium chloride      sodium chloride       PRN Meds: oxyCODONE, sodium chloride flush, sodium chloride, ondansetron **OR** ondansetron, polyethylene glycol, acetaminophen **OR** acetaminophen, potassium chloride **OR** potassium alternative oral replacement **OR** potassium chloride, magnesium sulfate, albuterol sulfate HFA **AND** ipratropium, sodium chloride flush, sodium chloride    Past History    Past Medical History:   has a past medical history of Anxiety, Bradycardia, Heart disease, Hyperlipidemia, Hypertension, Medtronic linq loop recorder, Neuropathy, Paroxysmal atrial fibrillation (HCC), Spondylolisthesis, and Syncopal episodes. Social History:   reports that he quit smoking about 22 years ago. His smoking use included cigarettes. He has a 75.00 pack-year smoking history. He has never used smokeless tobacco. He reports current alcohol use. He reports that he does not use drugs.      Family History:   Family History   Problem Relation Age of Onset    Heart Disease Mother 62        CABG    High Cholesterol Mother     Cancer Mother     Heart Disease Father 61        stents    Cancer Father     Cancer Brother     Asthma Brother        Physical Examination Vitals:  BP (!) 140/72   Pulse 72   Temp 98.5 °F (36.9 °C) (Oral)   Resp 18   Ht 5' 6\" (1.676 m)   Wt 228 lb 13.4 oz (103.8 kg)   SpO2 97%   BMI 36.94 kg/m²   Temp (24hrs), Av.3 °F (36.8 °C), Min:98 °F (36.7 °C), Max:98.5 °F (36.9 °C)      I/O (24Hr): Intake/Output Summary (Last 24 hours) at 2022 1850  Last data filed at 2022 1419  Gross per 24 hour   Intake 400 ml   Output --   Net 400 ml         Physical Exam  Vitals reviewed. Constitutional:       Appearance: Normal appearance. HENT:      Head: Normocephalic and atraumatic. Right Ear: External ear normal.      Left Ear: External ear normal.      Nose: Nose normal.      Mouth/Throat:      Mouth: Mucous membranes are moist.      Pharynx: Oropharynx is clear. Eyes:      Extraocular Movements: Extraocular movements intact and EOM normal.      Pupils: Pupils are equal, round, and reactive to light. Cardiovascular:      Rate and Rhythm: Normal rate and regular rhythm. Pulmonary:      Effort: Pulmonary effort is normal. No respiratory distress. Abdominal:      General: There is no distension. Palpations: Abdomen is soft. Tenderness: There is no abdominal tenderness. Musculoskeletal:         General: No deformity or signs of injury. Cervical back: No rigidity or tenderness. Skin:     General: Skin is warm and dry. Neurological:      Mental Status: He is alert and oriented to person, place, and time. Motor: Motor strength is normal.      Coordination: Finger-Nose-Finger Test and Heel to Shin Test normal.   Psychiatric:         Mood and Affect: Mood normal.         Speech: Speech normal.         Behavior: Behavior normal.         Thought Content: Thought content normal.     Neurologic Exam     Mental Status   Oriented to person, place, and time. Follows 2 step commands. Attention: normal.   Speech: speech is normal   Level of consciousness: alert  Knowledge: good. Able to name object.  Able to read. Able to repeat. Cranial Nerves     CN II   Visual fields full to confrontation. CN III, IV, VI   Pupils are equal, round, and reactive to light. Extraocular motions are normal.     CN V   Facial sensation intact. CN VII   Facial expression full, symmetric. CN VIII   CN VIII normal.   Hearing: intact    CN IX, X   CN IX normal.   Palate: symmetric    CN XI   CN XI normal.   Right sternocleidomastoid strength: normal  Left sternocleidomastoid strength: normal  Right trapezius strength: normal  Left trapezius strength: normal    CN XII   CN XII normal.   Tongue: not atrophic    Motor Exam   Muscle bulk: normal  Overall muscle tone: normal    Strength   Strength 5/5 throughout. Sensory Exam   Right arm light touch: normal  Left arm light touch: normal  Right leg light touch: decreased from knee  Left leg light touch: decreased from knee    Gait, Coordination, and Reflexes     Coordination   Finger to nose coordination: normal  Heel to shin coordination: normal       Labs/Imaging/Diagnostics   Labs:  CBC:  Recent Labs     11/21/22 0419   WBC 10.1   RBC 2.99*   HGB 9.5*   HCT 29.8*   MCV 99.7*   *     CHEMISTRIES:  Recent Labs     11/21/22 0419      K 4.0      CO2 23   BUN 15   CREATININE 1.2   GLUCOSE 137*   MG 2.0     PT/INR:  Recent Labs     11/21/22 0419 11/21/22  1450   INR 1.10 1.14*     APTT:No results for input(s): APTT in the last 72 hours. LIVER PROFILE:  Recent Labs     11/21/22 0419   AST 21   ALT 22   BILITOT 0.5   ALKPHOS 58       Imaging Last 24 Hours:  CT HEAD WO CONTRAST    Result Date: 11/21/2022  CT head without contrast Comparison: None Findings: No intracranial mass, midline shift, hydrocephalus, or acute hemorrhage. Minimal changes of chronic microvascular ischemic disease and age-related global volume loss. Minimal mucosal thickening of the bilateral maxillary sinuses and ethmoid sinuses. The orbits are within normal limits. No acute fracture. 1. No acute intracranial process. 2. Minimal changes of chronic microvascular ischemic disease and age-related global volume loss. This document has been electronically signed by: Yasmeen Pathak DO on 11/21/2022 05:58 AM All CTs at this facility use dose modulation techniques and iterative reconstructions, and/or weight-based dosing when appropriate to reduce radiation to a low as reasonably achievable. CT CHEST W CONTRAST    Result Date: 11/21/2022  CT chest with contrast. Comparison: Chest x-ray November 18, 2022 Findings: The thyroid is unremarkable. The thoracic aorta is normal caliber. The great vessels are patent. Atherosclerosis of thoracic aorta. Within the superior left mediastinum and adjacent to the changes of coronary artery bypass grafting there is a well geographically defined heterogeneous fluid collection. The average Hounsfield units centrally within this collection measures proximally 35 consistent with complex fluid. Finding may be related to coronary bypass grafting. Recommend correlating with any prior CT imaging of the thorax. If none exists, recommend follow-up examination in 3 months to document stability of this finding. The fluid collection measures 4.2 x 3.5 x 3.8 cm and is best identified on axial image 21 of series 2. No suspicious peripheral nodularity or internal septations. Calcified granulomas of the right hilar region. The heart is normal size. Changes of coronary artery bypass grafting. Moderate to severe coronary atherosclerosis. Small pericardial effusion measuring up to 11 mm ventrally. Small right and moderate left pleural effusion. The left pleural effusion extends to the left lung apex. Passive atelectasis within the left lung base. Mild interstitial densities of the left lung base likely representing pulmonary edema. The stomach is nondistended. The bones are intact. Median sternotomy wires. Spondylosis of the thoracic spine.  Partially visualized ACDF of the cervical spine.     1. Small right and moderate left pleural effusion. The left pleural effusion extends to the left lung apex. Passive atelectasis within the left lung base. Mild interstitial densities of the left lung base likely representing pulmonary edema. Recommend continued follow-up to clearing of these findings. 2. Within the superior left mediastinum and adjacent to the coronary artery bypass grafting there is a well geographically defined heterogeneous fluid collection. The average Hounsfield units centrally within this collection measures proximally 35 consistent with complex fluid. Finding may be related to coronary bypass grafting. Recommend correlating with any prior CT imaging of the thorax. If none exists, recommend follow-up examination in 3 months to document stability of this finding. The fluid collection measures 4.2 x 3.5 x 3.8 cm and is best identified on axial image 21 of series 2. No suspicious peripheral nodularity or internal septations. 3. Moderate/severe coronary atherosclerosis. Small pericardial effusion measuring up to 11 mm ventrally. This document has been electronically signed by: Kiesha Arana DO on 11/21/2022 06:15 AM All CTs at this facility use dose modulation techniques and iterative reconstructions, and/or weight-based dosing when appropriate to reduce radiation to a low as reasonably achievable. CT CERVICAL SPINE WO CONTRAST    Result Date: 11/21/2022  CT cervical spine without contrast Comparison: None Findings: ACDF of C5-7, the surgical hardware is intact. No acute fractures or dislocations. Spondylitic changes of the cervical spine including facet/uncovertebral joint arthropathy. Visualized intracranial contents are unremarkable. Soft tissues of the neck are normal. Partially visualized left pleural effusion extending to the left lung apex. 1. No acute fracture involving the cervical spine. 2. ACDF of C5-7, the surgical hardware is intact.  3. Spondylitic changes of the cervical spine including facet/uncovertebral joint arthropathy. 4. Partially visualized left pleural effusion extending to the left lung apex. This document has been electronically signed by: Alphonse Magana DO on 11/21/2022 06:05 AM All CTs at this facility use dose modulation techniques and iterative reconstructions, and/or weight-based dosing when appropriate to reduce radiation to a low as reasonably achievable. Assessment and Plan:        Hospital Problems             Last Modified POA    * (Principal) Syncope, unspecified syncope type 11/21/2022 Yes    Seizure ([address] 11/21/2022 Yes    Elevated troponin 11/21/2022 Yes    Abnormal EKG 11/21/2022 Yes       Seizure  CTH- no ICH, midline shift, mass effect  MRI Brain ordered  EEG-normal  Infectious/Metabolic Work Up  COVID+ on 11/16  WBC WNL  Mg 2  Ca 8.7  Start Keppra 2500 x1  Continue Keppra 1000mg BID starting tomorrow  Neurology following    This case was discussed with Dr. Chanel Regan and he is in agreement with the assessment and plan.         Electronically signed by Keeley Evans PA-C on 11/21/22 at 6:50 PM EST

## 2022-11-21 NOTE — ED TRIAGE NOTES
Patient arrives with c/o seizure like activity the last 3 days. Reports multiple episodes today.  A&O; RR even and unlabored

## 2022-11-21 NOTE — ED PROVIDER NOTES
[address] [facility] [address] [address] EMERGENCY DEPT  [address] [facility] [address]  Phone: [phone]      CHIEF COMPLAINT       Chief Complaint   Patient presents with    Seizures       Nurses Notes reviewed and I agree except as noted in the HPI. HISTORY OF PRESENT ILLNESS    Sunny Jaimes is a 78 y.o. male. This patient was just discharged from the hospital last Friday. He had a complex cardiac admission. He initially went to the Cath Lab had stenting placed and did end up going for a bypass. Over the last 2 days he has had multiple episodes that the wife thought might be seizures. He has fallen multiple times. Other times he has been confused and he has had some shaking. There may have been loss of consciousness during these episodes with the shaking. He does not have any prior seizure history. It looks like he is got some bruising over the anterior chest wall that might be new after the fall. Is not believed to be on any anticoagulants. No reported history of parkinsonism. REVIEW OF SYSTEMS         Patient complains of generalized body aches but no localized pain to the extremities. Remainder of review of systems is otherwise reviewed as negative. PAST MEDICAL HISTORY    has a past medical history of Anxiety, Bradycardia, Heart disease, Hyperlipidemia, Hypertension, Medtronic linq loop recorder, Neuropathy, Paroxysmal atrial fibrillation ([address] Spondylolisthesis, and Syncopal episodes. SURGICAL HISTORY      has a past surgical history that includes Eye surgery; hernia repair; back surgery (2008); Cholecystectomy; angioplasty (07' 10'); Tonsillectomy; Knee arthroscopy (1990); eye surgery; Coronary angioplasty with stent (9951,7882); TURP (2015); Shoulder arthroscopy; Cataract removal (05/04/2021); and Coronary artery bypass graft (N/A, 11/8/2022).     CURRENT MEDICATIONS       Previous Medications    ALBUTEROL-IPRATROPIUM (COMBIVENT RESPIMAT)  MCG/ACT AERS INHALER    Inhale 1 puff into the lungs every 4 hours as needed for Wheezing or Shortness of Breath    AMIODARONE (CORDARONE) 200 MG TABLET    Take 1 tablet by mouth daily    ASPIRIN EC 81 MG EC TABLET    Take 1 tablet by mouth daily    CLOPIDOGREL (PLAVIX) 75 MG TABLET    Take 1 tablet by mouth daily    CYANOCOBALAMIN (VITAMIN B 12 PO)    Take 1,000 mg by mouth daily    DULOXETINE (CYMBALTA) 30 MG EXTENDED RELEASE CAPSULE    Take 60 mg by mouth daily     FISH OIL-OMEGA-3 FATTY ACIDS 1000 MG CAPSULE    Take 1 g by mouth daily     FUROSEMIDE (LASIX) 20 MG TABLET    Take 1 tablet by mouth daily    METOPROLOL TARTRATE (LOPRESSOR) 25 MG TABLET    Take 1 tablet by mouth 2 times daily    OXYBUTYNIN (DITROPAN) 5 MG TABLET    Take 5 mg by mouth nightly    OXYCODONE (ROXICODONE) 5 MG IMMEDIATE RELEASE TABLET    Take 1 tablet by mouth every 8 hours as needed for Pain for up to 7 days. PANTOPRAZOLE SODIUM (PROTONIX) 40 MG PACK PACKET    Take 40 mg by mouth every morning (before breakfast)    POTASSIUM CHLORIDE (KLOR-CON M) 20 MEQ EXTENDED RELEASE TABLET    Take 1 tablet by mouth daily    PRAVASTATIN (PRAVACHOL) 80 MG TABLET    Take 1 tablet by mouth once daily    PREGABALIN (LYRICA) 50 MG CAPSULE    Take 100 mg by mouth 3 times daily. TAMSULOSIN (FLOMAX) 0.4 MG CAPSULE    Take 0.4 mg by mouth daily       ALLERGIES     has No Known Allergies. FAMILY HISTORY     He indicated that his mother is . He indicated that his father is . He indicated that his brother is . family history includes Asthma in his brother; Cancer in his brother, father, and mother; Heart Disease (age of onset: 62) in his mother; Heart Disease (age of onset: 61) in his father; High Cholesterol in his mother. SOCIAL HISTORY      reports that he quit smoking about 22 years ago. His smoking use included cigarettes. He has a 75.00 pack-year smoking history. He has never used smokeless tobacco. He reports current alcohol use.  He reports that he does not use drugs.    PHYSICAL EXAM     INITIAL VITALS:  height is 5' 6\" (1.676 m) and weight is 230 lb (104.3 kg). His axillary temperature is 98 °F (36.7 °C). His blood pressure is 129/71 and his pulse is 68. His respiration is 18 and oxygen saturation is 96%. Constitutional: Somewhat chronically ill-appearing  Eyes:  Pupils are equal and reactive, extraocular muscles intact   HENT: No crepitance or step-off on the face. No hematomas or lacerations noted. external ears normal,   oropharynx moist, no pharyngeal exudates. Neck- normal range of motion, no tenderness, supple   Respiratory:  No wheezing, rhonchi or rales  Cardiovascular: regular  Bruising is noted around the incisional area. GI:  Non tender, no rigidity, rebound or guarding  Musculoskeletal:  2/4 distal pulses, +2  pitting edema  Integument: warm and dry, no rash   Lymphatic:  No lymphadenopathy noted   Neurologic:  Alert & oriented x 3, cranial nerves II through XII are grossly intact. Equal strength in the upper and lower extremities bilaterally. Appears to be resting tremor. Psychiatric:  Speech and behavior appropriate        DIAGNOSTIC RESULTS     EKG: All EKG's are interpreted by the Emergency Department Physician who either signs or Co-signs this chart in the absence of a cardiologist.      EKG interpreted by me showing sinus rhythm at a rate of 62, QRS of 80, QTc of 4-30, axis of 23 T wave inversions anteriorly. RADIOLOGY: non-plain film images(s) such as CT, Ultrasound and MRI are read by the radiologist.  CT of the head and cervical spine with no acute traumatic injury.   CT of the chest fluid collection noted    LABS:   Labs Reviewed   CBC WITH AUTO DIFFERENTIAL   COMPREHENSIVE METABOLIC PANEL   ETHANOL   TROPONIN   PROTIME-INR   BRAIN NATRIURETIC PEPTIDE   GLOMERULAR FILTRATION RATE, ESTIMATED   POCT GLUCOSE       EMERGENCY DEPARTMENT COURSE:   Vitals:    Vitals:    11/21/22 0403   BP: 129/71   Pulse: 68   Resp: 18   Temp: 98 °F (36.7 °C) TempSrc: Axillary   SpO2: 96%   Weight: 230 lb (104.3 kg)   Height: 5' 6\" (1.676 m)     Patient has  remained clinically stable for us on the monitor. He has not had any further neurological changes. Its not clear whether this was seizures or syncope. I think he should stay on a cardiac monitor tonight. Case discussed with the hospitalist arrange for admission. We do note that he tested positive for COVID within the last few days but does not appear to have any significant hypoxemia.       CRITICAL CARE:   none        FINAL IMPRESSION    Syncope versus seizure    DISPOSITION/PLAN   admit    DISCHARGE MEDICATIONS:  New Prescriptions    No medications on file       (Please note that portions of this note were completed with a voice recognition program.  Efforts were made to edit the dictations but occasionally words are mis-transcribed.)    Kwadwo Alan, [address], DO  11/21/22 3442

## 2022-11-21 NOTE — CARE COORDINATION
11/21/22, 11:42 AM EST    DISCHARGE PLANNING EVALUATION    Made [place]  aware that patient has been admitted and would not need seen today.

## 2022-11-21 NOTE — FLOWSHEET NOTE
11/21/22 1017   Safe Environment   Safety Measures Other (comment)  (vn completed admission required documents with pt and his spouse , role and self identified and explained , pt accepts services)   Call placed , pt responds to audio , permits video . Pt denied any voiced concerns or complaints at this time , call light within reach , no s/s distress noted .

## 2022-11-21 NOTE — ED NOTES
Bladder scan completed at this time. Patient had 51 mL.      Regan Apgar, RN  [id number]
Blood glucose 138 at this time and EKG obtained.      Ezequiel Whitmore RN  11/21/22 9917
Patient given warm blanket at this time.      Nasir Hernandez RN  11/21/22 9212
Patient resting in bed. Respirations easy and unlabored. No distress noted. Call light within reach.      Jenny Chavis RN  11/21/22 5903
Pt transported to [address] [place] [address] Rd, EMT-P  11/21/22 9164
[FreeTextEntry1] : Ms. Bo is a 61 year old female with a history of former smoker, COPD, respiratory failure for 10 years, tracheostomy,  tracheostenosis and tracheomalacia who is s/p bronchoscopy with revision of the tracheostomy with dilation of the tracheostomy. She is s/p ENT visit. She presents with tracheomalacia COPD end stage with issues with ventilator. She is symptomatic with difficulty breathing.\par \par Her chronic respiratory failure is multifactorial due to:\par -underlying COPD/severe persistent asthma\par -respiratory muscle weakness\par -tracheomalacia\par \par problem 1: tracheostomy/ chronic respiratory failure \par -Continue to wean off the ventilator as tolerable in short trials throughout the day  \par -recommended to use hypertonic saline in clearing of tracheostomy \par -Rx given for elastic pads, dressings, disposable inner cannulas, and tracheostomy collar to improve collar. Rx given for Metaplex Lite. \par -recommended f/u with Dr. Brett Laughlin and Dr. Edwrad Rodriguez\par \par Problem 2: Chronic Respiratory Failure \par - Trach collar- goal up to 12hrs per day (at 2 hours now)\par - 3 L SIMV: VT- 450; PC 20; PS-18; PEEP- 5\par - Night CPAP 5/PS 18 (back up 8 B/mm)\par \par problem 3:  COPD/asthma - active\par -continue Performist nebulizer 1 unit dose BID, followed by Pulmicort 0.5 nebulizer BID \par -continue Albuterol via nebulizer for rescue up to QiD \par -continue on NAC Q8H\par -add Yulerpi 1 unit dose QD\par \par -COPD is a progressive disease and although it can’t be cured , appropriate management can slow its progression, reduce frequency and severity of exacerbations, and improve symptoms and the patient quality of life. Hospitalizations are the greatest contributor to the total COPD costs and account for up to 87% of total COPD related costs. Exacerbations are the main cause of admissions and subsequently account for the 40-75% of COPD costs. Inhaled maintenance therapy reduces the incidence of exacerbations in patients with stable COPD. Incorrect inhaler use and nonadherence are major obstacles to achieving COPD treatment goals. Many COPD patients have challenges (impaired inhalation, limited dexterity, reduced cognition: that limit their ability to correctly use their COPD treatment devices resulting in reduced symptom control. Of most importance is smoking cessation and early intervention with respiratory illnesses and contemplation for pulmonary rehab to enhance quality of life.\par \par problem 4: GERD\par -continue Ranitidine 150mg BID\par - Continue Protonix 40 mg qAM\par -add Baclofen 5 mg premeal, QHS \par \par -Things to avoid including overeating, spicy foods, tight clothing, eating within three hours of bed, this list is not all inclusive. \par -For treatment of reflux, possible options discussed including diet control, H2 blockers, PPIs, as well as coating motility agents discussed as treatment options. Timing of meals and proximity of last meal to sleep were discussed. If symptoms persist, a formal gastrointestinal evaluation is needed.\par -Rule of 2's: Avoid eating too late, too fast, too much, too spicy or within two hours of bedtime\par \par Problem 5: Immunodeficiency\par - Continue Zithromax 250 mg Monday, Wednesday, and Friday\par -Due to the fact that this pt has had more infections than would be expected and immunological blood work is indicated this would include: IgG subclasses, quantitative immunoglobulins, Strep pneumoniae titers as well as Vitamin D levels. Based on this blood work we will be able to decide where the pt needs additional pneumococcal vaccine either Prevnar 13 or Pneumovax. Immunology evaluation will also be potentially indicated. \par \par Problem 6: Sensory neuropathic Cough\par - Continue Amitriptyline 10 mg up to TID\par -Sensory neuropathic cough is an etiology of cough that is often realized once someone has been ruled out for common disease such as: asthma, COPD, eosinophilic bronchitis, bronchiectasis, post nasal drip, and GERD. It sometimes develops following a URI, herpes zoster outbreak in pharynx or thyroid or cervical spine injury. However, many patients have no identifiable antecedent explanation. \par \par problem 7: tracheomalacia/tracheal tumor \par -follow up with Dr. eKlby Larios for thoracic evaluation for potential tracheoplasty - s/p new tracheostomy\par \par Tracheomalacia is usually acquired in adults and common causes include damage by tracheostomy or endotracheal intubation damaging the tracheal cartilage with increase risk with multiple intubations, prolonged intubation, and concurrent high dose steroid therapy; external chest wall trauma and surgery; chronic compression of the trachea by benign etiologies (eg, benign mediastinal goiter) or malignancy; relapsing polychondritis; or recurrent infection. Tracheomalacia can be asymptomatic, however signs or symptoms can develop as the severity of the airway narrowing progresses with major symptoms include dyspnea, cough, and sputum retention. Other symptoms include severe paroxysms of coughing, wheezing or stridor, barking cough and may be exacerbated by forced expiration, cough, and Valsalva maneuver. Tracheomalacia is diagnosed by a bronchoscopic visualization of dynamic airway collapse on dynamic chest CT. Therapy is warranted in symptomatic patients with severe tracheomalacia and includes surgical repair as tracheobronchoplasty. The patient was referred to Dr. Willy Kay or Dr. Kelby Larios, at [facility] for a surgical consult. \par \par problem 8: dysphonia/sore throat\par -recommended Fisherman Friend's lozenges \par \par problem 9: health maintenance \par - recommended to f/u with Dr. Palomares(GI) \par -instructed to increase physical activity as much as possible\par - She was administered an influenza vaccination today 11/26/2018\par -recommended strep pneumonia vaccines: Prevnar-13 vaccine, followed by Pneumo vaccine 23 one year following\par -recommended early intervention for URIs\par -recommended regular osteoporosis evaluations\par -recommended early dermatological evaluations\par -recommended after the age of 50 to the age of 70, colonoscopy every 5 years \par \par \par Follow up in 2 months.\par Patient is encouraged to call with any changes, concerns or questions.

## 2022-11-21 NOTE — PROGRESS NOTES
Patient admitted to 4A Room 11 in a wheelchair and from ED. Complaint upon arrival to the room: None  IV site free of s/s of infection or infiltration. Vital signs obtained. Assessment and data collection initiated. Oriented to room. Policies and procedures for 4A explained. All questions answered with no further questions at this time. Fall prevention and safety brochure discussed with patient. 2 person skin check completed with LYRIC Townsend.

## 2022-11-22 ENCOUNTER — APPOINTMENT (OUTPATIENT)
Dept: MRI IMAGING | Age: 77
DRG: 100 | End: 2022-11-22
Payer: MEDICARE

## 2022-11-22 ENCOUNTER — APPOINTMENT (OUTPATIENT)
Dept: GENERAL RADIOLOGY | Age: 77
DRG: 100 | End: 2022-11-22
Payer: MEDICARE

## 2022-11-22 ENCOUNTER — APPOINTMENT (OUTPATIENT)
Dept: ULTRASOUND IMAGING | Age: 77
DRG: 100 | End: 2022-11-22
Payer: MEDICARE

## 2022-11-22 LAB
ALBUMIN SERPL-MCNC: 3.7 G/DL (ref 3.5–5.1)
ALP BLD-CCNC: 57 U/L (ref 38–126)
ALT SERPL-CCNC: 18 U/L (ref 11–66)
ANION GAP SERPL CALCULATED.3IONS-SCNC: 11 MEQ/L (ref 8–16)
AST SERPL-CCNC: 19 U/L (ref 5–40)
BASOPHILS # BLD: 0.6 %
BASOPHILS ABSOLUTE: 0 THOU/MM3 (ref 0–0.1)
BILIRUB SERPL-MCNC: 0.6 MG/DL (ref 0.3–1.2)
BODY FLUID RBC: ABNORMAL /CUMM
BUN BLDV-MCNC: 14 MG/DL (ref 7–22)
CALCIUM SERPL-MCNC: 8.9 MG/DL (ref 8.5–10.5)
CHARACTER, BODY FLUID: ABNORMAL
CHLORIDE BLD-SCNC: 107 MEQ/L (ref 98–111)
CO2: 22 MEQ/L (ref 23–33)
COLOR: ABNORMAL
CREAT SERPL-MCNC: 1.1 MG/DL (ref 0.4–1.2)
EKG ATRIAL RATE: 61 BPM
EKG ATRIAL RATE: 62 BPM
EKG ATRIAL RATE: 69 BPM
EKG P AXIS: 42 DEGREES
EKG P AXIS: 54 DEGREES
EKG P AXIS: 61 DEGREES
EKG P-R INTERVAL: 186 MS
EKG P-R INTERVAL: 192 MS
EKG P-R INTERVAL: 208 MS
EKG Q-T INTERVAL: 392 MS
EKG Q-T INTERVAL: 438 MS
EKG Q-T INTERVAL: 472 MS
EKG QRS DURATION: 88 MS
EKG QRS DURATION: 90 MS
EKG QRS DURATION: 94 MS
EKG QTC CALCULATION (BAZETT): 420 MS
EKG QTC CALCULATION (BAZETT): 444 MS
EKG QTC CALCULATION (BAZETT): 475 MS
EKG R AXIS: 14 DEGREES
EKG R AXIS: 29 DEGREES
EKG R AXIS: 44 DEGREES
EKG T AXIS: 103 DEGREES
EKG T AXIS: 110 DEGREES
EKG T AXIS: 88 DEGREES
EKG VENTRICULAR RATE: 61 BPM
EKG VENTRICULAR RATE: 62 BPM
EKG VENTRICULAR RATE: 69 BPM
EOSINOPHIL # BLD: 2.2 %
EOSINOPHILS ABSOLUTE: 0.2 THOU/MM3 (ref 0–0.4)
ERYTHROCYTE [DISTWIDTH] IN BLOOD BY AUTOMATED COUNT: 15.1 % (ref 11.5–14.5)
ERYTHROCYTE [DISTWIDTH] IN BLOOD BY AUTOMATED COUNT: 53.2 FL (ref 35–45)
GFR SERPL CREATININE-BSD FRML MDRD: > 60 ML/MIN/1.73M2
GLUCOSE BLD-MCNC: 109 MG/DL (ref 70–108)
GLUCOSE BLD-MCNC: 116 MG/DL (ref 70–108)
GLUCOSE BLD-MCNC: 125 MG/DL (ref 70–108)
GLUCOSE BLD-MCNC: 129 MG/DL (ref 70–108)
GLUCOSE BLD-MCNC: 132 MG/DL (ref 70–108)
GLUCOSE, FLUID: 127 MG/DL
HCT VFR BLD CALC: 29.9 % (ref 42–52)
HEMOGLOBIN: 9.6 GM/DL (ref 14–18)
IMMATURE GRANS (ABS): 0.06 THOU/MM3 (ref 0–0.07)
IMMATURE GRANULOCYTES: 0.7 %
LD, FLUID: 520 U/L
LYMPHOCYTES # BLD: 19.5 %
LYMPHOCYTES ABSOLUTE: 1.6 THOU/MM3 (ref 1–4.8)
MAGNESIUM: 2 MG/DL (ref 1.6–2.4)
MCH RBC QN AUTO: 31.9 PG (ref 26–33)
MCHC RBC AUTO-ENTMCNC: 32.1 GM/DL (ref 32.2–35.5)
MCV RBC AUTO: 99.3 FL (ref 80–94)
MESOTHELIAL CELLS BODY FLUID: ABNORMAL
MONOCYTES # BLD: 6.7 %
MONOCYTES ABSOLUTE: 0.6 THOU/MM3 (ref 0.4–1.3)
MONONUCLEAR CELLS BODY FLUID: 66.3 %
NUCLEATED RED BLOOD CELLS: 0 /100 WBC
PATHOLOGIST REVIEW: ABNORMAL
PH FLUID: 7.59
PLATELET # BLD: 450 THOU/MM3 (ref 130–400)
PMV BLD AUTO: 9.6 FL (ref 9.4–12.4)
POLYMORPHONUCLEAR CELLS BODY FLUID: 33.7 %
POTASSIUM REFLEX MAGNESIUM: 3.4 MEQ/L (ref 3.5–5.2)
PROTEIN FLUID: 3.8 GM/DL
RBC # BLD: 3.01 MILL/MM3 (ref 4.7–6.1)
SEG NEUTROPHILS: 70.3 %
SEGMENTED NEUTROPHILS ABSOLUTE COUNT: 5.8 THOU/MM3 (ref 1.8–7.7)
SODIUM BLD-SCNC: 140 MEQ/L (ref 135–145)
SPECIMEN: ABNORMAL
TOTAL NUCLEATED CELLS BODY FLUID: 1487 /CUMM (ref 0–500)
TOTAL PROTEIN: 6.1 G/DL (ref 6.1–8)
TOTAL VOLUME RECEIVED BODY FLUID: 73 ML
TROPONIN T: 0.03 NG/ML
TROPONIN T: 0.03 NG/ML
WBC # BLD: 8.3 THOU/MM3 (ref 4.8–10.8)

## 2022-11-22 PROCEDURE — 6360000002 HC RX W HCPCS

## 2022-11-22 PROCEDURE — 70553 MRI BRAIN STEM W/O & W/DYE: CPT

## 2022-11-22 PROCEDURE — 83735 ASSAY OF MAGNESIUM: CPT

## 2022-11-22 PROCEDURE — 88112 CYTOPATH CELL ENHANCE TECH: CPT

## 2022-11-22 PROCEDURE — 0W9B3ZZ DRAINAGE OF LEFT PLEURAL CAVITY, PERCUTANEOUS APPROACH: ICD-10-PCS | Performed by: RADIOLOGY

## 2022-11-22 PROCEDURE — 83615 LACTATE (LD) (LDH) ENZYME: CPT

## 2022-11-22 PROCEDURE — 6370000000 HC RX 637 (ALT 250 FOR IP): Performed by: SOCIAL WORKER

## 2022-11-22 PROCEDURE — 97162 PT EVAL MOD COMPLEX 30 MIN: CPT

## 2022-11-22 PROCEDURE — 6370000000 HC RX 637 (ALT 250 FOR IP)

## 2022-11-22 PROCEDURE — 93005 ELECTROCARDIOGRAM TRACING: CPT

## 2022-11-22 PROCEDURE — 84484 ASSAY OF TROPONIN QUANT: CPT

## 2022-11-22 PROCEDURE — 6360000004 HC RX CONTRAST MEDICATION: Performed by: SOCIAL WORKER

## 2022-11-22 PROCEDURE — 32555 ASPIRATE PLEURA W/ IMAGING: CPT

## 2022-11-22 PROCEDURE — 2580000003 HC RX 258

## 2022-11-22 PROCEDURE — 93010 ELECTROCARDIOGRAM REPORT: CPT | Performed by: INTERNAL MEDICINE

## 2022-11-22 PROCEDURE — 88108 CYTOPATH CONCENTRATE TECH: CPT

## 2022-11-22 PROCEDURE — 87075 CULTR BACTERIA EXCEPT BLOOD: CPT

## 2022-11-22 PROCEDURE — 87102 FUNGUS ISOLATION CULTURE: CPT

## 2022-11-22 PROCEDURE — 82948 REAGENT STRIP/BLOOD GLUCOSE: CPT

## 2022-11-22 PROCEDURE — 85025 COMPLETE CBC W/AUTO DIFF WBC: CPT

## 2022-11-22 PROCEDURE — 80053 COMPREHEN METABOLIC PANEL: CPT

## 2022-11-22 PROCEDURE — 87070 CULTURE OTHR SPECIMN AEROBIC: CPT

## 2022-11-22 PROCEDURE — 99233 SBSQ HOSP IP/OBS HIGH 50: CPT | Performed by: HOSPITALIST

## 2022-11-22 PROCEDURE — 89050 BODY FLUID CELL COUNT: CPT

## 2022-11-22 PROCEDURE — 88185 FLOWCYTOMETRY/TC ADD-ON: CPT

## 2022-11-22 PROCEDURE — 84157 ASSAY OF PROTEIN OTHER: CPT

## 2022-11-22 PROCEDURE — 88305 TISSUE EXAM BY PATHOLOGIST: CPT

## 2022-11-22 PROCEDURE — 2060000000 HC ICU INTERMEDIATE R&B

## 2022-11-22 PROCEDURE — 36415 COLL VENOUS BLD VENIPUNCTURE: CPT

## 2022-11-22 PROCEDURE — 87116 MYCOBACTERIA CULTURE: CPT

## 2022-11-22 PROCEDURE — 71045 X-RAY EXAM CHEST 1 VIEW: CPT

## 2022-11-22 PROCEDURE — 88184 FLOWCYTOMETRY/ TC 1 MARKER: CPT

## 2022-11-22 PROCEDURE — 97530 THERAPEUTIC ACTIVITIES: CPT

## 2022-11-22 PROCEDURE — 82945 GLUCOSE OTHER FLUID: CPT

## 2022-11-22 PROCEDURE — 83986 ASSAY PH BODY FLUID NOS: CPT

## 2022-11-22 PROCEDURE — A9579 GAD-BASE MR CONTRAST NOS,1ML: HCPCS | Performed by: SOCIAL WORKER

## 2022-11-22 PROCEDURE — 87205 SMEAR GRAM STAIN: CPT

## 2022-11-22 RX ORDER — LORAZEPAM 2 MG/ML
1 INJECTION INTRAMUSCULAR ONCE
Status: COMPLETED | OUTPATIENT
Start: 2022-11-22 | End: 2022-11-22

## 2022-11-22 RX ORDER — LACOSAMIDE 50 MG/1
100 TABLET ORAL 2 TIMES DAILY
Status: DISCONTINUED | OUTPATIENT
Start: 2022-11-22 | End: 2022-11-23 | Stop reason: HOSPADM

## 2022-11-22 RX ORDER — GUAIFENESIN 600 MG/1
600 TABLET, EXTENDED RELEASE ORAL 2 TIMES DAILY PRN
Status: DISCONTINUED | OUTPATIENT
Start: 2022-11-22 | End: 2022-11-23 | Stop reason: HOSPADM

## 2022-11-22 RX ADMIN — SODIUM CHLORIDE, PRESERVATIVE FREE 10 ML: 5 INJECTION INTRAVENOUS at 09:00

## 2022-11-22 RX ADMIN — POTASSIUM CHLORIDE 40 MEQ: 1500 TABLET, EXTENDED RELEASE ORAL at 05:53

## 2022-11-22 RX ADMIN — DULOXETINE HYDROCHLORIDE 60 MG: 60 CAPSULE, DELAYED RELEASE ORAL at 10:01

## 2022-11-22 RX ADMIN — LACOSAMIDE 100 MG: 50 TABLET, FILM COATED ORAL at 11:20

## 2022-11-22 RX ADMIN — ASPIRIN 81 MG: 81 TABLET, COATED ORAL at 11:20

## 2022-11-22 RX ADMIN — ENOXAPARIN SODIUM 30 MG: 100 INJECTION SUBCUTANEOUS at 10:01

## 2022-11-22 RX ADMIN — ACETAMINOPHEN 650 MG: 325 TABLET ORAL at 08:50

## 2022-11-22 RX ADMIN — OMEGA-3-ACID ETHYL ESTERS 1 G: 900 CAPSULE ORAL at 10:01

## 2022-11-22 RX ADMIN — POTASSIUM CHLORIDE 40 MEQ: 1500 TABLET, EXTENDED RELEASE ORAL at 10:01

## 2022-11-22 RX ADMIN — AMIODARONE HYDROCHLORIDE 200 MG: 200 TABLET ORAL at 10:01

## 2022-11-22 RX ADMIN — TAMSULOSIN HYDROCHLORIDE 0.4 MG: 0.4 CAPSULE ORAL at 10:01

## 2022-11-22 RX ADMIN — PANTOPRAZOLE SODIUM 40 MG: 40 TABLET, DELAYED RELEASE ORAL at 05:53

## 2022-11-22 RX ADMIN — GADOTERIDOL 20 ML: 279.3 INJECTION, SOLUTION INTRAVENOUS at 17:18

## 2022-11-22 RX ADMIN — GUAIFENESIN 600 MG: 600 TABLET, EXTENDED RELEASE ORAL at 10:11

## 2022-11-22 RX ADMIN — PRAVASTATIN SODIUM 80 MG: 80 TABLET ORAL at 19:41

## 2022-11-22 RX ADMIN — METOPROLOL TARTRATE 25 MG: 25 TABLET, FILM COATED ORAL at 10:01

## 2022-11-22 RX ADMIN — CLOPIDOGREL BISULFATE 75 MG: 75 TABLET ORAL at 11:20

## 2022-11-22 RX ADMIN — LORAZEPAM 1 MG: 2 INJECTION INTRAMUSCULAR; INTRAVENOUS at 16:35

## 2022-11-22 RX ADMIN — ENOXAPARIN SODIUM 30 MG: 100 INJECTION SUBCUTANEOUS at 19:39

## 2022-11-22 RX ADMIN — LACOSAMIDE 100 MG: 50 TABLET, FILM COATED ORAL at 19:39

## 2022-11-22 RX ADMIN — SODIUM CHLORIDE, PRESERVATIVE FREE 10 ML: 5 INJECTION INTRAVENOUS at 19:39

## 2022-11-22 RX ADMIN — Medication 1000 MCG: at 10:00

## 2022-11-22 RX ADMIN — OXYBUTYNIN CHLORIDE 5 MG: 5 TABLET ORAL at 19:41

## 2022-11-22 ASSESSMENT — ENCOUNTER SYMPTOMS
DIARRHEA: 0
VOMITING: 0
TROUBLE SWALLOWING: 0
COUGH: 0
PHOTOPHOBIA: 0
SHORTNESS OF BREATH: 0
NAUSEA: 0
COLOR CHANGE: 0

## 2022-11-22 ASSESSMENT — PAIN SCALES - GENERAL
PAINLEVEL_OUTOF10: 0
PAINLEVEL_OUTOF10: 0
PAINLEVEL_OUTOF10: 7
PAINLEVEL_OUTOF10: 5
PAINLEVEL_OUTOF10: 0
PAINLEVEL_OUTOF10: 0

## 2022-11-22 ASSESSMENT — PAIN DESCRIPTION - LOCATION
LOCATION: SHOULDER
LOCATION: HEAD

## 2022-11-22 ASSESSMENT — PAIN - FUNCTIONAL ASSESSMENT: PAIN_FUNCTIONAL_ASSESSMENT: ACTIVITIES ARE NOT PREVENTED

## 2022-11-22 ASSESSMENT — PAIN DESCRIPTION - FREQUENCY
FREQUENCY: INTERMITTENT
FREQUENCY: INTERMITTENT

## 2022-11-22 ASSESSMENT — PAIN DESCRIPTION - DESCRIPTORS
DESCRIPTORS: ACHING
DESCRIPTORS: ACHING;PRESSURE

## 2022-11-22 ASSESSMENT — PAIN DESCRIPTION - ONSET
ONSET: GRADUAL
ONSET: GRADUAL

## 2022-11-22 ASSESSMENT — PAIN SCALES - WONG BAKER: WONGBAKER_NUMERICALRESPONSE: 0

## 2022-11-22 ASSESSMENT — PAIN DESCRIPTION - ORIENTATION: ORIENTATION: RIGHT;LEFT;POSTERIOR

## 2022-11-22 ASSESSMENT — PAIN DESCRIPTION - PAIN TYPE: TYPE: CHRONIC PAIN

## 2022-11-22 NOTE — CARE COORDINATION
DISCHARGE/PLANNING EVALUATION  11/22/22, 9:08 AM EST    Reason for Referral: Patient is current with [facility]   Mental Status: Answered questions appropriately  Decision Making: Independent with help from wife Rick Rodriguez  Family/Social/Home Environment: Lives at home with Rick Rodriguez   Current Jesse Herrera, transportation and housekeeping: Current with [facility]  for RN services  Current Equipment:Rollator  Payment Source: [employer]   Concerns or Barriers to Discharge: None  Post-acute [facility]) provider list was provided to patient. Patient was informed of their freedom to choose [facility] provider. Discussed and offered to show the patient the relevant [facility] Providers quality and resource use measures on Medicare Compare web site via computer based on patient's goals of care and treatment preferences. Questions regarding selection process were answered. Teach Back Method used with Lesli Duff regarding care plan and discharge planning  Patient and wife Rick Rodriguez verbalize understanding of the plan of care and contribute to goal setting. Patient goals, treatment preferences and discharge plan: Return to home with wife and current [facility]. Called agency and made them aware that patient had been admitted.      Electronically signed by MANUEL Miramontes on 11/22/2022 at 9:08 AM

## 2022-11-22 NOTE — PROGRESS NOTES
[facility]  INPATIENT PHYSICAL THERAPY  EVALUATION  [facility] 4A - 4A-11/011-A    Time In: 0800  Time Out: 1409  Timed Code Treatment Minutes: 23 Minutes  Minutes: 33          Date: 2022  Patient Name: Angela Fregoso,  Gender:  male        MRN: [id number]  : 1945  (78 y.o.)      Referring Practitioner: Stephanie Montejo PA-C  Diagnosis: Seizure  Additional Pertinent Hx: PEr H&P : Warren Morgan is a 78 y.o  male with a PMHx of A. Fib, CAD, HTN who presents to [facility] ED today for the evaluation of frequent falls and loss of consciousness. Pt states since the day he was last discharged he has experienced significant shortness of breath with any activity, and frequent falls. He states just prior to falling he gets this \"lightness in my head\" then falls. Pt reports having 4-5 episodes of this. Wife at bedside reports most of these episodes have been witnessed and it appears he \"goes out\" and starts shaking involuntarily. Pt denies recollection of these events and is not responsive to his name. Pt presents with possible seizures. CTH- no ICH, midline shift, mass effect, MRI Brain ordered EEG-normal. Pt s/p left-sided thoracentesis . Restrictions/Precautions:  Restrictions/Precautions: Fall Risk, Seizure, Isolation  Position Activity Restriction  Other position/activity restrictions: droplet + isolation, s/p CABG     Subjective:  Chart Reviewed: Yes  Patient assessed for rehabilitation services?: Yes  Family / Caregiver Present: Yes (wife)  Subjective: RN approved session. Pt pleasantly agrees. Increased time for seated rest breaks between mobility tasks secondary to pt stating lightheadedness, also to take BP. Pt states a visual change upon final stance, improved with seated rest break. Encouraged the pt to take seated rests as needed with mobility and not to push through lightheaded feeling.  Pt required increased education on his sternal precautions. General:  Overall Orientation Status: Within Functional Limits  Vision: Within Functional Limits  Hearing: Exceptions to [facility]  Hearing Exceptions: Hard of hearing/hearing concerns       Pain: yes, requesting pain medication at end of essoin. States pain at left-sided thoracentesis site. Vitals: Blood Pressure:Sittin/72  Heart Rate: WFL in mid 80's  Pt BP assessed following standing with a seated rest to follow and c/o lightheadedness that subsided quickly. RN aware. Social/Functional History:    Lives With: Spouse  Type of Home: House  Home Layout: One level  Home Access: Level entry  Home Equipment: Apolonia Manifold, 4 wheeled, Kristina Samuel, Apolonia Manifold, [address] Help From: Family, [employer]        Ambulation Assistance: Independent  Transfer Assistance: Independent    Active : Yes     Additional Comments: pt previously used cane PTA. Pt has only been home for ~3 days between hospital admissions follow his CABG. Pt's wife is home and able to assist him.     OBJECTIVE:  Range of Motion:  Bilateral Lower Extremity: WFL    Strength:  Bilateral Lower Extremity: WFL  4+/5  Balance:  Static Sitting Balance:  Stand By Assistance  Dynamic Sitting Balance: Stand By Assistance  Static Standing Balance: Contact Guard Assistance    Bed Mobility:  Rolling to Left: Contact Guard Assistance   Supine to Sit: Minimal Assistance  Sit to Supine: Minimal Assistance   Cues for sternal precautions and min A provided to maintain these    Transfers:  Sit to Stand: [employer], with increased time for completion, cues for hand placement  Stand to Sit:Minimal Assistance, with increased time for completion, cues for hand placement  Cues for sternal precautions and to await until this PT ready to initiate mobility   Min A to support sitting as pt impulsive to initiate this     Ambulation:  Contact Guard Assistance, with verbal cues , with increased time for completion  Distance: 3', 4'  Surface: Level Tile  Device:Rolling Walker  Gait Deviations: Forward Flexed Posture, Slow Lin, Decreased Step Length Bilaterally, Decreased Heel Strike Bilaterally, Mild Path Deviations, and Unsteady Gait  CGA with cues provided for safety with mobility and to increase attention to activity tolerance. This PT prompted a seated rest as pt states lightheadedness and a visual change, improved with seated rest.     Exercise:none this session    Functional Outcome Measures: Completed  AM-PAC Inpatient Mobility without Stair Climbing Raw Score : 15  AM-PAC Inpatient without Stair Climbing T-Scale Score : 43.03    ASSESSMENT:  Activity Tolerance:  Patient tolerance of  treatment: fair. Pt with increased c/o lightheadedness, required several prolonged seated rests and education to not push through this and to allow to subside prior to initiating mobility. Increased time required to educate on sternal precautions. Treatment Initiated: Treatment and education initiated within context of evaluation. Evaluation time included review of current medical information, gathering information related to past medical, social and functional history, completion of standardized testing, formal and informal observation of tasks, assessment of data and development of plan of care and goals. Treatment time included skilled education and facilitation of tasks to increase safety and independence with functional mobility for improved independence and quality of life. Assessment: Body Structures, Functions, Activity Limitations Requiring Skilled Therapeutic Intervention: Decreased functional mobility , Decreased body mechanics, Decreased tolerance to work activity, Decreased safe awareness, Decreased balance  Assessment: This patient is a 78 y.o. who presents with seizures. This is a decline from the patient's baseline status of living at home with his wife with use of cane for mobility.  The patient is observed to have deficits in strength, balance, activity tolerance, and safety awareness and would benefit from skilled PT services to progress functional mobility, safety awareness, and to decrease overall risk of falls. Pt required use of AD, increased assist, and continued PT to progress his safe mobility. Therapy Prognosis: Good    Requires PT Follow-Up: Yes    Discharge Recommendations:  Discharge Recommendations: Continue to assess pending progress, Therapy recommended at discharge, 24 hour supervision or assist, Home with Home health PT    Patient Education:      . Patient Education  Education Given To: Patient  Education Provided: Role of Therapy, Plan of Care, Precautions, Transfer Training  Education Method: Demonstration, Verbal  Education Outcome: Continued education needed       Equipment Recommendations:  Equipment Needed: No    Plan:  Current Treatment Recommendations: Strengthening, Balance training, Functional mobility training, Transfer training, Endurance training, Neuromuscular re-education, Gait training, Stair training, Home exercise program, Safety education & training, Patient/Caregiver education & training, Therapeutic activities, Equipment evaluation, education, & procurement  General Plan:  (6x N)    Goals:  Patient Goals : none stated  Short Term Goals  Time Frame for Short Term Goals: by hospital d/c  Short Term Goal 1: Pt to complete supine <->sit indep for ease getting in and out of bed  Short Term Goal 2: Pt to complete sit <->Stand with RW, S and while maintainting sternal precautoins for improved safety in his environment  Short Term Goal 3: Pt to ambulate >=80' with RW and S to progress his safe mobility  Long Term Goals  Time Frame for Long Term Goals : NA due to short ELOS    Following session, patient left in safe position with all fall risk precautions in place.
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
## 2022-11-22 NOTE — PLAN OF CARE
Problem: Discharge Planning  Goal: Discharge to home or other facility with appropriate resources  Outcome: Progressing     Consult received. Please see SW note dated 11/22.

## 2022-11-22 NOTE — FLOWSHEET NOTE
11/22/22 0856   Vitals   Orthostatic B/P and Pulse?  Yes   Blood Pressure Lying 113/72   Pulse Lying 66 PER MINUTE   Blood Pressure Sitting 120/66   Pulse Sitting 71 PER MINUTE   Blood Pressure Standing 114/64   Pulse Standing 73 PER MINUTE

## 2022-11-22 NOTE — H&P
Patient's incision to abdomen with erythema and sutures still noted. Patient and family unable to tell this RN when sutures are supposed to be taken out. This RN called Dr. Shantelle Monahan who is covering for Dr. Alana Napoles and he stated someone will be up to assess.

## 2022-11-22 NOTE — PLAN OF CARE
Problem: Discharge Planning  Goal: Discharge to home or other facility with appropriate resources  11/22/2022 1720 by Hamzah Cerna  Outcome: Progressing     Problem: Pain  Goal: Verbalizes/displays adequate comfort level or baseline comfort level  Outcome: Progressing  Flowsheets (Taken 11/22/2022 0832)  Verbalizes/displays adequate comfort level or baseline comfort level:   Encourage patient to monitor pain and request assistance   Assess pain using appropriate pain scale   Administer analgesics based on type and severity of pain and evaluate response   Implement non-pharmacological measures as appropriate and evaluate response   Consider cultural and social influences on pain and pain management   Notify Licensed Independent Practitioner if interventions unsuccessful or patient reports new pain     Problem: Skin/Tissue Integrity  Goal: Absence of new skin breakdown  Description: 1. Monitor for areas of redness and/or skin breakdown  2. Assess vascular access sites hourly  3. Every 4-6 hours minimum:  Change oxygen saturation probe site  4. Every 4-6 hours:  If on nasal continuous positive airway pressure, respiratory therapy assess nares and determine need for appliance change or resting period. Outcome: Progressing     Problem: Safety - Adult  Goal: Free from fall injury  Outcome: Progressing     Problem: Safety - Adult  Goal: Isolation precautions  Description: Isolation precautions  Outcome: Progressing     Problem: ABCDS Injury Assessment  Goal: Absence of physical injury  Outcome: Progressing     Problem: Neurosensory - Adult  Goal: Achieves stable or improved neurological status  Outcome: Progressing  Flowsheets (Taken 11/22/2022 0832)  Achieves stable or improved neurological status:   Assess for and report changes in neurological status   Initiate measures to prevent increased intracranial pressure   Monitor temperature, glucose, and sodium.  Initiate appropriate interventions as ordered Maintain blood pressure and fluid volume within ordered parameters to optimize cerebral perfusion and minimize risk of hemorrhage     Problem: Neurosensory - Adult  Goal: Absence of seizures  Outcome: Progressing  Flowsheets (Taken 11/22/2022 0832)  Absence of seizures:   Monitor for seizure activity.   If seizure occurs, document type and location of movements and any associated apnea   If seizure occurs, turn head to side and suction secretions as needed   Administer anticonvulsants as ordered   Support airway/breathing, administer oxygen as needed   Diagnostic studies as ordered     Problem: Neurosensory - Adult  Goal: Remains free of injury related to seizures activity  Outcome: Progressing  Flowsheets (Taken 11/22/2022 8046)  Remains free of injury related to seizure activity:   Maintain airway, patient safety  and administer oxygen as ordered   Monitor patient for seizure activity, document and report duration and description of seizure to Licensed Independent Practitioner   If seizure occurs, turn patient to side and suction secretions as needed   Reorient patient post seizure   Seizure pads on all 4 side rails   Instruct patient/family to call for assistance with activity based on assessment   Instruct patient/family to notify RN of any seizure activity     Problem: Cardiovascular - Adult  Goal: Maintains optimal cardiac output and hemodynamic stability  Outcome: Progressing  Flowsheets (Taken 11/22/2022 0832)  Maintains optimal cardiac output and hemodynamic stability:   Monitor blood pressure and heart rate   Monitor urine output and notify Licensed Independent Practitioner for values outside of normal range   Assess for signs of decreased cardiac output     Problem: Cardiovascular - Adult  Goal: Absence of cardiac dysrhythmias or at baseline  Outcome: Progressing  Flowsheets (Taken 11/22/2022 0832)  Absence of cardiac dysrhythmias or at baseline:   Monitor cardiac rate and rhythm   Assess for signs of decreased cardiac output     Problem: Hematologic - Adult  Goal: Maintains hematologic stability  Outcome: Progressing  Flowsheets (Taken 11/22/2022 7432)  Maintains hematologic stability: Assess for signs and symptoms of bleeding or hemorrhage    Care plan reviewed with patient. Patient verbalized understanding of the plan of care and contributed to goal setting.

## 2022-11-22 NOTE — FLOWSHEET NOTE
11/22/22 1739   Safe Environment   Safety Measures   (vn rounds)   Call placed , pt responds to audio , permits video . Pt denied any voiced concerns or complaints at this time , call light within reach , no s/s distress noted .

## 2022-11-22 NOTE — PROGRESS NOTES
Hospitalist Progress Note      Patient:  Leonor Close    Unit/Bed:4A-11/011-A  YOB: 1945  MRN: [id number]   Acct: [de-identified]   PCP: Cecilia Gallardo MD  Date of Admission: 11/21/2022    Assessment/Plan:    #Seizure: Px 4-5 episodes of falls, LoC, generalized tonic-clonic movement, urinary incontinence since most recent discharge. CTH negative on arrival. Neurology consulted. Unable to tolerate MRI initially, completed 11/22/22. Initially started on Keppra, changed to Vimpat 100mg bid per neurology  -F/u MRI  -Seizure precautions  #L pleural effusion: Noted on CT chest on arrival. Thoracentesis ordered, will follow results. Currently stable on RA. #CAD: S/p CABG 11/8/22. Troponin mildly elevated on arrival. EKG: new TWI V2/V3, cardiology consulted, recommended medical therapy and continue monitoring. Continued on aspirin and plavix  #COVID19: Positive test 11/16/22. Afebrile, non-toxic appearing. Baseline RA. Continue IS, acapella. No indication for further therapy at this time  #HTN: Stable. Continued on home meds, monitor  #pAF: Noted. Continued on amiodarone, lopressor. Not on AC on arrival. [id number] 5. Continue telemetry  #Subclinical hypothyroidism: TSH 5.42, T4 1.40. Recommend re-testing in 4-6 weeks. #Chronic macrocytic anemia: Noted. B12/folate normal. No active bleeding noted. Will monitor. Chief Complaint: Frequent falls, LoC    Initial H and P:-    Elías Garvey is a 78 y.o  male with a PMHx of A. Fib, CAD, HTN who presents to [facility] ED today for the evaluation of frequent falls and loss of consciousness. Pt states since the day he was last discharged he has experienced significant shortness of breath with any activity, and frequent falls. He states just prior to falling he gets this \"lightness in my head\" then falls. Pt reports having 4-5 episodes of this.  Wife at bedside reports most of these episodes have been witnessed and it appears he \"goes out\" and starts shaking involuntarily. Pt denies recollection of these events and is not responsive to his name. He states he has had 2-3 episodes of urinary incontinence as a result of these episodes. He denies biting his tongue, bowel incontinence, abdominal pain, chest pain. Pt expresses that he is scared today. \" - Per HPI    Subjective (past 24 hours):   Patient seen and examined this AM. No overnight events. Endorses some surgical site pain, but otherwise feels fine. Endorses post-ictal state and urinary incontinence during seizure-like episodes at home. Neurology consulted, plan for MRI today. Past medical history, family history, social history and allergies reviewed again and is unchanged since admission. ROS (12 point review of systems completed. Pertinent positives noted.  Otherwise ROS is negative)     Medications:  Reviewed    Infusion Medications    sodium chloride      sodium chloride       Scheduled Medications    lacosamide  100 mg Oral BID    amiodarone  200 mg Oral Daily    aspirin EC  81 mg Oral Daily    clopidogrel  75 mg Oral Daily    vitamin B-12  1,000 mcg Oral Daily    DULoxetine  60 mg Oral Daily    omega-3 acid ethyl esters  1 g Oral Daily    [Held by provider] furosemide  20 mg Oral Daily    metoprolol tartrate  25 mg Oral BID    oxybutynin  5 mg Oral Nightly    pantoprazole  40 mg Oral QAM AC    pravastatin  80 mg Oral Nightly    tamsulosin  0.4 mg Oral Daily    sodium chloride flush  10 mL IntraVENous 2 times per day    enoxaparin  30 mg SubCUTAneous BID    sodium chloride flush  5-40 mL IntraVENous 2 times per day     PRN Meds: guaiFENesin, sodium chloride flush, sodium chloride, ondansetron **OR** ondansetron, polyethylene glycol, acetaminophen **OR** acetaminophen, potassium chloride **OR** potassium alternative oral replacement **OR** potassium chloride, magnesium sulfate, albuterol sulfate HFA **AND** ipratropium, sodium chloride flush, sodium chloride      Intake/Output Summary (Last 24 hours) at 11/22/2022 1723  Last data filed at 11/22/2022 0857  Gross per 24 hour   Intake 474.72 ml   Output --   Net 474.72 ml       Diet:  ADULT DIET; Regular; 5 carb choices (75 gm/meal); Low Sodium (2 gm); 2000 ml    Exam:    /63   Pulse 63   Temp 97.9 °F (36.6 °C) (Oral)   Resp 16   Ht 5' 6\" (1.676 m)   Wt 230 lb 2.6 oz (104.4 kg)   SpO2 98%   BMI 37.15 kg/m²     General appearance: Chronically ill-appearing male resting in bed in NAD  HEENT: Pupils equal, round, and reactive to light. Conjunctivae/corneas clear. Neck: Supple, with full range of motion. No jugular venous distention. Trachea midline. Respiratory:  Normal respiratory effort. Clear to auscultation, bilaterally without Rales/Wheezes/Rhonchi. Cardiovascular: Regular rate and rhythm with normal S1/S2 without murmurs, rubs or gallops. Abdomen: Soft, non-tender, non-distended with normal bowel sounds. Musculoskeletal: passive and active ROM x 4 extremities. Extremities: No peripheral edema noted  Skin: Skin color, texture, turgor normal.  No rashes or lesions. Neurologic:  Neurovascularly intact without any focal sensory/motor deficits.  Cranial nerves: II-XII intact, grossly non-focal.  Psychiatric: Alert and oriented, thought content appropriate, normal insight  Capillary Refill: Brisk,< 3 seconds   Peripheral Pulses: +2 palpable, equal bilaterally     Labs:   Recent Labs     11/21/22 0419 11/22/22 0332   WBC 10.1 8.3   HGB 9.5* 9.6*   HCT 29.8* 29.9*   * 450*     Recent Labs     11/21/22 0419 11/22/22  0332    140   K 4.0 3.4*    107   CO2 23 22*   BUN 15 14   CREATININE 1.2 1.1   CALCIUM 8.7 8.9     Recent Labs     11/21/22 0419 11/22/22  0332   AST 21 19   ALT 22 18   BILITOT 0.5 0.6   ALKPHOS 58 57     Recent Labs     11/21/22 0419 11/21/22  1450   INR 1.10 1.14*     Recent Labs     11/21/22  0419   CKTOTAL 139       Microbiology:    Blood culture #1:   Lab Results   Component Value Date/Time    BC No growth 24 hours. 11/21/2022 02:50 PM       Blood culture #2:No results found for: Wesely Saeid    Organism:No results found for: [facility]      Lab Results   Component Value Date/Time    [facility]  11/22/2022 10:53 AM     Rare segmented neutrophils observed. No epithelial cells observed. No bacteria seen. MRSA culture only:No results found for: [facility]    Urine culture: No results found for: LABURIN    Respiratory culture: No results found for: CULTRESP    Aerobic and Anaerobic :  No results found for: LABAERO  No results found for: LABANAE    Urinalysis:      Lab Results   Component Value Date/Time    NITRU NEGATIVE 11/11/2022 11:20 AM    WBCUA 5-9 11/11/2022 11:20 AM    BACTERIA FEW 11/11/2022 11:20 AM    RBCUA 15-20 11/11/2022 11:20 AM    BLOODU LARGE 11/11/2022 11:20 AM    SPECGRAV >1.030 11/11/2022 11:20 AM       Radiology:  US THORACENTESIS Which side should the procedure be performed? Left   Final Result      Successful ultrasound-guided thoracentesis with  1.2 L of fluid drained. **This report has been created using voice recognition software. It may contain minor errors which are inherent in voice recognition technology. **          Final report electronically signed by Dr Flavia Mckeon on 11/22/2022 12:21 PM      XR CHEST PA INSPIRATION 1 VW   Final Result   No pneumothorax following a left-sided thoracentesis. **This report has been created using voice recognition software. It may contain minor errors which are inherent in voice recognition technology. **      Final report electronically signed by Dr Flavia Mckeon on 11/22/2022 12:15 PM      CT HEAD WO CONTRAST   Final Result   1. No acute intracranial process. 2. Minimal changes of chronic microvascular ischemic disease and    age-related global volume loss.       This document has been electronically signed by: Debra Saleh DO on    11/21/2022 05:58 AM      All CTs at this facility use dose modulation techniques and iterative    reconstructions, and/or weight-based dosing   when appropriate to reduce radiation to a low as reasonably achievable. CT CERVICAL SPINE WO CONTRAST   Final Result   1. No acute fracture involving the cervical spine. 2. ACDF of C5-7, the surgical hardware is intact. 3. Spondylitic changes of the cervical spine including facet/uncovertebral    joint arthropathy. 4. Partially visualized left pleural effusion extending to the left lung    apex. This document has been electronically signed by: Zeeshan Mcdaniels DO on    11/21/2022 06:05 AM      All CTs at this facility use dose modulation techniques and iterative    reconstructions, and/or weight-based dosing   when appropriate to reduce radiation to a low as reasonably achievable. CT CHEST W CONTRAST   Final Result   1. Small right and moderate left pleural effusion. The left pleural    effusion extends to the left lung apex. Passive atelectasis within the    left lung base. Mild interstitial densities of the left lung base likely    representing pulmonary edema. Recommend continued follow-up to clearing of    these findings. 2. Within the superior left mediastinum and adjacent to the coronary    artery bypass grafting there is a well geographically defined    heterogeneous fluid collection. The average Hounsfield units centrally    within this collection measures proximally 35 consistent with complex    fluid. Finding may be related to coronary bypass grafting. Recommend    correlating with any prior CT imaging of the thorax. If none exists,    recommend follow-up examination in 3 months to document stability of this    finding. The fluid collection measures 4.2 x 3.5 x 3.8 cm and is best    identified on axial image 21 of series 2. No suspicious peripheral    nodularity or internal septations. 3. Moderate/severe coronary atherosclerosis. Small pericardial effusion    measuring up to 11 mm ventrally. This document has been electronically signed by: Sharla Sue DO on    11/21/2022 06:15 AM      All CTs at this facility use dose modulation techniques and iterative    reconstructions, and/or weight-based dosing   when appropriate to reduce radiation to a low as reasonably achievable. MRI BRAIN W WO CONTRAST    (Results Pending)     CT HEAD WO CONTRAST    Result Date: 11/21/2022  CT head without contrast Comparison: None Findings: No intracranial mass, midline shift, hydrocephalus, or acute hemorrhage. Minimal changes of chronic microvascular ischemic disease and age-related global volume loss. Minimal mucosal thickening of the bilateral maxillary sinuses and ethmoid sinuses. The orbits are within normal limits. No acute fracture. 1. No acute intracranial process. 2. Minimal changes of chronic microvascular ischemic disease and age-related global volume loss. This document has been electronically signed by: Sharla Sue DO on 11/21/2022 05:58 AM All CTs at this facility use dose modulation techniques and iterative reconstructions, and/or weight-based dosing when appropriate to reduce radiation to a low as reasonably achievable. CT CHEST W CONTRAST    Result Date: 11/21/2022  CT chest with contrast. Comparison: Chest x-ray November 18, 2022 Findings: The thyroid is unremarkable. The thoracic aorta is normal caliber. The great vessels are patent. Atherosclerosis of thoracic aorta. Within the superior left mediastinum and adjacent to the changes of coronary artery bypass grafting there is a well geographically defined heterogeneous fluid collection. The average Hounsfield units centrally within this collection measures proximally 35 consistent with complex fluid. Finding may be related to coronary bypass grafting. Recommend correlating with any prior CT imaging of the thorax. If none exists, recommend follow-up examination in 3 months to document stability of this finding.  The fluid collection measures 4.2 x 3.5 x 3.8 cm and is best identified on axial image 21 of series 2. No suspicious peripheral nodularity or internal septations. Calcified granulomas of the right hilar region. The heart is normal size. Changes of coronary artery bypass grafting. Moderate to severe coronary atherosclerosis. Small pericardial effusion measuring up to 11 mm ventrally. Small right and moderate left pleural effusion. The left pleural effusion extends to the left lung apex. Passive atelectasis within the left lung base. Mild interstitial densities of the left lung base likely representing pulmonary edema. The stomach is nondistended. The bones are intact. Median sternotomy wires. Spondylosis of the thoracic spine. Partially visualized ACDF of the cervical spine. 1. Small right and moderate left pleural effusion. The left pleural effusion extends to the left lung apex. Passive atelectasis within the left lung base. Mild interstitial densities of the left lung base likely representing pulmonary edema. Recommend continued follow-up to clearing of these findings. 2. Within the superior left mediastinum and adjacent to the coronary artery bypass grafting there is a well geographically defined heterogeneous fluid collection. The average Hounsfield units centrally within this collection measures proximally 35 consistent with complex fluid. Finding may be related to coronary bypass grafting. Recommend correlating with any prior CT imaging of the thorax. If none exists, recommend follow-up examination in 3 months to document stability of this finding. The fluid collection measures 4.2 x 3.5 x 3.8 cm and is best identified on axial image 21 of series 2. No suspicious peripheral nodularity or internal septations. 3. Moderate/severe coronary atherosclerosis. Small pericardial effusion measuring up to 11 mm ventrally.  This document has been electronically signed by: Shmuel Houston DO on 11/21/2022 06:15 AM All CTs at this facility use dose modulation techniques and iterative reconstructions, and/or weight-based dosing when appropriate to reduce radiation to a low as reasonably achievable. CT CERVICAL SPINE WO CONTRAST    Result Date: 11/21/2022  CT cervical spine without contrast Comparison: None Findings: ACDF of C5-7, the surgical hardware is intact. No acute fractures or dislocations. Spondylitic changes of the cervical spine including facet/uncovertebral joint arthropathy. Visualized intracranial contents are unremarkable. Soft tissues of the neck are normal. Partially visualized left pleural effusion extending to the left lung apex. 1. No acute fracture involving the cervical spine. 2. ACDF of C5-7, the surgical hardware is intact. 3. Spondylitic changes of the cervical spine including facet/uncovertebral joint arthropathy. 4. Partially visualized left pleural effusion extending to the left lung apex. This document has been electronically signed by: Berny Arevalo DO on 11/21/2022 06:05 AM All CTs at this facility use dose modulation techniques and iterative reconstructions, and/or weight-based dosing when appropriate to reduce radiation to a low as reasonably achievable. US THORACENTESIS Which side should the procedure be performed? Left    Result Date: 11/22/2022  PROCEDURE: US THORACENTESIS CLINICAL INFORMATION: 51-year-old male who recently underwent open heart surgery. The patient currently has Covid 19. COMPARISON: No prior study. PROCEDURE: Physician performing procedure: Dr. Tung Freeman Informed consent signed: yes Local Anesthetic: 1% lidocaine Specimen volume: 73 ml Catheter: 5 Arabic Aspirated pleural fluid volume: 1.173 liters Aspirated pleural fluid color: red Complications: none The benefits and the risk of the procedure were explained to the patient. The patient was given an opportunity to ask questions. Signed consent was obtained. Limited ultrasound exam of the left chest showed  large  amount of pleural fluid.   Using ultrasound guidance, a site was marked on the skin. The left side of the posterior chest was prepped and draped using the usual sterile technique and the skin was anesthetized with 2 percent lidocaine. A 5 [place]orran one-step catheter was introduced to the left pleural space. 1.2 L of red-colored fluid drained. The catheter was then removed. The patient tolerated the procedure well with no complications. Specimen sent for laboratory studies as requested. Successful ultrasound-guided thoracentesis with  1.2 L of fluid drained. **This report has been created using voice recognition software. It may contain minor errors which are inherent in voice recognition technology. **  Final report electronically signed by Dr Madiha Siegel on 11/22/2022 12:21 PM    XR CHEST PA INSPIRATION 1 VW    Result Date: 11/22/2022  PROCEDURE: XR CHEST PA INSPIRATION 1 VW CLINICAL INFORMATION: 61-year-old male who underwent a left-sided thoracentesis. COMPARISON: Radiographs 11/18/2022. TECHNIQUE: AP upright view of the chest was obtained. FINDINGS: Metallic wire sutures are in the sternum. Surgical clips are in the mediastinum. There is fusion hardware in the cervical spine. The lungs are clear. There is resolution of left pleural effusion. There is no pneumothorax. There is cardiomegaly. The pulmonary vasculature is within normal limits. Visualized portions of the upper abdomen are within normal limits. The osseous structures are intact. No acute fractures or suspicious osseous lesions. No pneumothorax following a left-sided thoracentesis. **This report has been created using voice recognition software. It may contain minor errors which are inherent in voice recognition technology. ** Final report electronically signed by Dr Madiha Siegel on 11/22/2022 12:15 PM      Electronically signed by Shi Boss MD, PGY-2 on 11/22/2022 at 5:23 PM

## 2022-11-22 NOTE — CARE COORDINATION
11/22/22, 2:00 PM EST    DISCHARGE ON GOING EVALUATION    [facility] day: 1  Location: -11/011-A Reason for admit: Seizure [facility]) [R56.9]  Syncope, unspecified syncope type [R55]   Procedure:   11/21 CT Head WO Contrast 1. No acute intracranial process. 2. Minimal changes of chronic microvascular ischemic disease and   age-related global volume loss. 11/21 CT Cervical spine WO Contrast 1. No acute fracture involving the cervical spine. 2. ACDF of C5-7, the surgical hardware is intact. 3. Spondylitic changes of the cervical spine including facet/uncovertebral   joint arthropathy. 4. Partially visualized left pleural effusion extending to the left lung   apex. 11/21 CT Chest W Contrast 1. Small right and moderate left pleural effusion. The left pleural   effusion extends to the left lung apex. Passive atelectasis within the   left lung base. Mild interstitial densities of the left lung base likely   representing pulmonary edema. Recommend continued follow-up to clearing of   these findings. 2. Within the superior left mediastinum and adjacent to the coronary   artery bypass grafting there is a well geographically defined   heterogeneous fluid collection. The average Hounsfield units centrally   within this collection measures proximally 35 consistent with complex   fluid. Finding may be related to coronary bypass grafting. Recommend   correlating with any prior CT imaging of the thorax. If none exists,   recommend follow-up examination in 3 months to document stability of this   finding. The fluid collection measures 4.2 x 3.5 x 3.8 cm and is best   identified on axial image 21 of series 2. No suspicious peripheral   nodularity or internal septations. 3. Moderate/severe coronary atherosclerosis. Small pericardial effusion   measuring up to 11 mm ventrally. 11/22 Thoracentesis Successful ultrasound-guided thoracentesis with  1.2 L of fluid drained.    11/22 CXR No pneumothorax following a left-sided thoracentesis. Barriers to Discharge: Neurology, Cardiology following, diabetes management, Acapella, PT/OT, Asa, Plavix, Lovenox, Vimpat, Protonix, electrolyte replacement protocols. Cardiology following. PCP: Al Benito MD  Readmission Risk Score: 21.9%  Patient Goals/Plan/Treatment Preferences: Plans returning home with spouse, resuming HH.

## 2022-11-22 NOTE — PROGRESS NOTES
Patient's incision to abdomen with erythema and sutures still noted. Patient and family unable to tell this RN when sutures are supposed to be taken out. This RN called Dr. Michael Cerda who is covering for Dr. Eileen Babcock and he stated someone will be up to assess.

## 2022-11-22 NOTE — PROGRESS NOTES
Occupational Therapy  [address]  OCCUPATIONAL THERAPY MISSED TREATMENT NOTE  [facility] 4A  4A-11011-A      Date: 2022  Patient Name: Letcher Schirmer        CSN: [id number]   : 1945  (78 y.o.)  Gender: male   Referring Practitioner: Jen Quarles PA-C  Diagnosis: seizure         REASON FOR MISSED TREATMENT: Patient Unavailable    Pt off the floor for a thoracentesis on first attempt in AM. Pt with physical therapy in PM on second attempt. Will check back .

## 2022-11-23 VITALS
RESPIRATION RATE: 18 BRPM | BODY MASS INDEX: 37.17 KG/M2 | OXYGEN SATURATION: 100 % | HEIGHT: 66 IN | DIASTOLIC BLOOD PRESSURE: 71 MMHG | HEART RATE: 69 BPM | TEMPERATURE: 97.9 F | WEIGHT: 231.26 LBS | SYSTOLIC BLOOD PRESSURE: 131 MMHG

## 2022-11-23 LAB
ALBUMIN SERPL-MCNC: 3.4 G/DL (ref 3.5–5.1)
ALP BLD-CCNC: 55 U/L (ref 38–126)
ALT SERPL-CCNC: 16 U/L (ref 11–66)
ANION GAP SERPL CALCULATED.3IONS-SCNC: 12 MEQ/L (ref 8–16)
AST SERPL-CCNC: 14 U/L (ref 5–40)
BASOPHILS # BLD: 0.5 %
BASOPHILS ABSOLUTE: 0 THOU/MM3 (ref 0–0.1)
BILIRUB SERPL-MCNC: 0.6 MG/DL (ref 0.3–1.2)
BUN BLDV-MCNC: 17 MG/DL (ref 7–22)
CALCIUM IONIZED: 1.2 MMOL/L (ref 1.12–1.32)
CALCIUM SERPL-MCNC: 8.4 MG/DL (ref 8.5–10.5)
CHLORIDE BLD-SCNC: 106 MEQ/L (ref 98–111)
CO2: 22 MEQ/L (ref 23–33)
CREAT SERPL-MCNC: 1.1 MG/DL (ref 0.4–1.2)
EOSINOPHIL # BLD: 2.5 %
EOSINOPHILS ABSOLUTE: 0.2 THOU/MM3 (ref 0–0.4)
ERYTHROCYTE [DISTWIDTH] IN BLOOD BY AUTOMATED COUNT: 15.4 % (ref 11.5–14.5)
ERYTHROCYTE [DISTWIDTH] IN BLOOD BY AUTOMATED COUNT: 53.9 FL (ref 35–45)
GFR SERPL CREATININE-BSD FRML MDRD: > 60 ML/MIN/1.73M2
GLUCOSE BLD-MCNC: 101 MG/DL (ref 70–108)
GLUCOSE BLD-MCNC: 147 MG/DL (ref 70–108)
GLUCOSE BLD-MCNC: 153 MG/DL (ref 70–108)
GLUCOSE BLD-MCNC: 95 MG/DL (ref 70–108)
HCT VFR BLD CALC: 29.7 % (ref 42–52)
HEMOGLOBIN: 9.5 GM/DL (ref 14–18)
IMMATURE GRANS (ABS): 0.06 THOU/MM3 (ref 0–0.07)
IMMATURE GRANULOCYTES: 0.8 %
LYMPHOCYTES # BLD: 18.6 %
LYMPHOCYTES ABSOLUTE: 1.5 THOU/MM3 (ref 1–4.8)
MCH RBC QN AUTO: 31.5 PG (ref 26–33)
MCHC RBC AUTO-ENTMCNC: 32 GM/DL (ref 32.2–35.5)
MCV RBC AUTO: 98.3 FL (ref 80–94)
MONOCYTES # BLD: 8.3 %
MONOCYTES ABSOLUTE: 0.7 THOU/MM3 (ref 0.4–1.3)
NUCLEATED RED BLOOD CELLS: 0 /100 WBC
PLATELET # BLD: 441 THOU/MM3 (ref 130–400)
PMV BLD AUTO: 9.7 FL (ref 9.4–12.4)
POTASSIUM REFLEX MAGNESIUM: 4.3 MEQ/L (ref 3.5–5.2)
RBC # BLD: 3.02 MILL/MM3 (ref 4.7–6.1)
SEG NEUTROPHILS: 69.3 %
SEGMENTED NEUTROPHILS ABSOLUTE COUNT: 5.5 THOU/MM3 (ref 1.8–7.7)
SODIUM BLD-SCNC: 140 MEQ/L (ref 135–145)
TOTAL PROTEIN: 5.3 G/DL (ref 6.1–8)
WBC # BLD: 8 THOU/MM3 (ref 4.8–10.8)

## 2022-11-23 PROCEDURE — 97530 THERAPEUTIC ACTIVITIES: CPT

## 2022-11-23 PROCEDURE — 6370000000 HC RX 637 (ALT 250 FOR IP): Performed by: SOCIAL WORKER

## 2022-11-23 PROCEDURE — 97165 OT EVAL LOW COMPLEX 30 MIN: CPT

## 2022-11-23 PROCEDURE — 97110 THERAPEUTIC EXERCISES: CPT

## 2022-11-23 PROCEDURE — 82948 REAGENT STRIP/BLOOD GLUCOSE: CPT

## 2022-11-23 PROCEDURE — 99239 HOSP IP/OBS DSCHRG MGMT >30: CPT | Performed by: HOSPITALIST

## 2022-11-23 PROCEDURE — 6370000000 HC RX 637 (ALT 250 FOR IP)

## 2022-11-23 PROCEDURE — 82330 ASSAY OF CALCIUM: CPT

## 2022-11-23 PROCEDURE — 36415 COLL VENOUS BLD VENIPUNCTURE: CPT

## 2022-11-23 PROCEDURE — 97116 GAIT TRAINING THERAPY: CPT

## 2022-11-23 PROCEDURE — 80053 COMPREHEN METABOLIC PANEL: CPT

## 2022-11-23 PROCEDURE — 6360000002 HC RX W HCPCS

## 2022-11-23 PROCEDURE — 85025 COMPLETE CBC W/AUTO DIFF WBC: CPT

## 2022-11-23 RX ORDER — LACOSAMIDE 100 MG/1
100 TABLET ORAL 2 TIMES DAILY
Qty: 60 TABLET | Refills: 1 | Status: SHIPPED | OUTPATIENT
Start: 2022-11-23 | End: 2022-11-23 | Stop reason: SDUPTHER

## 2022-11-23 RX ORDER — LACOSAMIDE 100 MG/1
100 TABLET ORAL 2 TIMES DAILY
Qty: 60 TABLET | Refills: 1 | Status: SHIPPED | OUTPATIENT
Start: 2022-11-23 | End: 2022-12-07 | Stop reason: SDUPTHER

## 2022-11-23 RX ORDER — VITAMIN B COMPLEX
1000 TABLET ORAL DAILY
Status: DISCONTINUED | OUTPATIENT
Start: 2022-11-23 | End: 2022-11-23 | Stop reason: HOSPADM

## 2022-11-23 RX ORDER — CHOLECALCIFEROL (VITAMIN D3) 25 MCG
1000 TABLET ORAL DAILY
Qty: 30 TABLET | Refills: 0 | Status: SHIPPED | OUTPATIENT
Start: 2022-11-23 | End: 2023-04-24

## 2022-11-23 RX ADMIN — DULOXETINE HYDROCHLORIDE 60 MG: 60 CAPSULE, DELAYED RELEASE ORAL at 09:25

## 2022-11-23 RX ADMIN — OMEGA-3-ACID ETHYL ESTERS 1 G: 900 CAPSULE ORAL at 09:36

## 2022-11-23 RX ADMIN — Medication 1000 UNITS: at 13:49

## 2022-11-23 RX ADMIN — GUAIFENESIN 600 MG: 600 TABLET, EXTENDED RELEASE ORAL at 09:24

## 2022-11-23 RX ADMIN — AMIODARONE HYDROCHLORIDE 200 MG: 200 TABLET ORAL at 09:25

## 2022-11-23 RX ADMIN — ENOXAPARIN SODIUM 30 MG: 100 INJECTION SUBCUTANEOUS at 09:24

## 2022-11-23 RX ADMIN — TAMSULOSIN HYDROCHLORIDE 0.4 MG: 0.4 CAPSULE ORAL at 09:24

## 2022-11-23 RX ADMIN — PANTOPRAZOLE SODIUM 40 MG: 40 TABLET, DELAYED RELEASE ORAL at 06:22

## 2022-11-23 RX ADMIN — CLOPIDOGREL BISULFATE 75 MG: 75 TABLET ORAL at 09:25

## 2022-11-23 RX ADMIN — METOPROLOL TARTRATE 25 MG: 25 TABLET, FILM COATED ORAL at 09:24

## 2022-11-23 RX ADMIN — Medication 1000 MCG: at 09:24

## 2022-11-23 RX ADMIN — ASPIRIN 81 MG: 81 TABLET, COATED ORAL at 09:24

## 2022-11-23 RX ADMIN — LACOSAMIDE 100 MG: 50 TABLET, FILM COATED ORAL at 09:24

## 2022-11-23 ASSESSMENT — ENCOUNTER SYMPTOMS
NAUSEA: 0
PHOTOPHOBIA: 0
VOMITING: 0
TROUBLE SWALLOWING: 0
SHORTNESS OF BREATH: 0
DIARRHEA: 0
COUGH: 0
COLOR CHANGE: 0

## 2022-11-23 ASSESSMENT — PAIN SCALES - WONG BAKER
WONGBAKER_NUMERICALRESPONSE: 0
WONGBAKER_NUMERICALRESPONSE: 0

## 2022-11-23 ASSESSMENT — PAIN SCALES - GENERAL
PAINLEVEL_OUTOF10: 0

## 2022-11-23 NOTE — PROGRESS NOTES
[facility]UN[facility] Center[facility]osef[facility]  INPATIENT OCCUPATIONAL THERAPY  [facility] NEUROSCIENCES 4A  EVALUATION    Time:   Time In:   Time Out: 1545  Timed Code Treatment Minutes: 15 Minutes  Minutes: 30          Date: 2022  Patient Name: Zulma Contreras,   Gender: male      MRN: [id number]  : 1945  (78 y.o.)  Referring Practitioner: Kym Boas, PA-C  Diagnosis: Seizure  Additional Pertinent Hx: HPI: This is a pleasant 78 y.o. male presents with seizure activity and falls. Her PMH is significant for CAD s/p CABG x2 11/15/22, paroxysmal atrial fibrillation, previous syncopal episodes in , HLD, HTN, bradycardia. Cardiology was consulted for elevated troponin, new anterior T wave inversion, and worsening LOAIZA. The patient states that he had a cath on 22 to clean out a re-stenosed artery, during which part of the wire broke off within the patient's artery. He required open heart surgery with 2 CABGs on 22, and stayed in the hospital for days before going home. He was back in the ED on 22 for SOB described as \"suffocating\" with any activity and was found to be positive for COVID, after which he was sent home. The patient stated that ever since he had tested positive for COVID, he has been having seizures (1 on Saturday, 3 on ). Per the patient's wife, he stiffens his legs, thrashes his arms, has involuntary shaking, and collapses on the ground after crushing head pain. He is often groggy and confused for several minutes after each episode. He has never had symptoms like this before. DX: COVID infection 22, seizure. Pt had L thorocentesis on 22.     Restrictions/Precautions:  Restrictions/Precautions: Fall Risk, Seizure, Isolation  Position Activity Restriction  Other position/activity restrictions: droplet + isolation, s/p CABG     Subjective  Chart Reviewed: Yes, Orders, History and Physical, Other (comment) (PT evaluation)  Patient assessed for rehabilitation services?: Yes    Subjective: Pleasant and cooperative  Comments: RN approved session. Pt did not C/O pain. Pain: Denies pain at this time. Vitals: Nurse checked vitals prior to session    Social/Functional History:  Lives With: Spouse  Type of Home: House  Home Layout: One level  Home Access: Level entry  Home Equipment: Lavell Santacruz, 4 wheeled, [address], [address], Walker, rolling   Bathroom Shower/Tub: Walk-in shower  Bathroom Toilet: Standard  Bathroom Equipment: Toilet raiser  Bathroom Accessibility: Accessible    Receives Help From: Family, Neighbor  ADL Assistance: Needs assistance  Homemaking Assistance: Needs assistance  Homemaking Responsibilities: No  Ambulation Assistance: Independent  Transfer Assistance: Independent    Active : Yes  Occupation: Retired  Type of Occupation:   Leisure & Hobbies: Taking care of 2 dogs; traveling with RV to [employer] in the winter; reading  Additional Comments: pt previously used cane PTA. Pt has only been home for ~3 days between hospital admissions follow his CABG. Pt's wife is home and able to assist him. Pt has not been able to tolerate wearing compression stockings. Pt has poor sleep - pattern varies. VISION:WFL    HEARING:  WFL    COGNITION: Decreased Safety Awareness    RANGE OF MOTION:  Bilateral Upper Extremity:  WFL    STRENGTH:  Bilateral Upper Extremity:  Not Tested    SENSATION:   WFL    ADL:   Lower Extremity Dressing: Supervision. Donning slipper socks while hiking each leg up to the bed . BALANCE:  Sitting Balance:  Supervision. Donning slipper socks  Standing Balance: Stand By Assistance. Preparing to walk    BED MOBILITY:  Supine to Sit: Stand By Assistance, with head of bed raised    Sit to Supine: Stand By Assistance, with head of bed raised      TRANSFERS:  Sit to Stand:  Stand By Assistance. From the edge of bed  Stand to Sit: Stand By Assistance.  To the edge of bed    FUNCTIONAL MOBILITY:  Assistive Device: Rolling Walker  Assist Level:  Stand By Assistance. Distance:  30 ft in his room  Pt shows even steps; slow pace. No LOB noted. Activity Tolerance:  Patient tolerance of  treatment: fair. Pt stood for 30 seconds while preparing to walk. He returned to the bed and sat down when he started to get tired. Assessment:  Assessment: Pt presents with seizures. He has hx of CABG in 11/8/22. He was discharged and readmitted with new onset seizures. He had tested positive for COVID. Pt had L thoracentesis  on 11/22/22. Pt was having to use a rolling walker for a short time prior to his CABG. He was independent with self care. Pt demonstrated donning slipper socks while bringing each of his legs up to the edge of bed with SBA. Pt had mild SOB after walking in his room. Pt completed transfer with SBA from the edge of bed. He ambulated with SBA using the rolling walker. Pt tolerated the walk over 30 ft fair. Performance deficits / Impairments: Decreased high-level IADLs  REQUIRES OT FOLLOW-UP: No  Decision Making: Low Complexity    Treatment Initiated: Treatment and education initiated within context of evaluation. Evaluation time included review of current medical information, gathering information related to past medical, social and functional history, completion of standardized testing, formal and informal observation of tasks, assessment of data and development of plan of care and goals. Treatment time included skilled education and facilitation of tasks to increase safety and independence with ADL's for improved functional independence and quality of life. Discussed energy conservation techniques and precautions after having CABG as well as seizures. Pt described enjoying activities especially the work he was doing before he retired. Pt was encouraged to pick wisely the activities he does now that he is retired. Pt enjoys taking on projects but does understand that his body has limitations.     Pt stated that he enjoys taking care of his dogs. He has 2 small dogs which can jump up to him and he does not need to reach down and . Pt also described going to the [employer] and staying at an RV camp. Pt and spouse agreed to break up the trip down there in January in small parts so that the spouse can tolerate doing the driving and pt can have opportunities to get up and walk. Pt stated that he sleeps limited amounts at any one time. He has a CPAP machine on back order, he stated. Encouraged to practice good \"sleep hygiene\" meaning: going to bed at a consistent time and following the same ritual each time prior to going to bed. Encouraged pt to also use the Admaxim reader on dark mode so that his eyes can more easily get used to dimmer light at night and he can fall asleep more easily. Pt verbalized understanding. Discharge Recommendations:  Home with assist PRN    Patient Education:     Patient Education  Education Given To: Patient, Family  Education Provided: Role of Therapy, Energy Conservation, Precautions  Education Provided Comments: Importance of picking wisely the activities which he attempts to do while recovering from the heart sx and lung procedure. Education Method: Printed Information/Hand-outs, Verbal  Barriers to Learning: None  Education Outcome: Verbalized understanding    Equipment Recommendations:  Equipment Needed: Yes  Other: Expressing interest in having a riser for his toilet which is circular in shape    Plan:  Times Per Week: Not applicable  Additional Comments: Pt would be able to return home with spouse when medically stable and discharged from Acute. No follow up OT recommended. Specific Instructions for Next Treatment: Not applicable. See long-term goal time frame for expected duration of plan of care. If no long-term goals established, a short length of stay is anticipated. Goals:  Patient goals : \"Get back home and return to doing simple activities. \" pt states. Short Term Goals  Short Term Goal 1: Pt will describe 2 ways in which he can manage fatigue and enjoy activities safely such as caring for his dogs or going on a road trip in January. Goal met. Additional Goals?: No         Following session, patient left in safe position with all fall risk precautions in place.

## 2022-11-23 NOTE — PROGRESS NOTES
[address] [place] [address]  INPATIENT PHYSICAL THERAPY  DAILY NOTE  STR NEUROSCIENCES 4A - 4A-11/011-A    Time In: 2674  Time Out: 2135  Timed Code Treatment Minutes: 39 Minutes  Minutes: 45          Date: 2022  Patient Name: Enrico Britt,  Gender:  male        MRN: [id number]  : 1945  (78 y.o.)     Referring Practitioner: Praneeth Schulz PA-C  Diagnosis: Seizure  Additional Pertinent Hx: PEr H&P : Carol Guerrero is a 78 y.o  male with a PMHx of A. Fib, CAD, HTN who presents to [facility] ED today for the evaluation of frequent falls and loss of consciousness. Pt states since the day he was last discharged he has experienced significant shortness of breath with any activity, and frequent falls. He states just prior to falling he gets this \"lightness in my head\" then falls. Pt reports having 4-5 episodes of this. Wife at bedside reports most of these episodes have been witnessed and it appears he \"goes out\" and starts shaking involuntarily. Pt denies recollection of these events and is not responsive to his name. Pt presents with possible seizures. CTH- no ICH, midline shift, mass effect, MRI Brain ordered EEG-normal. Pt s/p left-sided thoracentesis . Prior Level of Function:  Lives With: Spouse  Type of Home: House  Home Layout: One level  Home Access: Level entry  Home Equipment: Eletha Bouquet, 4 wheeled, [employer], Eletha Bouquet, rolling        [employer] Help From: Family, [employer]  Ambulation Assistance: Independent  Transfer Assistance: Independent  Active : Yes  Additional Comments: pt previously used cane PTA. Pt has only been home for ~3 days between hospital admissions follow his CABG. Pt's wife is home and able to assist him. Restrictions/Precautions:  Restrictions/Precautions: Fall Risk, Seizure, Isolation  Position Activity Restriction  Other position/activity restrictions: droplet + isolation, s/p CABG      SUBJECTIVE: RN approved session.  Patient in bed finishing breakfast upon arrival and agreeable to therapy. Wife in room to observe session. Pt needed increased rest breaks due to fatigue. PAIN: Not Rated    Vitals: Vitals not assessed per clinical judgement, see nursing flowsheet    OBJECTIVE:  Bed Mobility:  Supine to Sit: Stand By Assistance  Scooting: Stand By Assistance    Transfers:  Sit to Stand: [address], with increased time for completion, x4 trials; x1 EOB, x2 from chair, x 1 from toilet  Stand to Sit:Stand By Assistance    Ambulation:  Contact Guard Assistance  Distance: 7 ft, 2 x 10 ft  Surface: Level Tile  Device:Rolling Walker  Gait Deviations: Forward Flexed Posture, Slow Lin, Decreased Step Length Bilaterally, and Decreased Gait Speed    Balance:  Dynamic Sitting Balance: Stand By Assistance, While performing seated exercises without back support    Exercise:  Patient was guided in 1 set(s) 10 reps of exercise to both lower extremities. Upper trunk rotations, Shoulder horizontal abduction/adduction, Shoulder rolls, Seated marches, and Long arc quads. Exercises were completed for increased independence with functional mobility. Functional Outcome Measures: Completed  AM-PAC Inpatient Mobility without Stair Climbing Raw Score : 15  AM-PAC Inpatient without Stair Climbing T-Scale Score : 43.03    ASSESSMENT:  Assessment: Patient progressing toward established goals. Activity Tolerance:  Patient tolerance of  treatment: good.       Equipment Recommendations:Equipment Needed: No  Discharge Recommendations: Home with Assist as Needed, Home with Home Health PT, and Patient would benefit from continued PT at discharge  Plan: Current Treatment Recommendations: Strengthening, Balance training, Functional mobility training, Transfer training, Endurance training, Neuromuscular re-education, Gait training, Stair training, Home exercise program, Safety education & training, Patient/Caregiver education & training, Therapeutic activities, Equipment evaluation, education, & procurement  General Plan:  (6x N)    Patient Education  Patient Education: Plan of Care, Bed Mobility, Transfers, Gait, Up in Chair for All Meals, Verbal Exercise Instruction    Goals:  Patient Goals : none stated  Short Term Goals  Time Frame for Short Term Goals: by hospital d/c  Short Term Goal 1: Pt to complete supine <->sit indep for ease getting in and out of bed  Short Term Goal 2: Pt to complete sit <->Stand with RW, S and while maintainting sternal precautoins for improved safety in his environment  Short Term Goal 3: Pt to ambulate >=80' with RW and S to progress his safe mobility  Long Term Goals  Time Frame for Long Term Goals : NA due to short ELOS    Following session, patient left in safe position with all fall risk precautions in place.

## 2022-11-23 NOTE — DISCHARGE SUMMARY
Hospital Medicine Discharge Summary      Patient Identification:  Name: Sudarshan Monroy  :   MRN: [id number]  Account: [de-identified]    Patient's PCP: Uvaldo Puckett MD    Admit Date: 2022    Discharge Date:   22    Admitting Physician: No admitting provider for patient encounter. Discharge Physician: Daniela Conner MD        HPI:  Sudarshan Monroy is a 78 y.o. male with PMHx of A. Fib, CAD s/p CABG, HTN who was admitted to [address] [place] [address] on 2022 for falls, LoC. Please see chart for more details regarding HPI. Hospital Course:   Sudarshan Monroy is a 78 y.o. male who presented to [facility] to 4-5 episodes of falls with reported LoC. Patient and wife report episodes consisted of generalized clonic-tonic movements with associated urinary incontinence and post-ictal state. On arrival, CTH negative. MRI obtained during admission demonstrated age-related intracranial exam without acute abnormality. Neurology consulted, and patient started on [address] for anti-epileptic medication, but switched to Vimpat due to a concerns for aggressive behavior. Patient noted to be stable on Vimpat and no further seizure episodes while admitted. Worked with PT/OT and patient decided on home with home health. Noted to be already active with home health on discharge. Subsequently, patient was discharged with PCP and neurology follow-up. The patient was stable for discharge - all consultants were contacted and in agreement with plan for discharge. Appropriate follow up appointment was arranged prior to discharge. Please see below or view chart for more details from hospital course. Discharge Diagnoses:    #Seizure: Px 4-5 episodes of falls, LoC, generalized tonic-clonic movement, urinary incontinence since most recent discharge. CTH negative on arrival. Neurology consulted.  MRI: Age-related intracranial exam without acute abnormality -Continue Vimpat 100mg bid per neurology  #L pleural effusion: Noted on CT chest on arrival. Currently stable on RA. Thoracentesis performed, exudative per Light;s criteria. Suspect post-CABG pleural effusion, with patient confirming effusion noted previously. Any remaining effusion will take weeks for auto-resolution.   -Follow-up with culture  #CAD: S/p CABG 11/8/22. Troponin mildly elevated on arrival. EKG: new TWI V2/V3, cardiology consulted, recommended medical therapy and continue monitoring. Continued on aspirin and plavix  #COVID19: Positive test 11/16/22. Afebrile, non-toxic appearing. Baseline RA. Continue IS, acapella. No indication for further therapy at this time  #HTN  #pAF  #Subclinical hypothyroidism: TSH 5.42, T4 1.40. Recommend re-testing in 4-6 weeks. #Chronic macrocytic anemia      The patient was seen and examined on day of discharge and this discharge summary is in conjunction with any daily progress note from day of discharge. The patient understood, was in agreement, and verbalized the plan of care at time of discharge. Exam:    Vitals:   Vitals:    11/22/22 1938 11/22/22 2339 11/23/22 0330 11/23/22 0915   BP: (!) 104/50 (!) 99/57 (!) 106/55 122/63   Pulse: 66 68 72 78   Resp: 16 16 16 17   Temp: 98.6 °F (37 °C)  98.8 °F (37.1 °C) 98.8 °F (37.1 °C)   TempSrc: Oral  Oral Oral   SpO2: 99% 98% 96%    Weight:   231 lb 4.2 oz (104.9 kg)    Height:         Weight: Weight: 231 lb 4.2 oz (104.9 kg)    24 hour intake/output:  Intake/Output Summary (Last 24 hours) at 11/23/2022 1204  Last data filed at 11/22/2022 1939  Gross per 24 hour   Intake 250 ml   Output --   Net 250 ml         General appearance: Chronically ill-appearing male resting in bed in NAD  HEENT: Pupils equal, round, and reactive to light. Conjunctivae/corneas clear. Neck: Supple, with full range of motion. No jugular venous distention. Trachea midline. Respiratory:  Normal respiratory effort.  Clear to auscultation, bilaterally without Rales/Wheezes/Rhonchi. Cardiovascular: Regular rate and rhythm with normal S1/S2 without murmurs, rubs or gallops. Abdomen: Soft, non-tender, non-distended with normal bowel sounds. Musculoskeletal: passive and active ROM x 4 extremities. Extremities: No peripheral edema noted  Skin: Skin color, texture, turgor normal.  No rashes or lesions. Neurologic:  Neurovascularly intact without any focal sensory/motor deficits. Cranial nerves: II-XII intact, grossly non-focal.  Psychiatric: Alert and oriented, thought content appropriate, normal insight  Capillary Refill: Brisk,< 3 seconds   Peripheral Pulses: +2 palpable, equal bilaterally         Labs: For convenience and continuity at follow-up the following most recent labs are provided:      CBC:    Lab Results   Component Value Date/Time    WBC 8.0 11/23/2022 04:26 AM    HGB 9.5 11/23/2022 04:26 AM    HCT 29.7 11/23/2022 04:26 AM     11/23/2022 04:26 AM       Renal:    Lab Results   Component Value Date/Time     11/23/2022 04:26 AM    K 4.3 11/23/2022 04:26 AM     11/23/2022 04:26 AM    CO2 22 11/23/2022 04:26 AM    BUN 17 11/23/2022 04:26 AM    CREATININE 1.1 11/23/2022 04:26 AM    CALCIUM 8.4 11/23/2022 04:26 AM         Significant Diagnostic Studies    Radiology:   MRI BRAIN W WO CONTRAST   Final Result   Impression:   1. Age-related intracranial exam without acute abnormality, or mass    lesion. 2. Chronic sinusitis. This document has been electronically signed by: Parul Paniagua MD on    11/22/2022 10:00 PM      US THORACENTESIS Which side should the procedure be performed? Left   Final Result      Successful ultrasound-guided thoracentesis with  1.2 L of fluid drained. **This report has been created using voice recognition software. It may contain minor errors which are inherent in voice recognition technology. **          Final report electronically signed by Dr Kaye Shabazz on 11/22/2022 12:21 PM XR CHEST PA INSPIRATION 1 VW   Final Result   No pneumothorax following a left-sided thoracentesis. **This report has been created using voice recognition software. It may contain minor errors which are inherent in voice recognition technology. **      Final report electronically signed by Dr Anastasio Primrose on 11/22/2022 12:15 PM      CT HEAD WO CONTRAST   Final Result   1. No acute intracranial process. 2. Minimal changes of chronic microvascular ischemic disease and    age-related global volume loss. This document has been electronically signed by: Beka Suazo DO on    11/21/2022 05:58 AM      All CTs at this facility use dose modulation techniques and iterative    reconstructions, and/or weight-based dosing   when appropriate to reduce radiation to a low as reasonably achievable. CT CERVICAL SPINE WO CONTRAST   Final Result   1. No acute fracture involving the cervical spine. 2. ACDF of C5-7, the surgical hardware is intact. 3. Spondylitic changes of the cervical spine including facet/uncovertebral    joint arthropathy. 4. Partially visualized left pleural effusion extending to the left lung    apex. This document has been electronically signed by: Beka Suazo DO on    11/21/2022 06:05 AM      All CTs at this facility use dose modulation techniques and iterative    reconstructions, and/or weight-based dosing   when appropriate to reduce radiation to a low as reasonably achievable. CT CHEST W CONTRAST   Final Result   1. Small right and moderate left pleural effusion. The left pleural    effusion extends to the left lung apex. Passive atelectasis within the    left lung base. Mild interstitial densities of the left lung base likely    representing pulmonary edema. Recommend continued follow-up to clearing of    these findings.    2. Within the superior left mediastinum and adjacent to the coronary    artery bypass grafting there is a well geographically defined heterogeneous fluid collection. The average Hounsfield units centrally    within this collection measures proximally 35 consistent with complex    fluid. Finding may be related to coronary bypass grafting. Recommend    correlating with any prior CT imaging of the thorax. If none exists,    recommend follow-up examination in 3 months to document stability of this    finding. The fluid collection measures 4.2 x 3.5 x 3.8 cm and is best    identified on axial image 21 of series 2. No suspicious peripheral    nodularity or internal septations. 3. Moderate/severe coronary atherosclerosis. Small pericardial effusion    measuring up to 11 mm ventrally. This document has been electronically signed by: Chi Zabala DO on    11/21/2022 06:15 AM      All CTs at this facility use dose modulation techniques and iterative    reconstructions, and/or weight-based dosing   when appropriate to reduce radiation to a low as reasonably achievable. Consults:     IP CONSULT TO CARDIOLOGY  IP CONSULT TO NEUROLOGY  IP CONSULT TO SOCIAL WORK    Disposition:    [x] Home        [] TCU       [] Rehab       [] Psych       [] SNF       [] [place]       [] Other-    Condition at Discharge: stable    Code Status:  Full Code   Admitted for: Seizure    Patient Instructions:  Discharge lab work: None  Activity: activity as tolerated  Diet: ADULT DIET; Regular; 5 carb choices (75 gm/meal); Low Sodium (2 gm); 2000 ml    Follow-up visits:   Kristel Ruiz MD  [place] [address].  Raul Claros  [phone]    Follow up in 1 week(s)      [address] [facility]  [address] [facility] [address] [facility]  Follow up in 4 week(s)  New onset seizure        Discharge Medications:        Medication List        START taking these medications      lacosamide 100 MG Tabs tablet  Commonly known as: VIMPAT  Take 1 tablet by mouth 2 times daily for 60 days.      Vitamin D3 25 MCG Tabs  Take 1 tablet by mouth daily            CHANGE how you take these medications      cyanocobalamin 1000 MCG tablet  Take 1 tablet by mouth daily  Start taking on: November 24, 2022  What changed:   medication strength  how much to take     metoprolol tartrate 25 MG tablet  Commonly known as: LOPRESSOR  Take 1 tablet by mouth 2 times daily  What changed:   when to take this  additional instructions            CONTINUE taking these medications      albuterol-ipratropium  MCG/ACT Aers inhaler  Commonly known as: COMBIVENT RESPIMAT  Inhale 1 puff into the lungs every 4 hours as needed for Wheezing or Shortness of Breath     amiodarone 200 MG tablet  Commonly known as: CORDARONE  Take 1 tablet by mouth daily     aspirin EC 81 MG EC tablet  Take 1 tablet by mouth daily     clopidogrel 75 MG tablet  Commonly known as: Plavix  Take 1 tablet by mouth daily     DULoxetine 30 MG extended release capsule  Commonly known as: CYMBALTA     oxybutynin 5 MG tablet  Commonly known as: DITROPAN     pantoprazole sodium 40 MG Pack packet  Commonly known as: PROTONIX     pravastatin 80 MG tablet  Commonly known as: PRAVACHOL  Take 1 tablet by mouth once daily     pregabalin 50 MG capsule  Commonly known as: LYRICA     tamsulosin 0.4 MG capsule  Commonly known as: FLOMAX            STOP taking these medications      fish oil-omega-3 fatty acids 1000 MG capsule     furosemide 20 MG tablet  Commonly known as: Lasix     oxyCODONE 5 MG immediate release tablet  Commonly known as: ROXICODONE     potassium chloride 20 MEQ extended release tablet  Commonly known as: KLOR-CON M               Where to Get Your Medications        These medications were sent to [facility] [address], [address]  [address], [address] [place] [address]      Phone: [phone]   cyanocobalamin 1000 MCG tablet  Vitamin D3 25 MCG Tabs       You can get these medications from any pharmacy    Bring a paper prescription for each of these medications  lacosamide 100 MG Tabs tablet           Discharge Time:  Time spent on discharge is >30 minutes in the examination, evaluation, counseling, and review of medications and discharge plan. The hospital course was discussed with the patient and all questions and concerns were addressed at that time. The patient was in agreement with and verbalized understanding of the plan of care and had no additional questions or complaints. The patient was instructed to follow-up with any scheduled outpatient appointments or to report to the nearest Emergency Department if new or worsening symptoms should arise. Thank you Rajat Rosales MD for the opportunity to be involved in this patient's care.     Signed:    Electronically signed by Doen Marie MD, PGY-2 on 11/23/2022 at 12:04 PM

## 2022-11-23 NOTE — PROGRESS NOTES
I have reviewed discharge instructions with the patient and wife, both verbalized understanding. Reviewed all medication changes with wife who verbalized understanding. Follow up appts discussed w pt and eife who verbalized understanding. Prescription for vimpat given to wife.

## 2022-11-23 NOTE — PLAN OF CARE
Problem: Discharge Planning  Goal: Discharge to home or other facility with appropriate resources  11/22/2022 2046 by Alex Jha RN  Outcome: Progressing  Flowsheets (Taken 11/22/2022 1938)  Discharge to home or other facility with appropriate resources: Identify barriers to discharge with patient and caregiver     Problem: Pain  Goal: Verbalizes/displays adequate comfort level or baseline comfort level  11/22/2022 2046 by Alex Jha RN  Outcome: Progressing  Flowsheets (Taken 11/22/2022 2046)  Verbalizes/displays adequate comfort level or baseline comfort level:   Encourage patient to monitor pain and request assistance   Assess pain using appropriate pain scale   Administer analgesics based on type and severity of pain and evaluate response   Implement non-pharmacological measures as appropriate and evaluate response     Problem: Skin/Tissue Integrity  Goal: Absence of new skin breakdown  Description: 1. Monitor for areas of redness and/or skin breakdown  2. Assess vascular access sites hourly  3. Every 4-6 hours minimum:  Change oxygen saturation probe site  4. Every 4-6 hours:  If on nasal continuous positive airway pressure, respiratory therapy assess nares and determine need for appliance change or resting period. 11/22/2022 2046 by Alex Jha RN  Outcome: Progressing  Note: No signs of skin breakdown. Skin warm, dry, and intact. Mucous membranes pink and moist.  Assistance with turns/ambulation provided PRN. Will continue to monitor. Problem: Safety - Adult  Goal: Free from fall injury  11/22/2022 2046 by Alex Jha RN  Outcome: Progressing  Note: Patient remains free from falls this shift. Fall precautions in place with bed/chair exit alarmed. Fall sign posted and fall armband in place. Nonskid footwear used with transferring.  Educated patient to use call light when in need of staff assistance with transferring, ambulating, and other activities of daily living. Patient appropriately uses call light this shift. Problem: Safety - Adult  Goal: Isolation precautions  Description: Isolation precautions  11/22/2022 2046 by Nik Vences RN  Outcome: Progressing  Note: Droplet plus, covid precautions       Problem: ABCDS Injury Assessment  Goal: Absence of physical injury  11/22/2022 2046 by Nik Vences RN  Outcome: Malika Leon (Taken 11/21/2022 1722 by Poonam Shea RN)  Absence of Physical Injury: Implement safety measures based on patient assessment     Problem: Neurosensory - Adult  Goal: Achieves stable or improved neurological status  11/22/2022 2046 by Nik Vences RN  Outcome: Progressing  Flowsheets (Taken 11/22/2022 1938)  Achieves stable or improved neurological status:   Assess for and report changes in neurological status   Monitor temperature, glucose, and sodium. Initiate appropriate interventions as ordered     Problem: Neurosensory - Adult  Goal: Absence of seizures  11/22/2022 2046 by Nik Vences RN  Outcome: Progressing  Flowsheets (Taken 11/22/2022 1938)  Absence of seizures: Monitor for seizure activity.   If seizure occurs, document type and location of movements and any associated apnea     Problem: Neurosensory - Adult  Goal: Remains free of injury related to seizures activity  11/22/2022 2046 by Nik Vences RN  Outcome: Progressing  Flowsheets (Taken 11/22/2022 1938)  Remains free of injury related to seizure activity:   Maintain airway, patient safety  and administer oxygen as ordered   Monitor patient for seizure activity, document and report duration and description of seizure to Licensed Independent Practitioner     Problem: Cardiovascular - Adult  Goal: Maintains optimal cardiac output and hemodynamic stability  11/22/2022 2046 by Nik Vences RN  Outcome: Progressing  Flowsheets (Taken 11/22/2022 2046)  Maintains optimal cardiac output and hemodynamic stability:   Monitor blood pressure and heart rate   Assess for signs of decreased cardiac output     Problem: Cardiovascular - Adult  Goal: Absence of cardiac dysrhythmias or at baseline  11/22/2022 2046 by Seb Wallace RN  Outcome: Progressing  Flowsheets (Taken 11/22/2022 2046)  Absence of cardiac dysrhythmias or at baseline:   Monitor cardiac rate and rhythm   Assess for signs of decreased cardiac output     Problem: Hematologic - Adult  Goal: Maintains hematologic stability  11/22/2022 2046 by Seb Wallace RN  Outcome: Progressing  Flowsheets (Taken 11/22/2022 1938)  Maintains hematologic stability: Assess for signs and symptoms of bleeding or hemorrhage
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
## 2022-11-23 NOTE — CARE COORDINATION
11/23/22, 3:00 PM EST    Patient goals/plan/ treatment preferences discussed by  and . Patient goals/plan/ treatment preferences reviewed with patient/ family. Patient/ family verbalize understanding of discharge plan and are in agreement with goal/plan/treatment preferences. Understanding was demonstrated using the teach back method. AVS provided by RN at time of discharge, which includes all necessary medical information pertaining to the patients current course of illness, treatment, post-discharge goals of care, and treatment preferences. Services At/After Discharge: Home Health, Nursing service, OT, and PT- [facility]       IMM Letter  IMM Letter given to Patient/Family/Significant other/Guardian/POA/by[de-identified] CM  IMM Letter date given[de-identified] 11/23/22  IMM Letter time given[de-identified] 1234       Juliana Morrison will be discharged today to home with his wife and current [place]  for RN, PT and OT services.  Made them aware of discharge today, faxed discharge summary and AVS.

## 2022-11-23 NOTE — RT PROTOCOL NOTE
RT Inhaler-Nebulizer Bronchodilator Protocol Note    There is a bronchodilator order in the chart from a provider indicating to follow the RT Bronchodilator Protocol and there is an Initiate RT Inhaler-Nebulizer Bronchodilator Protocol order as well (see protocol at bottom of note). CXR Findings:  No results found. The findings from the last RT Protocol Assessment were as follows:   History Pulmonary Disease: Smoker 15 pack years or more  Respiratory Pattern: Regular pattern and RR 12-20 bpm  Breath Sounds: Clear breath sounds  Cough: Strong, spontaneous, non-productive  Indication for Bronchodilator Therapy: On home bronchodilators  Bronchodilator Assessment Score: 1    Aerosolized bronchodilator medication orders have been revised according to the RT Inhaler-Nebulizer Bronchodilator Protocol below. Respiratory Therapist to perform RT Therapy Protocol Assessment initially then follow the protocol. Repeat RT Therapy Protocol Assessment PRN for score 0-3 or on second treatment, BID, and PRN for scores above 3. No Indications - adjust the frequency to every 6 hours PRN wheezing or bronchospasm, if no treatments needed after 48 hours then discontinue using Per Protocol order mode. If indication present, adjust the RT bronchodilator orders based on the Bronchodilator Assessment Score as indicated below. Use Inhaler orders unless patient has one or more of the following: on home nebulizer, not able to hold breath for 10 seconds, is not alert and oriented, cannot activate and use MDI correctly, or respiratory rate 25 breaths per minute or more, then use the equivalent nebulizer order(s) with same Frequency and PRN reasons based on the score. If a patient is on this medication at home then do not decrease Frequency below that used at home.     0-3 - enter or revise RT bronchodilator order(s) to equivalent RT Bronchodilator order with Frequency of every 4 hours PRN for wheezing or increased work of breathing using Per Protocol order mode. 4-6 - enter or revise RT Bronchodilator order(s) to two equivalent RT bronchodilator orders with one order with BID Frequency and one order with Frequency of every 4 hours PRN wheezing or increased work of breathing using Per Protocol order mode. 7-10 - enter or revise RT Bronchodilator order(s) to two equivalent RT bronchodilator orders with one order with TID Frequency and one order with Frequency of every 4 hours PRN wheezing or increased work of breathing using Per Protocol order mode. 11-13 - enter or revise RT Bronchodilator order(s) to one equivalent RT bronchodilator order with QID Frequency and an Albuterol order with Frequency of every 4 hours PRN wheezing or increased work of breathing using Per Protocol order mode. Greater than 13 - enter or revise RT Bronchodilator order(s) to one equivalent RT bronchodilator order with every 4 hours Frequency and an Albuterol order with Frequency of every 2 hours PRN wheezing or increased work of breathing using Per Protocol order mode. RT to enter RT Home Evaluation for COPD & MDI Assessment order using Per Protocol order mode.     Electronically signed by Rashmi Wood RCP on 11/23/2022 at 7:40 AM

## 2022-11-23 NOTE — PROGRESS NOTES
Physician Progress Note      PATIENT:               Brandon Escamilla  CSN #:                  [id number]  :                       1945  ADMIT DATE:       2022 8:43 AM  [address] DATE:        2022 3:40 PM  RESPONDING  PROVIDER #:        Heidy Blackwell TEXT:    Pt admitted with COVID-19. Noted documentation of pleural effusion in DC   summary. If possible, please document in progress notes and discharge summary:    The medical record reflects the following:  Risk Factors: Covid-19, recent CABG  Clinical Indicators: CXR  shows \"Moderate left pleural effusion\". Patient   SOB with exertion.   Treatment: home lasix continued, SpO2 monitoring, CXR    Thank you,  Eric Palacios MSN, RN, CCDS, [facility]  [phone]  Options provided:  -- Pleural effusion not clinically significant  -- Pleural effusion is clinically significant  -- Other - I will add my own diagnosis  -- Disagree - Not applicable / Not valid  -- Disagree - Clinically unable to determine / Unknown  -- Refer to Clinical Documentation Reviewer    PROVIDER RESPONSE TEXT:    Moderate left pleural effusion, present at recent hospitalization for CABG,   improving    Query created by: Yousif Dickerson on 2022 9:44 AM      Electronically signed by:  Gillian Kussmaul 2022 1:34 PM

## 2022-11-25 LAB — LEUKEMIA/LYMPHOMA PHENOTYPING MISC: NORMAL

## 2022-11-26 LAB
BLOOD CULTURE, ROUTINE: NORMAL
BLOOD CULTURE, ROUTINE: NORMAL

## 2022-11-27 LAB
ANAEROBIC CULTURE: NORMAL
BODY FLUID CULTURE, STERILE: NORMAL
FUNGUS IDENTIFIED: NORMAL
FUNGUS SMEAR: NORMAL
GRAM STAIN RESULT: NORMAL

## 2022-11-28 ENCOUNTER — TELEPHONE (OUTPATIENT)
Dept: CARDIOTHORACIC SURGERY | Age: 77
End: 2022-11-28

## 2022-11-28 ENCOUNTER — HOSPITAL ENCOUNTER (OUTPATIENT)
Age: 77
Discharge: HOME | End: 2022-11-28
Payer: MEDICARE

## 2022-11-28 ENCOUNTER — HOSPITAL ENCOUNTER (OUTPATIENT)
Dept: GENERAL RADIOLOGY | Age: 77
Discharge: HOME | End: 2022-11-28
Payer: MEDICARE

## 2022-11-28 DIAGNOSIS — Z95.1 S/P CABG (CORONARY ARTERY BYPASS GRAFT): ICD-10-CM

## 2022-11-28 DIAGNOSIS — R79.9 ABNORMAL BLOOD CHEMISTRY: Primary | ICD-10-CM

## 2022-11-28 DIAGNOSIS — Z95.1 S/P CABG X 2: Primary | ICD-10-CM

## 2022-11-28 LAB
ANION GAP SERPL CALCULATED.3IONS-SCNC: 12 MEQ/L (ref 8–16)
BUN BLDV-MCNC: 17 MG/DL (ref 7–22)
CALCIUM SERPL-MCNC: 9.4 MG/DL (ref 8.5–10.5)
CHLORIDE BLD-SCNC: 106 MEQ/L (ref 98–111)
CO2: 24 MEQ/L (ref 23–33)
CREAT SERPL-MCNC: 1.1 MG/DL (ref 0.4–1.2)
ERYTHROCYTE [DISTWIDTH] IN BLOOD BY AUTOMATED COUNT: 15.1 % (ref 11.5–14.5)
ERYTHROCYTE [DISTWIDTH] IN BLOOD BY AUTOMATED COUNT: 53.7 FL (ref 35–45)
GFR SERPL CREATININE-BSD FRML MDRD: > 60 ML/MIN/1.73M2
GLUCOSE BLD-MCNC: 124 MG/DL (ref 70–108)
HCT VFR BLD CALC: 36.3 % (ref 42–52)
HEMOGLOBIN: 11.7 GM/DL (ref 14–18)
MCH RBC QN AUTO: 31.7 PG (ref 26–33)
MCHC RBC AUTO-ENTMCNC: 32.2 GM/DL (ref 32.2–35.5)
MCV RBC AUTO: 98.4 FL (ref 80–94)
PLATELET # BLD: 459 THOU/MM3 (ref 130–400)
PMV BLD AUTO: 9.7 FL (ref 9.4–12.4)
POTASSIUM SERPL-SCNC: 4 MEQ/L (ref 3.5–5.2)
RBC # BLD: 3.69 MILL/MM3 (ref 4.7–6.1)
SODIUM BLD-SCNC: 142 MEQ/L (ref 135–145)
WBC # BLD: 9.1 THOU/MM3 (ref 4.8–10.8)

## 2022-11-28 PROCEDURE — 80048 BASIC METABOLIC PNL TOTAL CA: CPT

## 2022-11-28 PROCEDURE — 71046 X-RAY EXAM CHEST 2 VIEWS: CPT

## 2022-11-28 PROCEDURE — 36415 COLL VENOUS BLD VENIPUNCTURE: CPT

## 2022-11-28 PROCEDURE — 85027 COMPLETE CBC AUTOMATED: CPT

## 2022-11-28 NOTE — TELEPHONE ENCOUNTER
Received call from uSzi Worley with [facility] cardiac rehab-  They received a referral from [facility] cardiac rehab instead of OP. Informed her that I would be faxing over an OP referral  Fax [phone]    Please agree to order.

## 2022-11-29 ENCOUNTER — FOLLOWUP TELEPHONE ENCOUNTER (OUTPATIENT)
Dept: ADMINISTRATIVE | Age: 77
End: 2022-11-29

## 2022-11-29 NOTE — PROGRESS NOTES
Phone call made to patient to follow up on an a high fluoro dose from his cardiac cath/intervention procedure on 11/8/2022. Spoke with the patient and Domenica Neff, his significant other, they had me on speaker phone. I explained the reason for my call and asked if he had noticed and skin redness or irritation, similar to a sunburn, on his back or chest areas since the procedure. He stated, \"no he had not noticed any problems. \" Denied having any side effects from the radiation exposure. Stated he had seen his family doctor this morning and been having shortness of breath. His doctor told him everything was checking out okay. I instructed him to call the doctor back if his symptoms worsened. He stated the understanding. Stated he had follow up appointments with [facility] and they planned to go to those appointments. Directed him to call his doctor if they had any questions or concerns. They voiced the understanding.

## 2022-12-01 RX ORDER — PRAVASTATIN SODIUM 80 MG/1
TABLET ORAL
Qty: 90 TABLET | Refills: 0 | Status: SHIPPED | OUTPATIENT
Start: 2022-12-01

## 2022-12-01 RX ORDER — CLOPIDOGREL BISULFATE 75 MG/1
TABLET ORAL
Qty: 90 TABLET | Refills: 0 | Status: SHIPPED | OUTPATIENT
Start: 2022-12-01

## 2022-12-01 NOTE — TELEPHONE ENCOUNTER
Patient would like a sooner appointment with [facility] due to having open heart surgery.  Please advise

## 2022-12-07 ENCOUNTER — OFFICE VISIT (OUTPATIENT)
Dept: NEUROLOGY | Age: 77
End: 2022-12-07
Payer: MEDICARE

## 2022-12-07 VITALS
OXYGEN SATURATION: 96 % | DIASTOLIC BLOOD PRESSURE: 85 MMHG | HEART RATE: 70 BPM | WEIGHT: 222 LBS | SYSTOLIC BLOOD PRESSURE: 130 MMHG | BODY MASS INDEX: 35.68 KG/M2 | HEIGHT: 66 IN

## 2022-12-07 DIAGNOSIS — R25.1 TREMOR: ICD-10-CM

## 2022-12-07 DIAGNOSIS — R56.9 SEIZURE-LIKE ACTIVITY (HCC): Primary | ICD-10-CM

## 2022-12-07 DIAGNOSIS — R55 SYNCOPE AND COLLAPSE: ICD-10-CM

## 2022-12-07 DIAGNOSIS — R56.9 SEIZURE (HCC): ICD-10-CM

## 2022-12-07 PROCEDURE — 3074F SYST BP LT 130 MM HG: CPT | Performed by: PSYCHIATRY & NEUROLOGY

## 2022-12-07 PROCEDURE — 99204 OFFICE O/P NEW MOD 45 MIN: CPT | Performed by: PSYCHIATRY & NEUROLOGY

## 2022-12-07 PROCEDURE — 3078F DIAST BP <80 MM HG: CPT | Performed by: PSYCHIATRY & NEUROLOGY

## 2022-12-07 RX ORDER — PRIMIDONE 50 MG/1
50 TABLET ORAL NIGHTLY
Qty: 30 TABLET | Refills: 3 | Status: SHIPPED | OUTPATIENT
Start: 2022-12-07

## 2022-12-07 RX ORDER — TIZANIDINE 4 MG/1
4 TABLET ORAL EVERY 6 HOURS PRN
COMMUNITY

## 2022-12-07 RX ORDER — CLOPIDOGREL BISULFATE 75 MG/1
75 TABLET ORAL DAILY
Qty: 90 TABLET | Refills: 3 | Status: SHIPPED | OUTPATIENT
Start: 2022-12-07

## 2022-12-07 RX ORDER — METRONIDAZOLE 10 MG/G
GEL TOPICAL DAILY
COMMUNITY

## 2022-12-07 RX ORDER — AMIODARONE HYDROCHLORIDE 200 MG/1
200 TABLET ORAL DAILY
Qty: 90 TABLET | Refills: 3 | Status: SHIPPED | OUTPATIENT
Start: 2022-12-07

## 2022-12-07 RX ORDER — NITROGLYCERIN 0.4 MG/1
0.4 TABLET SUBLINGUAL EVERY 5 MIN PRN
COMMUNITY
End: 2022-12-07 | Stop reason: SDUPTHER

## 2022-12-07 RX ORDER — NITROGLYCERIN 0.4 MG/1
0.4 TABLET SUBLINGUAL EVERY 5 MIN PRN
Qty: 25 TABLET | Refills: 3 | Status: SHIPPED | OUTPATIENT
Start: 2022-12-07

## 2022-12-07 RX ORDER — LACOSAMIDE 100 MG/1
100 TABLET ORAL 2 TIMES DAILY
Qty: 60 TABLET | Refills: 1 | Status: SHIPPED | OUTPATIENT
Start: 2022-12-07 | End: 2023-02-05

## 2022-12-07 NOTE — TELEPHONE ENCOUNTER
Patients spouse stated that he is out of the clopidegrel but the insurance isnt authorizing it right now. She also stated he had open heart surgery and was put on amiodarone 200 mg twice daily.      Please advise

## 2022-12-07 NOTE — PATIENT INSTRUCTIONS
Continue with Vimpat 100 mg two times a day. Refills given  Start Primidone 50 mg nightly  No driving, swimming, operating heavy machinery or compromising heights until event free for 6 months. Report any new events. Call if any questions. Continue following with cardiology  Call with any new symptoms or concerns. Follow up in 2 months.

## 2022-12-07 NOTE — TELEPHONE ENCOUNTER
Krishan Cortes called requesting a refill on the following medications:  Requested Prescriptions     Pending Prescriptions Disp Refills    clopidogrel (PLAVIX) 75 MG tablet 90 tablet 0     Sig: Take 1 tablet by mouth daily    nitroGLYCERIN (NITROSTAT) 0.4 MG SL tablet 25 tablet      Sig: Place 1 tablet under the tongue every 5 minutes as needed for Chest pain up to max of 3 total doses.  If no relief after 1 dose, call 911.    amiodarone (CORDARONE) 200 MG tablet 30 tablet 0     Sig: Take 1 tablet by mouth 2 times daily    metoprolol tartrate (LOPRESSOR) 25 MG tablet 60 tablet 3     Sig: Take 1 tablet by mouth 2 times daily     Pharmacy verified:  .pv  [address] [facility] [address] [facility] [phone] [place] [phone]    Date of last visit: 10/19/2022  Date of next visit (if applicable): 18/92/1393

## 2022-12-08 NOTE — PROCEDURES
[address] [place] [address]                            CARDIAC CATHETERIZATION    PATIENT NAME: Fred Quevedo                 :        1945  MED REC NO:   [id number]                           ROOM:       0016  ACCOUNT NO:   [de-identified]                           ADMIT DATE: 2022  PROVIDER:     Mark Perez MD    DATE OF PROCEDURE:  2022    PROCEDURES PERFORMED:  Coronary angiogram, PCI of diagonal branch, PCI  of proximal LAD. INDICATION FOR THE STUDY:  Angina III symptoms; abnormal myocardial  perfusion imaging; abnormal cath, referred by primary cardiologist for  PCI. DESCRIPTION OF PROCEDURE:  After written informed consent was obtained,  the patient was brought to the cardiac catheterization laboratory in a  fasting, nonsedated state. Preprocedure time-out was performed. 2%  lidocaine was used to anesthetize subcutaneous tissue overlying the  right femoral artery. Using ultrasound and fluoroscopy guidance with  the assistance of micropuncture kit, we were able to get access to the  right common femoral artery. Once the sheath was in place, a femoral  angiogram was performed which showed good common femoral artery stick. EQUIPMENTS USED:  Standard 6-[place] EBU3.5 guide catheter, a Runthrough  wire, Medtronic Qasim drug-eluting stents. ESTIMATED BLOOD LOSS DURING THIS PROCEDURE:  Less than 50 mL. MEDICATIONS:  Please see EMR. IV heparin was given and ACT was maintained in the 250 to 300 range. For complete diagnostic coronary angiogram details, please refer to Dr. Daniela Lazar report on 10/26/2022. An EBU3.5 guide catheter was used to engage the left main artery.   A  Runthrough wire was used to cross the critical stenosis in the first  diagonal and tip of the wire was placed in the distal portion of the  diagonal, and then another Runthrough wire was used to cross the  critical stenosis in the LAD and tip of the wire was placed in the  distal portion of the LAD. After this, we performed angioplasty of the  first diagonal in-stent restenosis and then placed a 2.25 x 38 mm  Medtronic Qasim drug-eluting stent across the first diagonal into the  ostium of the diagonal.  The stent was post-dilated to high pressures to  ensure good vessel wall apposition. Repeat angiogram of this diagonal  vessel showed good flow in the diagonal.    At this point, we then pre-dilated the LAD disease and then placed a  2.75 x 38 mm Medtronic Qasim drug-eluting stent across the proximal mid  LAD lesion in a provisional fashion with jailing the diagonal branch. After this, the stent was post-dilated to high pressures to ensure good  vessel wall apposition. We then used the wire that was in the LAD to  cross it to the diagonal branch and then wanted to perform the  angioplasty of the strut that is jailing the diagonal branch. This was  successful. After this, we wanted to re-cross the diagonal Runthrough  wire into the LAD. While attempting to do this, the Runthrough wire was  stuck in the LAD stent. Multiple attempts were made to remove this wire  but we were unsuccessful. At that point, we used angioplasty balloon so  as to dislodge the wire from the LAD stent. It has somehow seem to have  intertwined with the LAD stent and distal portion of the Runthrough wire  was malformed and intertwined with the LAD stent. Multiple attempts  were made to remove this LAD wire but this was unsuccessful. At that  point, we used a Turnpike microcatheter so as to get some leverage and  remove the wire. Multiple attempts were done with Turnpike  microcatheter, again this was unsuccessful. We used series of other  wires so as to dislodge this wire, all of which were unsuccessful.   We  used Sprinter as well as the push balloons to get on this wire to  dislodge the wire, again unsuccessful, and while we were attempting to  pulling the wire, the LAD stent has deformed in a way, but there was  still good ROB-3 flow throughout the LAD and diagonal vessels. The  total time we spent in trying to remove this wire was close to three  hours, and 400 mL of contrast was used. During the entire time, the  patient did not have any significant symptoms and due to this as well as  using excessive fluoro time, we decided to abort the procedure. A piece  of the Runthrough wire was left behind in the LAD. We did attempt  multiple times to use another wire to get into the LAD so as to squash  the wire behind the stent, but we were unable to cross this malformed  LAD stent. All wires were removed. The piece of the wire of the distal  portion of the Runthrough was still stuck in the LAD in the mid portion. There was still good ROB-3 flow throughout the entire vessel. Wires  were removed. Guide catheter was removed. The sheath was left in place  so as if the patient had symptoms, there was understanding with the  patient and family members that we would re-attempt to stent the LAD  again. Also, we discussed with the patient extensively as well as the  family members about possible LIMA to LAD as an appropriate option. The  patient was understandable about this and he did not mind about getting  LIMA to LAD as a graft. We did call the CTS surgeons so as to have them  as a backup. We also discussed with the patient if the patient is  asymptomatic while he was in the hospital, we would discharge him and  bring him back in one week to re-attempt this stenting of the LAD. All  this was understood. All catheters were removed, and the guide was left  in place so as to be pulled with manual pressure. He was transferred to his room in stable condition. SUMMARY:  1. Successful PCI of first diagonal with Medtronic Qasim drug-eluting  stent. 2.  Successful PCI of mid to prox LAD with Medtronic [place] drug-eluting  stent.   3.  He did have a small piece of Runthrough wire stuck in the proximal  LAD stent and was intertwined with it, which malformed the LAD stent. 4.  Attempt at re-crossing the LAD stent was unsuccessful. RECOMMENDATIONS:  1. Monitor the patient's symptoms closely. Currently, he is  asymptomatic post procedure and during procedure. 2.  We will get CT Surgery evaluation for possible coronary artery  bypass graft, if the patient becomes symptomatic, with LIMA to LAD  graft. 3.  If the patient is asymptomatic, we will discharge home and we will  bring back in one week to re-attempt stenting of the proximal LAD. 4.  If the patient is asymptomatic by tomorrow morning, we will start on  oral anticoagulation at low dose to avoid any chance of stent  thrombosis. 5.  All procedure details and findings of the coronary stenting were  discussed with the patient and family members. 6.  Discussed about LIMA to LAD coronary bypass as a very likely option  with the patient. He has agreed to proceed if symptomatic. 7.  IV fluids. 8.  Monitor groin access site for bleeding and hematoma. All procedure details and management plans were discussed in detail with  the patient and family members, and they were in agreement with the  plan.         Kassandra Bettencourt MD    D: 12/08/2022 9:16:49       T: 12/08/2022 11:32:06     [email or web address]  Job#: [id number]     Doc#: [id number]    CC:

## 2022-12-13 ENCOUNTER — HOSPITAL ENCOUNTER (OUTPATIENT)
Dept: GENERAL RADIOLOGY | Age: 77
Discharge: HOME | End: 2022-12-13
Payer: MEDICARE

## 2022-12-13 ENCOUNTER — OFFICE VISIT (OUTPATIENT)
Dept: CARDIOTHORACIC SURGERY | Age: 77
End: 2022-12-13

## 2022-12-13 ENCOUNTER — HOSPITAL ENCOUNTER (OUTPATIENT)
Age: 77
Discharge: HOME | End: 2022-12-13
Payer: MEDICARE

## 2022-12-13 VITALS
SYSTOLIC BLOOD PRESSURE: 137 MMHG | HEART RATE: 63 BPM | OXYGEN SATURATION: 96 % | HEIGHT: 66 IN | BODY MASS INDEX: 36.52 KG/M2 | DIASTOLIC BLOOD PRESSURE: 85 MMHG | WEIGHT: 227.2 LBS

## 2022-12-13 DIAGNOSIS — R06.02 SOB (SHORTNESS OF BREATH): Primary | ICD-10-CM

## 2022-12-13 DIAGNOSIS — R06.02 SOB (SHORTNESS OF BREATH): ICD-10-CM

## 2022-12-13 PROCEDURE — 71046 X-RAY EXAM CHEST 2 VIEWS: CPT

## 2022-12-13 PROCEDURE — 99024 POSTOP FOLLOW-UP VISIT: CPT | Performed by: PHYSICIAN ASSISTANT

## 2022-12-13 NOTE — PROGRESS NOTES
CT/CV Surgery Follow Up Office Visit      Patient's Name/Date of Birth: Corrine Pierson [birth year] (78 y.o.)    CC:   Chief Complaint   Patient presents with    Follow-up     S/p CABG x 2  [phone] with     Results     CXR/ Labs      PCP: Geronimo Wilson MD    Date: December 13, 2022     HPI:   We had the pleasure of seeing Corrine Pierson in the office today, as you know this is a very pleasant 78y.o. year old male with a history of CAD requiring emergency 2V CABG on 11/8/22. The pt denies chest pressure, fever, chills, N/V/D. He does have mild LOAIZA with activity since his surgery. CXR PA & LATERAL: 11/28/22  1. Small left pleural effusion is new from prior examination. Past Medical History:  Balbir Cobian  has a past medical history of Anxiety, Bradycardia, Heart disease, Hyperlipidemia, Hypertension, Medtronic linq loop recorder, Neuropathy, Paroxysmal atrial fibrillation ([address] Spondylolisthesis, and Syncopal episodes. Past Surgical History:  The patient  has a past surgical history that includes Eye surgery; hernia repair; back surgery (2008); Cholecystectomy; angioplasty (07' 10'); Tonsillectomy; Knee arthroscopy (1990); eye surgery; Coronary angioplasty with stent (6180,0955); TURP (2015); Shoulder arthroscopy; Cataract removal (05/04/2021); and Coronary artery bypass graft (N/A, 11/8/2022). Allergies: The patient is allergic to adhesive tape. Medications:  Prior to Admission medications    Medication Sig Start Date End Date Taking? Authorizing Provider   tiZANidine (ZANAFLEX) 4 MG tablet Take 4 mg by mouth every 6 hours as needed   Yes Historical Provider, MD   metroNIDAZOLE (METROGEL) 1 % gel Apply topically daily Apply topically daily. Yes Historical Provider, MD   primidone (MYSOLINE) 50 MG tablet Take 1 tablet by mouth nightly 12/7/22  Yes MARIZOL Fay CNP   lacosamide (VIMPAT) 100 MG TABS tablet Take 1 tablet by mouth 2 times daily for 60 days.  12/7/22 2/5/23 Yes MARIZOL [place] - CNP   clopidogrel (PLAVIX) 75 MG tablet Take 1 tablet by mouth daily 12/7/22  Yes Iman Rabago MD   nitroGLYCERIN (NITROSTAT) 0.4 MG SL tablet Place 1 tablet under the tongue every 5 minutes as needed for Chest pain up to max of 3 total doses. If no relief after 1 dose, call 911. 12/7/22  Yes Iman Rabago MD   amiodarone (CORDARONE) 200 MG tablet Take 1 tablet by mouth daily 12/7/22  Yes Iman Rabago MD   metoprolol tartrate (LOPRESSOR) 25 MG tablet Take 1 tablet by mouth 2 times daily 12/7/22  Yes Iman Rabago MD   pravastatin (PRAVACHOL) 80 MG tablet Take 1 tablet by mouth once daily 12/1/22  Yes Iman Rabago MD   vitamin B-12 1000 MCG tablet Take 1 tablet by mouth daily 11/24/22  Yes Tatyana Sosa MD   Cholecalciferol (VITAMIN D) 25 MCG TABS Take 1 tablet by mouth daily 11/23/22 12/23/22 Yes Tatyana Sosa MD   albuterol-ipratropium (COMBIVENT RESPIMAT)  MCG/ACT AERS inhaler Inhale 1 puff into the lungs every 4 hours as needed for Wheezing or Shortness of Breath 11/18/22  Yes Homar Mcdowell DO   aspirin EC 81 MG EC tablet Take 1 tablet by mouth daily 11/15/22  Yes Horacio Avila PA-C   pantoprazole sodium (PROTONIX) 40 MG PACK packet Take 40 mg by mouth every morning (before breakfast)   Yes Historical Provider, MD   pregabalin (LYRICA) 50 MG capsule Take 100 mg by mouth 3 times daily. Yes Historical Provider, MD   DULoxetine (CYMBALTA) 30 MG extended release capsule Take 60 mg by mouth daily    Yes Historical Provider, MD   oxybutynin (DITROPAN) 5 MG tablet Take 5 mg by mouth nightly   Yes Historical Provider, MD   tamsulosin (FLOMAX) 0.4 MG capsule Take 0.4 mg by mouth daily   Yes Historical Provider, MD       Family History:   This patient's family history includes Asthma in his brother; Cancer in his brother, father, and mother; Heart Disease (age of onset: 62) in his mother; Heart Disease (age of onset: 61) in his father; High Cholesterol in his father and mother. Social History:  Warren Morgan  reports that he quit smoking about 22 years ago. His smoking use included cigarettes. He has a 75.00 pack-year smoking history. He has never used smokeless tobacco. He reports current alcohol use. He reports that he does not use drugs. Vital Signs:   /85   Pulse 63   Ht 5' 6\" (1.676 m)   Wt 227 lb 3.2 oz (103.1 kg)   SpO2 96%   BMI 36.67 kg/m²     ROS:   Constitutional: Negative for activity change, chills, fatigue, fever and unexpected weight change. Respiratory: Negative for apnea, shortness of breath, wheezing and stridor. Cardiovascular: Negative for chest pain, palpitations and leg swelling. Gastrointestinal: Negative for hematochezia, melana, constipation, and N/V/D. Musculoskeletal: Negative for myalgias  Skin: Negative for color change, rash and wound. Neurological: Negative for dizziness or syncope. Physical Exam:  General appearance:  No acute distress, appears stated age and cooperative. Neck: No jugular venous distention. Trachea midline. No carotid bruits. Respiratory:  Normal respiratory effort. Clear to auscultation, bilaterally without Rales/Wheezes/Rhonch. Cardiovascular:  Regular rate and rhythm with normal S1/S2 without murmurs, rubs or gallops. Abdomen: Soft, non-tender, non-distended with normal bowel sounds. Ext: No clubbing, cyanosis or edema bilaterally. Full range of motion without deformity. Skin: Skin color, texture, turgor normal.  No rashes or lesions. Neurologic:  Neurovascularly intact without any focal sensory/motor deficits. Psychiatric:  Alert and oriented, thought content appropriate, normal insight. Capillary Refill: Brisk,< 3 seconds   Peripheral Pulses: +2 palpable, equal bilaterally   Incisions: Clean, dry, and intact. Sternum with no dehiscence.      Labs:    CBC:  Lab Results   Component Value Date/Time    WBC 9.1 11/28/2022 10:43 AM    HGB 11.7 11/28/2022 10:43 AM    HCT 36.3 11/28/2022 10:43 AM    MCV 98.4 11/28/2022 10:43 AM     11/28/2022 10:43 AM    INR 1.14 11/21/2022 02:50 PM     BMP:   Lab Results   Component Value Date/Time     11/28/2022 10:43 AM    K 4.0 11/28/2022 10:43 AM    K 4.3 11/23/2022 04:26 AM     11/28/2022 10:43 AM    CO2 24 11/28/2022 10:43 AM    BUN 17 11/28/2022 10:43 AM    CREATININE 1.1 11/28/2022 10:43 AM    MG 2.0 11/22/2022 03:32 AM     Active Problem List  Patient Active Problem List   Diagnosis    Hypertension    Hyperlipidemia    Anxiety    Abnormal findings on cardiac catheterization    Spondylolisthesis    CAD (coronary artery disease) cath 06/2011 40 to 50% stenosis in mid LAD, Patent diagonal stent,40 to 50% Mid Lcx stenosis,patent RCA stent EF 55%    GERD (gastroesophageal reflux disease)    Syncopal episodes    Paroxysmal atrial fibrillation (HCC)    [employer] linq loop recorder    Bradycardia    Angina of effort ([address]    Status post angioplasty with stent    S/P CABG x 2    CAD in native artery    COVID    Syncope, unspecified syncope type    Seizure (HCC)    Elevated troponin    Abnormal EKG     Assessment:  CAD  -  S/P 2V CABG on 11/8/22    Plan 12/13/22:  Continue current medical therapy. D/c'd Amiodarone. Will hold off on restarting Sotalol and just continue with Metoprolol at this time. 2.  CXR and labs reviewed. Will order another CXR today to evaluate for any effusion. 3.  Sternal precautions are still in place for 2 full months postop with no heavy lifting, but they are cleared to start driving locally. 4.  Importance of cardiac rehab recommended. 5.  Follow up office visit prn     Thank you for allowing us to be involved in the patient's care.     Electronically by Werner David PA-C  on 12/13/2022 at 11:40 AM

## 2022-12-20 NOTE — PROGRESS NOTES
Chief Complaint   Patient presents with    Consultation     Seizure          Zulma Contreras is a 78 y.o. male who presents today for evaluation of seizure like activity that began 11/2022. He had gotten up to walk when he suddenly developed tunnel vision and he passed out. He became rigid and convulsed. No tongue biting. He did have bladder incontinence. He had a total of 5 similar episodes prior to coming to hospital. MRI brain done W/WO contrast 11/22/22 showed no acute findings. EEG done 11/21/22 was normal. Of note, he had previous history of  PCI and CABG earlier in November. He was started on keppra, however he did not tolerate this due to agitation  and was subsequently started on vimpat. He denies any previous history of seizure in the past. He does have previous history of extensive cardiac history including a-fib with RVR,  hypotension, and bradycardia. He was also found to have Covid infection. His sleep is poor and interrupted, he wakes up feeling tired from sleep. He denies chest pain. No shortness of breath, no neck pain. No vision changes. No dysphagia. No fever. No rash. No weight loss. History provided by patient accompanied by his wife.         Past Medical History:   Diagnosis Date    Anxiety     Bradycardia 4/8/2016    Heart disease     Hyperlipidemia     Hypertension     Medtronic linq loop recorder 4/8/2016    Neuropathy     Paroxysmal atrial fibrillation (HCC) 9/23/2015    Spondylolisthesis     Syncopal episodes 9/23/2015       Patient Active Problem List   Diagnosis    Hypertension    Hyperlipidemia    Anxiety    Abnormal findings on cardiac catheterization    Spondylolisthesis    CAD (coronary artery disease) cath 06/2011 40 to 50% stenosis in mid LAD, Patent diagonal stent,40 to 50% Mid Lcx stenosis,patent RCA stent EF 55%    GERD (gastroesophageal reflux disease)    Syncopal episodes    Paroxysmal atrial fibrillation (HCC)    Medtronic linq loop recorder    Bradycardia    Angina of effort [facility])    Status post angioplasty with stent    S/P CABG x 2    CAD in native artery    COVID    Syncope, unspecified syncope type    Seizure (HCC)    Elevated troponin    Abnormal EKG       Allergies   Allergen Reactions    Adhesive Tape Rash       Current Outpatient Medications   Medication Sig Dispense Refill    nitroGLYCERIN (NITROSTAT) 0.4 MG SL tablet Place 0.4 mg under the tongue every 5 minutes as needed for Chest pain up to max of 3 total doses. If no relief after 1 dose, call 911. tiZANidine (ZANAFLEX) 4 MG tablet Take 4 mg by mouth every 6 hours as needed      metroNIDAZOLE (METROGEL) 1 % gel Apply topically daily Apply topically daily. clopidogrel (PLAVIX) 75 MG tablet Take 1 tablet by mouth once daily 90 tablet 0    pravastatin (PRAVACHOL) 80 MG tablet Take 1 tablet by mouth once daily 90 tablet 0    vitamin B-12 1000 MCG tablet Take 1 tablet by mouth daily 30 tablet 0    Cholecalciferol (VITAMIN D) 25 MCG TABS Take 1 tablet by mouth daily 30 tablet 0    lacosamide (VIMPAT) 100 MG TABS tablet Take 1 tablet by mouth 2 times daily for 60 days. 60 tablet 1    albuterol-ipratropium (COMBIVENT RESPIMAT)  MCG/ACT AERS inhaler Inhale 1 puff into the lungs every 4 hours as needed for Wheezing or Shortness of Breath 1 each 1    metoprolol tartrate (LOPRESSOR) 25 MG tablet Take 1 tablet by mouth 2 times daily 60 tablet 3    amiodarone (CORDARONE) 200 MG tablet Take 1 tablet by mouth daily 30 tablet 0    aspirin EC 81 MG EC tablet Take 1 tablet by mouth daily 90 tablet 1    pantoprazole sodium (PROTONIX) 40 MG PACK packet Take 40 mg by mouth every morning (before breakfast)      pregabalin (LYRICA) 50 MG capsule Take 100 mg by mouth 3 times daily.       DULoxetine (CYMBALTA) 30 MG extended release capsule Take 60 mg by mouth daily       oxybutynin (DITROPAN) 5 MG tablet Take 5 mg by mouth nightly      tamsulosin (FLOMAX) 0.4 MG capsule Take 0.4 mg by mouth daily       No current facility-administered medications for this visit. Social History     Socioeconomic History    Marital status: Life Partner     Spouse name: Wilma Marie    Number of children: None    Years of education: None    Highest education level: None   Tobacco Use    Smoking status: Former     Packs/day: 5.00     Years: 15.00     Pack years: 75.00     Types: Cigarettes     Quit date: 2000     Years since quittin.8    Smokeless tobacco: Never   Vaping Use    Vaping Use: Never used   Substance and Sexual Activity    Alcohol use: Yes     Comment: couple beers a night    Drug use: No    Sexual activity: Yes     Partners: Female       Family History   Problem Relation Age of Onset    Heart Disease Mother 62        CABG    High Cholesterol Mother     Cancer Mother         Breast Cancer, Uterine    Cancer Father         lung cancer    High Cholesterol Father     Heart Disease Father 61        stents    Cancer Brother         lung    Asthma Brother          I reviewed the past medical history, allergies, medications, social history and family history. Review of Systems   All systems reviewed, and are all negative, except what is mentioned in HPI      Vitals:    22 1028   BP: 130/85   Site: Left Upper Arm   Position: Sitting   Cuff Size: Large Adult   Pulse: 70   SpO2: 96%   Weight: 222 lb (100.7 kg)   Height: 5' 6\" (1.676 m)       Physical Examination:  General appearance - alert, well appearing, and in no distress, oriented to person, place, and time and over weight  Mental status- Level of Alertness: awake  Orientation: person, place, time  Memory: normal  Fund of Knowledge: normal  Attention/Concentration: normal  Language: normal. Mood is normal.   Neck - supple, no significant adenopathy, carotids upstroke normal bilaterally. There is no axillary lymphadenopathy. There is no carotid bruit. No neck lymphadenopathy.  No thyroid enlargement   Neurological -   Cranial Qpypfr-AT-YHR:.   Cranial nerve II: Normal Cranial nerve III: Pupils: equal, round, reactive to light  Cranial nerves III, IV, VI: Extraocular Movements: intact   Cranial nerve V: Facial sensation: intact   Cranial nerve VII:Facial strength: intact   Cranial nerve VIII: Hearing: intact    Cranial nerve IX: Palate Elevation intact bilaterally  Cranial nerve XI: Shoulder shrug intact bilaterally  Cranial nerve XII: Tongue midline   neck supple without rigidity, there is  limitation of range of motion of the neck. DTR's are decreased distal and symmetric  Babinski sign negative  Motor exam is 5/5 in the upper and lower extremities. Normal muscle tone. There is no muscle atrophy. Sensory is intact for light touch. Coordination: finger to nose, intact  Gait and station guarded, uses cane  Abnormal movement there is action and resting tremor noted in bilateral hands, vibration reduced distally  Skin - warm, dry to touch, normal coloration, no rashes, no suspicious skin lesions  Superficial temporal artery pulses are normal.   There is no limitation of range of motion of the neck. There is no resting tremor, no pin rolling, no bradykinesia, no Hypohonia, normal blink rate. Musculoskeletal: Has no hand arthritis, no limitation of ROM in any of the four extremities. There is no leg edema. The Heart was regular in rate and rhythm. No heart murmur  Chest Clear, with  good effort. Abdomen soft, intact bowel sounds. Results for orders placed during the hospital encounter of 11/21/22    MRI BRAIN W WO CONTRAST    Narrative  Brain MRI without and with IV contrast    Comparison: CT/SR - CT HEAD WO CONTRAST - 11/21/2022 04:57 AM EST . Findings:    Sulcation is age-appropriate. Ventricle size is normal. Minimal  periventricular white matter ischemic change. Gray-white differentiation is intact throughout. Negative for acute infarct or bleed. No evidence of mass lesion. Brainstem and cerebellum are unremarkable.   Intact corpus callosum, pituitary Resulted gland and fossa. 7th - 8th nerve complexes are symmetric, without mass. Normal flow voids within the carotid siphons and the vertebral basilar  arteries. Unremarkable orbits. Mild chronic mucosal thickening of the ethmoid, sphenoid, and maxillary  sinuses. Clear mastoid air cells. No calvarial or scalp lesion. Impression  Impression:  1. Age-related intracranial exam without acute abnormality, or mass  lesion. 2. Chronic sinusitis. This document has been electronically signed by: Funmilayo Wynn MD on  11/22/2022 10:00 PM    No results found for this or any previous visit. Results for orders placed during the hospital encounter of 11/21/22    CT HEAD WO CONTRAST    Narrative  CT head without contrast    Comparison: None    Findings:  No intracranial mass, midline shift, hydrocephalus, or acute hemorrhage. Minimal changes of chronic microvascular ischemic disease and age-related  global volume loss. Minimal mucosal thickening of the bilateral maxillary sinuses and ethmoid  sinuses. The orbits are within normal limits. No acute fracture. Impression  1. No acute intracranial process. 2. Minimal changes of chronic microvascular ischemic disease and  age-related global volume loss. This document has been electronically signed by: Melisa Juarez DO on  11/21/2022 05:58 AM    All CTs at this facility use dose modulation techniques and iterative  reconstructions, and/or weight-based dosing  when appropriate to reduce radiation to a low as reasonably achievable. EEG done 11/21/22:  Impression  Normal awake and sleep EEG. EKG lead did not show clear arrhythmia, if still in concern consider formal EKG or correlation with telemetry. No epileptiform discharges were identified. Please note the absence of such activity on this record cannot conclusively rule out an epileptic disorder.  If such is still clinically suspected, a repeat study with sleep deprivation and/or prolonged sampling may be helpful. Ese Velasquez MD  Epilepsy Board Certified. Neurology Board Certified. Electronically Signed      We reviewed the patient records from referring provider and available information in the EHR       ASSESSMENT:      Diagnosis Orders   1. Seizure-like activity ([address]  Cardiac event monitor    EEG      2. Syncope and collapse  Cardiac event monitor    EEG      3. Tremor           This is a 46-year-old male who presents with history of seizure-like activity that began in November 2022. MRI brain done W/WO contrast 11/22/22 showed no acute findings. EEG done 11/21/22 was normal. I reviewed the MRI Brain and agree with interpretation, I also reviewed the patient pertinent labs and records in the EHR and from other providers. Of note, he had previous history of  PCI and CABG earlier in November. He was started on keppra, however he did not tolerate this due to agitation  and was subsequently started on vimpat. He denies any previous history of seizure in the past. He does have previous history of extensive cardiac history including a-fib with RVR,  hypotension, and bradycardia. He was also found to have Covid infection. On exam, he has mixed action and resting tremor noted in his bilateral hands. The patient was counseled about his symptoms and work up results, he was also counseled about medication options and side effects. The patient and his wife were counseled that it is difficult to determine cause of tremor due to prolonged hospitalization. It could be related to hypoxia . We will continue him on Vimpat 100 mg twice a day and start him on primidone 50 mg nightly to address the tremor. He was counseled on the importance of refraining from driving, swimming, operating heavy machinery, or compromising heights until event free for 6 months. Detail discussion with the patient and his wife we agreed on the following plan.        Plan    Continue with Vimpat 100 mg two times a day. Refills given  Start Primidone 50 mg nightly  No driving, swimming, operating heavy machinery or compromising heights until event free for 6 months. Report any new events. Call if any questions. Continue following with cardiology  Call with any new symptoms or concerns. Follow up in 2 months.      Total time 65 min    Kyle Alvarez MD

## 2022-12-21 PROBLEM — R79.89 ELEVATED TROPONIN: Status: RESOLVED | Noted: 2022-11-21 | Resolved: 2022-12-21

## 2022-12-21 PROBLEM — R77.8 ELEVATED TROPONIN: Status: RESOLVED | Noted: 2022-11-21 | Resolved: 2022-12-21

## 2022-12-22 ENCOUNTER — OFFICE VISIT (OUTPATIENT)
Dept: CARDIOLOGY CLINIC | Age: 77
End: 2022-12-22
Payer: MEDICARE

## 2022-12-22 VITALS
DIASTOLIC BLOOD PRESSURE: 95 MMHG | WEIGHT: 228.8 LBS | BODY MASS INDEX: 36.77 KG/M2 | HEART RATE: 80 BPM | SYSTOLIC BLOOD PRESSURE: 153 MMHG | HEIGHT: 66 IN

## 2022-12-22 DIAGNOSIS — I10 PRIMARY HYPERTENSION: ICD-10-CM

## 2022-12-22 DIAGNOSIS — E78.2 MIXED HYPERLIPIDEMIA: ICD-10-CM

## 2022-12-22 DIAGNOSIS — Z95.1 S/P CABG X 2: ICD-10-CM

## 2022-12-22 DIAGNOSIS — I25.10 CAD IN NATIVE ARTERY: Primary | ICD-10-CM

## 2022-12-22 PROCEDURE — 99214 OFFICE O/P EST MOD 30 MIN: CPT | Performed by: STUDENT IN AN ORGANIZED HEALTH CARE EDUCATION/TRAINING PROGRAM

## 2022-12-22 PROCEDURE — 1123F ACP DISCUSS/DSCN MKR DOCD: CPT | Performed by: STUDENT IN AN ORGANIZED HEALTH CARE EDUCATION/TRAINING PROGRAM

## 2022-12-22 PROCEDURE — 3074F SYST BP LT 130 MM HG: CPT | Performed by: STUDENT IN AN ORGANIZED HEALTH CARE EDUCATION/TRAINING PROGRAM

## 2022-12-22 PROCEDURE — 3078F DIAST BP <80 MM HG: CPT | Performed by: STUDENT IN AN ORGANIZED HEALTH CARE EDUCATION/TRAINING PROGRAM

## 2022-12-22 RX ORDER — POTASSIUM CHLORIDE 20 MEQ/1
20 TABLET, EXTENDED RELEASE ORAL 2 TIMES DAILY
Qty: 180 TABLET | Refills: 1 | Status: SHIPPED | OUTPATIENT
Start: 2022-12-22

## 2022-12-22 RX ORDER — IRBESARTAN AND HYDROCHLOROTHIAZIDE 150; 12.5 MG/1; MG/1
1 TABLET, FILM COATED ORAL DAILY
Qty: 90 TABLET | Refills: 1 | Status: SHIPPED | OUTPATIENT
Start: 2022-12-22

## 2022-12-22 NOTE — PROGRESS NOTES
[facility] PROFESSIONAL SERVICES  [facility]   [address]   [address] [address]   Dept: [phone]   Dept Fax: [phone]   Loc: [phone]      Chief Complaint   Patient presents with    Follow-up       Cardiologist:  Dr. Abeba Corrales  79 yo male presents for f/u of emergency CABG x 2. Had [facility] with wire break requiring CABG. Hx of CAD and PCI, HTN, HLD, COPD, MARTA, now seizures. Tremors, seizures since the CABG. This new onset for him. Did not have this problem before. Has been on anti-sizures meds and not had any issues since then. Still with resting tremor. Difficult to write now. Still with bilateral chest pain across the chest, none at the sternal incision. Significnat SOB. Was able to finish rehab session recently. Will continue with this. General:   No fever, no chills, no weight loss, + fatigue  Pulmonary:    + dyspnea, no wheezing  Cardiac:    Denies recent chest pain, bilat chest discomfort   GI:     No nausea or vomiting, no abdominal pain  Neuro:      No dizziness or light headedness, + seizures, + tremor  Musculoskeletal:  No recent active issues  Extremities:   No edema      Past Medical History:   Diagnosis Date    Anxiety     Bradycardia 4/8/2016    Heart disease     Hyperlipidemia     Hypertension     Medtronic linq loop recorder 4/8/2016    Neuropathy     Paroxysmal atrial fibrillation (HCC) 9/23/2015    Spondylolisthesis     Syncopal episodes 9/23/2015       Allergies   Allergen Reactions    Adhesive Tape Rash       Current Outpatient Medications   Medication Sig Dispense Refill    tiZANidine (ZANAFLEX) 4 MG tablet Take 4 mg by mouth every 6 hours as needed      metroNIDAZOLE (METROGEL) 1 % gel Apply topically daily Apply topically daily. primidone (MYSOLINE) 50 MG tablet Take 1 tablet by mouth nightly 30 tablet 3    lacosamide (VIMPAT) 100 MG TABS tablet Take 1 tablet by mouth 2 times daily for 60 days.  60 tablet 1    clopidogrel (PLAVIX) 75 MG tablet Take 1 tablet by mouth daily 90 tablet 3    nitroGLYCERIN (NITROSTAT) 0.4 MG SL tablet Place 1 tablet under the tongue every 5 minutes as needed for Chest pain up to max of 3 total doses. If no relief after 1 dose, call 911. 25 tablet 3    metoprolol tartrate (LOPRESSOR) 25 MG tablet Take 1 tablet by mouth 2 times daily 180 tablet 3    pravastatin (PRAVACHOL) 80 MG tablet Take 1 tablet by mouth once daily 90 tablet 0    vitamin B-12 1000 MCG tablet Take 1 tablet by mouth daily 30 tablet 0    Cholecalciferol (VITAMIN D) 25 MCG TABS Take 1 tablet by mouth daily 30 tablet 0    aspirin EC 81 MG EC tablet Take 1 tablet by mouth daily 90 tablet 1    pantoprazole sodium (PROTONIX) 40 MG PACK packet Take 40 mg by mouth every morning (before breakfast)      pregabalin (LYRICA) 50 MG capsule Take 100 mg by mouth 3 times daily. DULoxetine (CYMBALTA) 30 MG extended release capsule Take 60 mg by mouth daily       oxybutynin (DITROPAN) 5 MG tablet Take 5 mg by mouth nightly      tamsulosin (FLOMAX) 0.4 MG capsule Take 0.4 mg by mouth daily       No current facility-administered medications for this visit.        Social History     Socioeconomic History    Marital status: Life Partner     Spouse name: Marcos Gong    Number of children: None    Years of education: None    Highest education level: None   Tobacco Use    Smoking status: Former     Packs/day: 5.00     Years: 15.00     Pack years: 75.00     Types: Cigarettes     Quit date: 2000     Years since quittin.9    Smokeless tobacco: Never   Vaping Use    Vaping Use: Never used   Substance and Sexual Activity    Alcohol use: Yes     Comment: couple beers a night    Drug use: No    Sexual activity: Yes     Partners: Female       Family History   Problem Relation Age of Onset    Heart Disease Mother 62        CABG    High Cholesterol Mother     Cancer Mother         Breast Cancer, Uterine    Cancer Father         lung cancer    High Cholesterol Father     Heart Disease Father 61        stents    Cancer Brother         lung    Asthma Brother        Blood pressure (!) 153/95, pulse 80, height 5' 6\" (1.676 m), weight 228 lb 12.8 oz (103.8 kg). General:   Well developed, well nourished  Lungs:    Clear to auscultation, no rales  Heart:    RRR, Normal S1 S2, No murmur, rubs, or gallops  Abdomen:   Soft, non tender, no organomegalies, positive bowel sounds  Extremities:   No edema, no cyanosis, good peripheral pulses  Neurological:   Awake, alert, oriented. No obvious focal deficits  Musculoskeletal:  No obvious deformities    EKG:          Diagnosis Orders   1. CAD in native artery        2. S/P CABG x 2        3. Primary hypertension        4. Mixed hyperlipidemia            No orders of the defined types were placed in this encounter. Assessment/Plan:   CAD s/p CABG and PCI- continue GDMT as tolerated. HTN-higher now. Off irbesartan, was not restarted. Will restart at 150-12.5 mg hctz and continue metoprolol. Notices palpitations occasionally. Asa, plavix, nitro prn. Restart Kcl as well for lower K. HLD-on pravastatin.      Disposition:   Follow up with Dr Corina Henry as scheduled or sooner if needed

## 2022-12-26 LAB
FUNGUS IDENTIFIED: NORMAL
FUNGUS SMEAR: NORMAL

## 2022-12-27 DIAGNOSIS — R56.9 SEIZURE-LIKE ACTIVITY (HCC): ICD-10-CM

## 2022-12-27 DIAGNOSIS — R25.1 TREMOR: Primary | ICD-10-CM

## 2022-12-27 RX ORDER — PRIMIDONE 50 MG/1
50 TABLET ORAL NIGHTLY
Qty: 90 TABLET | Refills: 1 | Status: SHIPPED | OUTPATIENT
Start: 2022-12-27

## 2023-01-09 LAB — AFB CULTURE & SMEAR: NORMAL

## 2023-01-30 ENCOUNTER — OFFICE VISIT (OUTPATIENT)
Dept: NEUROLOGY | Age: 78
End: 2023-01-30

## 2023-01-30 VITALS
HEIGHT: 66 IN | SYSTOLIC BLOOD PRESSURE: 130 MMHG | HEART RATE: 76 BPM | DIASTOLIC BLOOD PRESSURE: 80 MMHG | WEIGHT: 227 LBS | OXYGEN SATURATION: 97 % | BODY MASS INDEX: 36.48 KG/M2

## 2023-01-30 DIAGNOSIS — R25.9 MIXED ACTION AND RESTING TREMOR: ICD-10-CM

## 2023-01-30 DIAGNOSIS — R55 SYNCOPE AND COLLAPSE: ICD-10-CM

## 2023-01-30 DIAGNOSIS — R56.9 SEIZURE-LIKE ACTIVITY (HCC): Primary | ICD-10-CM

## 2023-01-30 RX ORDER — ROPINIROLE 0.5 MG/1
TABLET, FILM COATED ORAL
Qty: 90 TABLET | Refills: 2 | Status: SHIPPED | OUTPATIENT
Start: 2023-01-30

## 2023-01-30 NOTE — PROGRESS NOTES
NEUROLOGY OUT PATIENT FOLLOW UP NOTE:  1/30/202312:26 PM    Amy Sandoval is here for follow up for   Patient Active Problem List   Diagnosis    Hypertension    Hyperlipidemia    Anxiety    Abnormal findings on cardiac catheterization    Spondylolisthesis    CAD (coronary artery disease) cath 06/2011 40 to 50% stenosis in mid LAD, Patent diagonal stent,40 to 50% Mid Lcx stenosis,patent RCA stent EF 55%    GERD (gastroesophageal reflux disease)    Syncopal episodes    Paroxysmal atrial fibrillation (HCC)    Medtronic linq loop recorder    Bradycardia    Angina of effort ([address]    Status post angioplasty with stent    S/P CABG x 2    CAD in native artery    COVID    Syncope, unspecified syncope type    Seizure (HCC)    Abnormal EKG    . Follow up for tremor, seizure like activity. He is here to go over plan. Allergies   Allergen Reactions    Adhesive Tape Rash       Current Outpatient Medications:     primidone (MYSOLINE) 50 MG tablet, Take 1 tablet by mouth nightly (Patient taking differently: Take 50 mg by mouth in the morning and at bedtime), Disp: 90 tablet, Rfl: 1    irbesartan-hydroCHLOROthiazide (AVALIDE) 150-12.5 MG per tablet, Take 1 tablet by mouth daily, Disp: 90 tablet, Rfl: 1    potassium chloride (KLOR-CON M) 20 MEQ extended release tablet, Take 1 tablet by mouth 2 times daily, Disp: 180 tablet, Rfl: 1    tiZANidine (ZANAFLEX) 4 MG tablet, Take 4 mg by mouth every 6 hours as needed, Disp: , Rfl:     metroNIDAZOLE (METROGEL) 1 % gel, Apply topically daily Apply topically daily. , Disp: , Rfl:     lacosamide (VIMPAT) 100 MG TABS tablet, Take 1 tablet by mouth 2 times daily for 60 days. , Disp: 60 tablet, Rfl: 1    clopidogrel (PLAVIX) 75 MG tablet, Take 1 tablet by mouth daily, Disp: 90 tablet, Rfl: 3    nitroGLYCERIN (NITROSTAT) 0.4 MG SL tablet, Place 1 tablet under the tongue every 5 minutes as needed for Chest pain up to max of 3 total doses.  If no relief after 1 dose, call 911., Disp: 25 tablet, Rfl: 3    metoprolol tartrate (LOPRESSOR) 25 MG tablet, Take 1 tablet by mouth 2 times daily, Disp: 180 tablet, Rfl: 3    pravastatin (PRAVACHOL) 80 MG tablet, Take 1 tablet by mouth once daily, Disp: 90 tablet, Rfl: 0    vitamin B-12 1000 MCG tablet, Take 1 tablet by mouth daily, Disp: 30 tablet, Rfl: 0    Cholecalciferol (VITAMIN D) 25 MCG TABS, Take 1 tablet by mouth daily, Disp: 30 tablet, Rfl: 0    aspirin EC 81 MG EC tablet, Take 1 tablet by mouth daily, Disp: 90 tablet, Rfl: 1    pantoprazole sodium (PROTONIX) 40 MG PACK packet, Take 40 mg by mouth every morning (before breakfast), Disp: , Rfl:     pregabalin (LYRICA) 50 MG capsule, Take 100 mg by mouth 3 times daily. , Disp: , Rfl:     DULoxetine (CYMBALTA) 30 MG extended release capsule, Take 60 mg by mouth daily , Disp: , Rfl:     oxybutynin (DITROPAN) 5 MG tablet, Take 5 mg by mouth nightly, Disp: , Rfl:     tamsulosin (FLOMAX) 0.4 MG capsule, Take 0.4 mg by mouth daily, Disp: , Rfl:     I reviewed the past medical history, allergies, medications, social history and family history. PE:   Vitals:    01/30/23 1203   BP: 130/80   Site: Left Upper Arm   Position: Sitting   Cuff Size: Large Adult   Pulse: 76   SpO2: 97%   Weight: 227 lb (103 kg)   Height: 5' 6\" (1.676 m)     General Appearance:  alert and cooperative  Gen: NAD, Language is Intact. Skin: no rash, lesion,  moist to touch. warm  Head: no rash, no icterus  Neck: There is no carotid bruits. The Neck is supple. There is no neck lymphadenopathy. Neuro: CN 2-12 grossly intact with no focal deficits. neck supple without rigidity, there is  limitation of range of motion of the neck. DTR's are decreased distal and symmetric  Babinski sign negative  Motor exam is 5/5 in the upper and lower extremities. Normal muscle tone. There is no muscle atrophy. Sensory is intact for light touch.    Coordination: finger to nose, intact  Gait and station guarded, uses cane  Abnormal movement there is action tremor, symmetric, left resting tremor noted, vibration reduced distally  Skin - warm, dry to touch, normal coloration, no rashes, no suspicious skin lesions  Superficial temporal artery pulses are normal.   There is no limitation of range of motion of the neck. Musculoskeletal:  Has no hand arthritis, no limitation of ROM in any of the four extremities. Lower extremities no edema        DATA:      Results for orders placed or performed during the hospital encounter of 38/75/27   Basic Metabolic Panel   Result Value Ref Range    Sodium 142 135 - 145 meq/L    Potassium 4.0 3.5 - 5.2 meq/L    Chloride 106 98 - 111 meq/L    CO2 24 23 - 33 meq/L    Glucose 124 (H) 70 - 108 mg/dL    BUN 17 7 - 22 mg/dL    Creatinine 1.1 0.4 - 1.2 mg/dL    Calcium 9.4 8.5 - 10.5 mg/dL   CBC   Result Value Ref Range    WBC 9.1 4.8 - 10.8 thou/mm3    RBC 3.69 (L) 4.70 - 6.10 mill/mm3    Hemoglobin 11.7 (L) 14.0 - 18.0 gm/dl    Hematocrit 36.3 (L) 42.0 - 52.0 %    MCV 98.4 (H) 80.0 - 94.0 fL    MCH 31.7 26.0 - 33.0 pg    MCHC 32.2 32.2 - 35.5 gm/dl    RDW-CV 15.1 (H) 11.5 - 14.5 %    RDW-SD 53.7 (H) 35.0 - 45.0 fL    Platelets 546 (H) 852 - 400 thou/mm3    MPV 9.7 9.4 - 12.4 fL   Glomerular Filtration Rate, Estimated   Result Value Ref Range    Est, Glom Filt Rate >60 >60 ml/min/1.73m2   Anion Gap   Result Value Ref Range    Anion Gap 12.0 8.0 - 16.0 meq/L            MRI BRAIN W WO CONTRAST    Narrative  Brain MRI without and with IV contrast    Comparison: CT/SR - CT HEAD WO CONTRAST - 11/21/2022 04:57 AM EST . Findings:    Sulcation is age-appropriate. Ventricle size is normal. Minimal  periventricular white matter ischemic change. Gray-white differentiation is intact throughout. Negative for acute infarct or bleed. No evidence of mass lesion. Brainstem and cerebellum are unremarkable. Intact corpus callosum, pituitary gland and fossa. 7th - 8th nerve complexes are symmetric, without mass.   Normal flow voids within the carotid siphons and the vertebral basilar  arteries. Unremarkable orbits. Mild chronic mucosal thickening of the ethmoid, sphenoid, and maxillary  sinuses. Clear mastoid air cells. No calvarial or scalp lesion. Impression  Impression:  1. Age-related intracranial exam without acute abnormality, or mass  lesion. 2. Chronic sinusitis. This document has been electronically signed by: Eloina Trujillo MD on  11/22/2022 10:00 PM    No results found for this or any previous visit. Results for orders placed during the hospital encounter of 11/21/22    CT HEAD WO CONTRAST    Narrative  CT head without contrast    Comparison: None    Findings:  No intracranial mass, midline shift, hydrocephalus, or acute hemorrhage. Minimal changes of chronic microvascular ischemic disease and age-related  global volume loss. Minimal mucosal thickening of the bilateral maxillary sinuses and ethmoid  sinuses. The orbits are within normal limits. No acute fracture. Impression  1. No acute intracranial process. 2. Minimal changes of chronic microvascular ischemic disease and  age-related global volume loss. This document has been electronically signed by: Janelle Boone DO on  11/21/2022 05:58 AM    All CTs at this facility use dose modulation techniques and iterative  reconstructions, and/or weight-based dosing  when appropriate to reduce radiation to a low as reasonably achievable. Assessment:     Diagnosis Orders   1. Seizure-like activity ([address]        2. Mixed action and resting tremor        3. Syncope and collapse             Follow up for seizure like activity, mixed tremor. Patient was seen by cardiology, his blood pressure seems to be higher he was, He continues to be on aspirin Plavix and nitro.he has mixed tremor. He was increased on Primidone 50 mg twice a day. The patient was counseled, up recommended, he was also counseled about medication options and side effects.   He struggles with using the pen, and fine motor functions. After detailed discussion with patient we agreed on the following plan. Plan:  Start Requip 0.25 mg oral three times a day for 10 days then 0.5 mg three times a day thereafter. Vimpat 100 mg two times a day. Refills given  Primidone 50 mg twice a day. No driving, swimming, operating heavy machinery or compromising heights until event free for 6 months. Report any new events. Call if any questions. Continue following with cardiology  Call with any new symptoms or concerns. Follow up in 2 months.        Total time 33 min    Caleb Alvarez MD

## 2023-01-30 NOTE — PATIENT INSTRUCTIONS
Start Requip 0.25 mg oral three times a day for 10 days then 0.5 mg three times a day thereafter. Vimpat 100 mg two times a day. Refills given  Primidone 50 mg twice a day. No driving, swimming, operating heavy machinery or compromising heights until event free for 6 months. Report any new events. Call if any questions. Continue following with cardiology  Call with any new symptoms or concerns. Follow up in 2 months. PROVIDER:[TOKEN:[[id number]]]

## 2023-02-23 DIAGNOSIS — R56.9 SEIZURE (HCC): ICD-10-CM

## 2023-02-23 RX ORDER — LACOSAMIDE 100 MG/1
TABLET ORAL
Qty: 60 TABLET | Refills: 0 | OUTPATIENT
Start: 2023-02-23

## 2023-02-23 RX ORDER — LACOSAMIDE 100 MG/1
100 TABLET ORAL 2 TIMES DAILY
Qty: 60 TABLET | Refills: 3 | Status: SHIPPED | OUTPATIENT
Start: 2023-02-23 | End: 2023-04-24

## 2023-03-14 ENCOUNTER — OFFICE VISIT (OUTPATIENT)
Dept: CARDIOLOGY CLINIC | Age: 78
End: 2023-03-14
Payer: MEDICARE

## 2023-03-14 VITALS
HEART RATE: 65 BPM | BODY MASS INDEX: 37.19 KG/M2 | WEIGHT: 231.4 LBS | SYSTOLIC BLOOD PRESSURE: 131 MMHG | DIASTOLIC BLOOD PRESSURE: 69 MMHG | HEIGHT: 66 IN

## 2023-03-14 DIAGNOSIS — I48.0 PAROXYSMAL ATRIAL FIBRILLATION (HCC): Primary | ICD-10-CM

## 2023-03-14 DIAGNOSIS — R42 DIZZINESS: ICD-10-CM

## 2023-03-14 DIAGNOSIS — E78.5 HYPERLIPIDEMIA, UNSPECIFIED HYPERLIPIDEMIA TYPE: ICD-10-CM

## 2023-03-14 DIAGNOSIS — I10 PRIMARY HYPERTENSION: ICD-10-CM

## 2023-03-14 DIAGNOSIS — I25.10 CAD IN NATIVE ARTERY: ICD-10-CM

## 2023-03-14 DIAGNOSIS — Z95.820 STATUS POST ANGIOPLASTY WITH STENT: ICD-10-CM

## 2023-03-14 DIAGNOSIS — Z95.1 S/P CABG X 2: ICD-10-CM

## 2023-03-14 DIAGNOSIS — R56.9 SEIZURE (HCC): ICD-10-CM

## 2023-03-14 PROCEDURE — 99213 OFFICE O/P EST LOW 20 MIN: CPT | Performed by: NURSE PRACTITIONER

## 2023-03-14 PROCEDURE — 3078F DIAST BP <80 MM HG: CPT | Performed by: NURSE PRACTITIONER

## 2023-03-14 PROCEDURE — 3074F SYST BP LT 130 MM HG: CPT | Performed by: NURSE PRACTITIONER

## 2023-03-14 PROCEDURE — 1123F ACP DISCUSS/DSCN MKR DOCD: CPT | Performed by: NURSE PRACTITIONER

## 2023-03-14 RX ORDER — BETHANECHOL CHLORIDE 25 MG/1
25 TABLET ORAL 3 TIMES DAILY
COMMUNITY
Start: 2023-02-28

## 2023-03-14 NOTE — PROGRESS NOTES
fatigue  Pulmonary:    + dyspnea, no wheezing  Cardiac:    Denies recent chest pain, bilat MSK chest discomfort   GI:     No nausea or vomiting, no abdominal pain  Neuro:      No dizziness; some light headedness, + seizures, + tremor  Musculoskeletal:  No recent active issues  Extremities:   No edema      Past Medical History:   Diagnosis Date    Anxiety     Bradycardia 4/8/2016    Heart disease     Hyperlipidemia     Hypertension     Medtronic linq loop recorder 4/8/2016    Neuropathy     Paroxysmal atrial fibrillation (HCC) 9/23/2015    Spondylolisthesis     Syncopal episodes 9/23/2015       Allergies   Allergen Reactions    Adhesive Tape Rash       Current Outpatient Medications   Medication Sig Dispense Refill    bethanechol (URECHOLINE) 25 MG tablet Take 25 mg by mouth 3 times daily      lacosamide (VIMPAT) 100 MG TABS tablet Take 1 tablet by mouth 2 times daily for 60 days. 60 tablet 3    rOPINIRole (REQUIP) 0.5 MG tablet Take Requip 0.25 mg oral three times a day for 7 days then 0.5 mg three times a day thereafter. 90 tablet 2    primidone (MYSOLINE) 50 MG tablet Take 1 tablet by mouth nightly (Patient taking differently: Take 50 mg by mouth in the morning and at bedtime) 90 tablet 1    irbesartan-hydroCHLOROthiazide (AVALIDE) 150-12.5 MG per tablet Take 1 tablet by mouth daily 90 tablet 1    potassium chloride (KLOR-CON M) 20 MEQ extended release tablet Take 1 tablet by mouth 2 times daily 180 tablet 1    metroNIDAZOLE (METROGEL) 1 % gel Apply topically daily Apply topically daily. clopidogrel (PLAVIX) 75 MG tablet Take 1 tablet by mouth daily 90 tablet 3    nitroGLYCERIN (NITROSTAT) 0.4 MG SL tablet Place 1 tablet under the tongue every 5 minutes as needed for Chest pain up to max of 3 total doses.  If no relief after 1 dose, call 911. 25 tablet 3    metoprolol tartrate (LOPRESSOR) 25 MG tablet Take 1 tablet by mouth 2 times daily 180 tablet 3    pravastatin (PRAVACHOL) 80 MG tablet Take 1 tablet by

## 2023-03-14 NOTE — PATIENT INSTRUCTIONS
Continue current medications as prescribed. Continue Cardiac rehab and then stay active. Eat heart healthy diet. Follow-up with your PCP as scheduled. Follow-up with Dr. Rick Toussaint in 3 months as scheduled or sooner if need. Wear the event monitor as prescribed to check for underlying arrhythmia. Take the blood pressure twice a day and keep a log. Call the office with an update in 2 weeks.

## 2023-03-24 ENCOUNTER — HOSPITAL ENCOUNTER (OUTPATIENT)
Dept: NON INVASIVE DIAGNOSTICS | Age: 78
Discharge: HOME | End: 2023-03-24
Payer: MEDICARE

## 2023-03-24 DIAGNOSIS — R42 DIZZINESS: ICD-10-CM

## 2023-03-24 DIAGNOSIS — I48.0 PAROXYSMAL ATRIAL FIBRILLATION (HCC): ICD-10-CM

## 2023-03-24 PROCEDURE — 93246 EXT ECG>7D<15D RECORDING: CPT

## 2023-03-24 NOTE — PROCEDURES
The skin was prepped and a 14 day epatch monitor was applied. The patient was instructed on the documentation of symptoms and the purpose of the monitor as well as the things to avoid while wearing the monitor. The patient was instructed to remove and return the monitor on 04/07/2023.   The serial number of the monitor that was applied is [id number]

## 2023-03-31 ENCOUNTER — TELEPHONE (OUTPATIENT)
Dept: CARDIOLOGY CLINIC | Age: 78
End: 2023-03-31

## 2023-03-31 NOTE — TELEPHONE ENCOUNTER
Wife called patient having diarrhea and vomiting for 3 days, fever 99.2, /103, HR 78. Recommended to take patient to ER. Wife voiced understanding.

## 2023-04-20 ENCOUNTER — TELEPHONE (OUTPATIENT)
Dept: CARDIOLOGY CLINIC | Age: 78
End: 2023-04-20

## 2023-04-20 DIAGNOSIS — R25.1 TREMOR: ICD-10-CM

## 2023-04-20 RX ORDER — PRIMIDONE 50 MG/1
50 TABLET ORAL 2 TIMES DAILY
Qty: 180 TABLET | Refills: 1 | Status: SHIPPED | OUTPATIENT
Start: 2023-04-20

## 2023-04-20 NOTE — TELEPHONE ENCOUNTER
Please see Event Monitor results-no [address] [facility] on board. Next appt-6/22/23      CONCLUSION:  1. Abnormal event monitor with several episodes of atrial fibrillation,  rapid ventricular response that needs further attention and management. 2.  Occasional PVCs. 3.  Clinical correlation and further medical evaluation and treatment is  recommended accordingly.      Israel Carias M.D.

## 2023-04-20 NOTE — TELEPHONE ENCOUNTER
Please approve or deny     Last Visit Date:  1/30/2023   Pritesh Parker    Next Visit Date:    4/24/2023

## 2023-04-24 ENCOUNTER — OFFICE VISIT (OUTPATIENT)
Dept: CARDIOLOGY CLINIC | Age: 78
End: 2023-04-24
Payer: MEDICARE

## 2023-04-24 ENCOUNTER — OFFICE VISIT (OUTPATIENT)
Dept: NEUROLOGY | Age: 78
End: 2023-04-24
Payer: MEDICARE

## 2023-04-24 VITALS
HEART RATE: 80 BPM | OXYGEN SATURATION: 95 % | WEIGHT: 231 LBS | BODY MASS INDEX: 37.12 KG/M2 | SYSTOLIC BLOOD PRESSURE: 130 MMHG | HEIGHT: 66 IN | DIASTOLIC BLOOD PRESSURE: 75 MMHG

## 2023-04-24 VITALS
BODY MASS INDEX: 36.9 KG/M2 | SYSTOLIC BLOOD PRESSURE: 152 MMHG | HEIGHT: 66 IN | WEIGHT: 229.6 LBS | DIASTOLIC BLOOD PRESSURE: 84 MMHG | HEART RATE: 62 BPM

## 2023-04-24 DIAGNOSIS — R25.9 MIXED ACTION AND RESTING TREMOR: ICD-10-CM

## 2023-04-24 DIAGNOSIS — R55 SYNCOPE AND COLLAPSE: Primary | ICD-10-CM

## 2023-04-24 DIAGNOSIS — I48.0 PAROXYSMAL ATRIAL FIBRILLATION (HCC): ICD-10-CM

## 2023-04-24 DIAGNOSIS — E78.01 FAMILIAL HYPERCHOLESTEROLEMIA: ICD-10-CM

## 2023-04-24 DIAGNOSIS — I25.810 CORONARY ARTERY DISEASE INVOLVING CORONARY BYPASS GRAFT OF NATIVE HEART WITHOUT ANGINA PECTORIS: Primary | ICD-10-CM

## 2023-04-24 DIAGNOSIS — R56.9 SEIZURE (HCC): ICD-10-CM

## 2023-04-24 DIAGNOSIS — I10 PRIMARY HYPERTENSION: ICD-10-CM

## 2023-04-24 PROCEDURE — 3078F DIAST BP <80 MM HG: CPT | Performed by: PSYCHIATRY & NEUROLOGY

## 2023-04-24 PROCEDURE — 99214 OFFICE O/P EST MOD 30 MIN: CPT | Performed by: PSYCHIATRY & NEUROLOGY

## 2023-04-24 PROCEDURE — 3079F DIAST BP 80-89 MM HG: CPT | Performed by: NUCLEAR MEDICINE

## 2023-04-24 PROCEDURE — 1123F ACP DISCUSS/DSCN MKR DOCD: CPT | Performed by: NUCLEAR MEDICINE

## 2023-04-24 PROCEDURE — 3077F SYST BP >= 140 MM HG: CPT | Performed by: NUCLEAR MEDICINE

## 2023-04-24 PROCEDURE — 1123F ACP DISCUSS/DSCN MKR DOCD: CPT | Performed by: PSYCHIATRY & NEUROLOGY

## 2023-04-24 PROCEDURE — 99214 OFFICE O/P EST MOD 30 MIN: CPT | Performed by: NUCLEAR MEDICINE

## 2023-04-24 PROCEDURE — 3074F SYST BP LT 130 MM HG: CPT | Performed by: PSYCHIATRY & NEUROLOGY

## 2023-04-24 RX ORDER — ZONISAMIDE 100 MG/1
CAPSULE ORAL
Qty: 60 CAPSULE | Refills: 3 | Status: SHIPPED | OUTPATIENT
Start: 2023-04-24

## 2023-04-24 RX ORDER — DULOXETIN HYDROCHLORIDE 60 MG/1
60 CAPSULE, DELAYED RELEASE ORAL DAILY
COMMUNITY
Start: 2023-03-01

## 2023-04-24 RX ORDER — AMIODARONE HYDROCHLORIDE 200 MG/1
TABLET ORAL
Qty: 180 TABLET | Refills: 3 | Status: SHIPPED | OUTPATIENT
Start: 2023-04-24

## 2023-04-24 NOTE — PATIENT INSTRUCTIONS
Change Vimpat to 50 mg twice a day for one week, then 50 mg once a day for one week then stop the Vimpat. Start Zonegran 100 mg daily for one week, then 200 mg daily thereafter. Stop Primidone. Continue Requip 0.5 mg three times a day thereafter. No driving, swimming, operating heavy machinery or compromising heights until event free for 6 months. Report any new events. Call if any questions. Continue following with cardiology  Call with any new symptoms or concerns. Follow up in 4 weeks.

## 2023-04-24 NOTE — PROGRESS NOTES
NEUROLOGY OUT PATIENT FOLLOW UP NOTE:  4/24/20231:04 PM    Lenoard Moritz is here for follow up for syncope, mixed tremor, seizure. He is here to go over plan. Patient Active Problem List   Diagnosis    Hypertension    Hyperlipidemia    Anxiety    Abnormal findings on cardiac catheterization    Spondylolisthesis    CAD (coronary artery disease) cath 06/2011 40 to 50% stenosis in mid LAD, Patent diagonal stent,40 to 50% Mid Lcx stenosis,patent RCA stent EF 55%    GERD (gastroesophageal reflux disease)    Syncopal episodes    Paroxysmal atrial fibrillation (HCC)    Medtronic linq loop recorder    Bradycardia    Angina of effort ([address]    Status post angioplasty with stent    S/P CABG x 2    CAD in native artery    COVID    Syncope, unspecified syncope type    Seizure (HCC)    Abnormal EKG    . Follow up for tremor, seizure like activity. He is here to go over plan. Allergies   Allergen Reactions    Adhesive Tape Rash       Current Outpatient Medications:     DULoxetine (CYMBALTA) 60 MG extended release capsule, Take 1 capsule by mouth daily, Disp: , Rfl:     amiodarone (CORDARONE) 200 MG tablet, 200 mg twice daily for 1 week, then daily after 1 week, Disp: 180 tablet, Rfl: 3    Cholecalciferol (VITAMIN D3) 25 MCG (1000 UT) CAPS, Take by mouth, Disp: , Rfl:     primidone (MYSOLINE) 50 MG tablet, Take 1 tablet by mouth in the morning and at bedtime, Disp: 180 tablet, Rfl: 1    bethanechol (URECHOLINE) 25 MG tablet, Take 1 tablet by mouth 3 times daily, Disp: , Rfl:     lacosamide (VIMPAT) 100 MG TABS tablet, Take 1 tablet by mouth 2 times daily for 60 days. , Disp: 60 tablet, Rfl: 3    rOPINIRole (REQUIP) 0.5 MG tablet, Take Requip 0.25 mg oral three times a day for 7 days then 0.5 mg three times a day thereafter., Disp: 90 tablet, Rfl: 2    irbesartan-hydroCHLOROthiazide (AVALIDE) 150-12.5 MG per tablet, Take 1 tablet by mouth daily, Disp: 90 tablet, Rfl: 1    potassium chloride (KLOR-CON M) 20 MEQ

## 2023-04-24 NOTE — PROGRESS NOTES
[id number] \[facility]\"" [place] [facility]\"".  [address]  [facility] [address]  Dept: [phone]  Dept Fax: [phone]  Loc: [phone]    Visit Date: 4/24/2023    Elli Meehan is a 70 y.o. male who presents todayfor:  Chief Complaint   Patient presents with    Atrial Fibrillation    Follow-up    Hypertension    Hyperlipidemia    Coronary Artery Disease   Had a urgent CABG   Failed PTCA   Then had emergency CABG  Now dealing with tremors  Seizures !!   Seeing neurology   Very unhappy   No chest pain   Some baseline dyspnea  Now with A fib on the event monitor  New to him   Intermittent in nature  Off of sotalol       HPI:  HPI  Past Medical History:   Diagnosis Date    Anxiety     Bradycardia 4/8/2016    Heart disease     Hyperlipidemia     Hypertension     Medtronic linq loop recorder 4/8/2016    Neuropathy     Paroxysmal atrial fibrillation ([address] 9/23/2015    Spondylolisthesis     Syncopal episodes 9/23/2015      Past Surgical History:   Procedure Laterality Date    ANGIOPLASTY  07' 10'    x5    BACK SURGERY  2008    CATARACT REMOVAL  05/04/2021    CHOLECYSTECTOMY      CORONARY ANGIOPLASTY WITH STENT PLACEMENT  [id number]    CORONARY ARTERY BYPASS GRAFT N/A 11/8/2022    CABG x2 RAYA OFF PUMP performed by Shahram Grace MD at [address] ARTHROSCOPY  1990 1990, 2021    SHOULDER ARTHROSCOPY      TONSILLECTOMY      TURP  2015     Family History   Problem Relation Age of Onset    Heart Disease Mother 62        CABG    High Cholesterol Mother     Cancer Mother         Breast Cancer, Uterine    Cancer Father         lung cancer    High Cholesterol Father     Heart Disease Father 61        stents    Cancer Brother         lung    Asthma Brother      Social History     Tobacco Use    Smoking status: Former     Packs/day: 5.00     Years: 15.00     Pack years: 75.00     Types: Cigarettes     Quit date: 02/2000

## 2023-04-27 ENCOUNTER — HOSPITAL ENCOUNTER (OUTPATIENT)
Dept: NON INVASIVE DIAGNOSTICS | Age: 78
Discharge: HOME | End: 2023-04-27
Payer: MEDICARE

## 2023-04-27 DIAGNOSIS — I48.0 PAROXYSMAL ATRIAL FIBRILLATION (HCC): ICD-10-CM

## 2023-04-27 DIAGNOSIS — I10 PRIMARY HYPERTENSION: ICD-10-CM

## 2023-04-27 DIAGNOSIS — I25.810 CORONARY ARTERY DISEASE INVOLVING CORONARY BYPASS GRAFT OF NATIVE HEART WITHOUT ANGINA PECTORIS: ICD-10-CM

## 2023-04-27 DIAGNOSIS — E78.01 FAMILIAL HYPERCHOLESTEROLEMIA: ICD-10-CM

## 2023-04-27 LAB
LV EF: 50 %
LVEF MODALITY: NORMAL

## 2023-04-27 PROCEDURE — 93306 TTE W/DOPPLER COMPLETE: CPT

## 2023-05-23 DIAGNOSIS — R25.9 MIXED ACTION AND RESTING TREMOR: ICD-10-CM

## 2023-05-23 RX ORDER — ROPINIROLE 0.5 MG/1
TABLET, FILM COATED ORAL
Qty: 90 TABLET | Refills: 3 | Status: SHIPPED | OUTPATIENT
Start: 2023-05-23

## 2023-06-19 ENCOUNTER — OFFICE VISIT (OUTPATIENT)
Dept: NEUROLOGY | Age: 78
End: 2023-06-19

## 2023-06-19 VITALS
BODY MASS INDEX: 37.28 KG/M2 | HEART RATE: 66 BPM | SYSTOLIC BLOOD PRESSURE: 130 MMHG | HEIGHT: 66 IN | DIASTOLIC BLOOD PRESSURE: 70 MMHG | OXYGEN SATURATION: 97 % | WEIGHT: 232 LBS

## 2023-06-19 DIAGNOSIS — R56.9 SEIZURE (HCC): ICD-10-CM

## 2023-06-19 DIAGNOSIS — R56.9 SEIZURE-LIKE ACTIVITY (HCC): ICD-10-CM

## 2023-06-19 DIAGNOSIS — R42 DIZZINESS: ICD-10-CM

## 2023-06-19 DIAGNOSIS — R25.9 MIXED ACTION AND RESTING TREMOR: Primary | ICD-10-CM

## 2023-06-19 DIAGNOSIS — R55 SYNCOPE AND COLLAPSE: ICD-10-CM

## 2023-06-19 RX ORDER — ROPINIROLE 0.5 MG/1
TABLET, FILM COATED ORAL
Qty: 270 TABLET | Refills: 1 | Status: SHIPPED | OUTPATIENT
Start: 2023-06-19

## 2023-06-19 RX ORDER — ZONISAMIDE 100 MG/1
200 CAPSULE ORAL DAILY
Qty: 180 CAPSULE | Refills: 3 | Status: SHIPPED | OUTPATIENT
Start: 2023-06-19

## 2023-06-19 RX ORDER — TIZANIDINE 4 MG/1
4 TABLET ORAL EVERY 6 HOURS PRN
COMMUNITY

## 2023-06-19 NOTE — PROGRESS NOTES
NEUROLOGY OUT PATIENT FOLLOW UP NOTE:  6/19/20233:31 PM    Neha You is here for follow up for syncope, mixed tremor, seizure. Allergies   Allergen Reactions    Adhesive Tape Rash       Current Outpatient Medications:     tiZANidine (ZANAFLEX) 4 MG tablet, Take 1 tablet by mouth every 6 hours as needed, Disp: , Rfl:     rOPINIRole (REQUIP) 0.5 MG tablet, TAKE ONE TABLET BY MOUTH 3 TIMES DAILY, Disp: 90 tablet, Rfl: 3    DULoxetine (CYMBALTA) 60 MG extended release capsule, Take 1 capsule by mouth daily, Disp: , Rfl:     amiodarone (CORDARONE) 200 MG tablet, 200 mg twice daily for 1 week, then daily after 1 week, Disp: 180 tablet, Rfl: 3    Cholecalciferol (VITAMIN D3) 25 MCG (1000 UT) CAPS, Take by mouth, Disp: , Rfl:     zonisamide (ZONEGRAN) 100 MG capsule, Take one capsule daily for one week, then two capsules daily thereafter. Take with plenty of fluids. , Disp: 60 capsule, Rfl: 3    bethanechol (URECHOLINE) 25 MG tablet, Take 1 tablet by mouth 3 times daily, Disp: , Rfl:     irbesartan-hydroCHLOROthiazide (AVALIDE) 150-12.5 MG per tablet, Take 1 tablet by mouth daily, Disp: 90 tablet, Rfl: 1    potassium chloride (KLOR-CON M) 20 MEQ extended release tablet, Take 1 tablet by mouth 2 times daily, Disp: 180 tablet, Rfl: 1    metroNIDAZOLE (METROGEL) 1 % gel, Apply topically daily Apply topically daily. , Disp: , Rfl:     clopidogrel (PLAVIX) 75 MG tablet, Take 1 tablet by mouth daily, Disp: 90 tablet, Rfl: 3    nitroGLYCERIN (NITROSTAT) 0.4 MG SL tablet, Place 1 tablet under the tongue every 5 minutes as needed for Chest pain up to max of 3 total doses.  If no relief after 1 dose, call 911., Disp: 25 tablet, Rfl: 3    metoprolol tartrate (LOPRESSOR) 25 MG tablet, Take 1 tablet by mouth 2 times daily, Disp: 180 tablet, Rfl: 3    pravastatin (PRAVACHOL) 80 MG tablet, Take 1 tablet by mouth once daily, Disp: 90 tablet, Rfl: 0    vitamin B-12 1000 MCG tablet, Take 1 tablet by mouth daily, Disp: 30 tablet,

## 2023-06-19 NOTE — PATIENT INSTRUCTIONS
Continue with Zonegran  200 mg daily. Refills given  Continue Requip 0.5 mg three times a day thereafter. Refills given   Tilt table test  No driving, swimming, operating heavy machinery or compromising heights until event free for 6 months. Report any new events. Call if any questions. Continue following with cardiology re: need for [facility] as his event monitor showed atrial fibrillation   Call with any new symptoms or concerns  Follow up in 1 month or sooner if needed.

## 2023-06-22 ENCOUNTER — HOSPITAL ENCOUNTER (OUTPATIENT)
Age: 78
Discharge: HOME | End: 2023-06-22
Payer: MEDICARE

## 2023-06-22 ENCOUNTER — HOSPITAL ENCOUNTER (OUTPATIENT)
Dept: GENERAL RADIOLOGY | Age: 78
Discharge: HOME | End: 2023-06-22
Payer: MEDICARE

## 2023-06-22 ENCOUNTER — OFFICE VISIT (OUTPATIENT)
Dept: CARDIOLOGY CLINIC | Age: 78
End: 2023-06-22
Payer: MEDICARE

## 2023-06-22 VITALS
BODY MASS INDEX: 36.67 KG/M2 | SYSTOLIC BLOOD PRESSURE: 118 MMHG | WEIGHT: 228.2 LBS | HEIGHT: 66 IN | HEART RATE: 64 BPM | DIASTOLIC BLOOD PRESSURE: 80 MMHG

## 2023-06-22 DIAGNOSIS — I48.0 PAROXYSMAL ATRIAL FIBRILLATION (HCC): ICD-10-CM

## 2023-06-22 DIAGNOSIS — R06.02 SHORTNESS OF BREATH: ICD-10-CM

## 2023-06-22 DIAGNOSIS — I10 PRIMARY HYPERTENSION: ICD-10-CM

## 2023-06-22 DIAGNOSIS — I25.708 CORONARY ARTERY DISEASE INVOLVING CORONARY BYPASS GRAFT OF NATIVE HEART WITH OTHER FORMS OF ANGINA PECTORIS (HCC): Primary | ICD-10-CM

## 2023-06-22 DIAGNOSIS — E78.00 PURE HYPERCHOLESTEROLEMIA: ICD-10-CM

## 2023-06-22 DIAGNOSIS — I25.708 CORONARY ARTERY DISEASE INVOLVING CORONARY BYPASS GRAFT OF NATIVE HEART WITH OTHER FORMS OF ANGINA PECTORIS (HCC): ICD-10-CM

## 2023-06-22 PROCEDURE — 71046 X-RAY EXAM CHEST 2 VIEWS: CPT

## 2023-06-22 PROCEDURE — 3074F SYST BP LT 130 MM HG: CPT | Performed by: NUCLEAR MEDICINE

## 2023-06-22 PROCEDURE — 1123F ACP DISCUSS/DSCN MKR DOCD: CPT | Performed by: NUCLEAR MEDICINE

## 2023-06-22 PROCEDURE — 3079F DIAST BP 80-89 MM HG: CPT | Performed by: NUCLEAR MEDICINE

## 2023-06-22 PROCEDURE — 99214 OFFICE O/P EST MOD 30 MIN: CPT | Performed by: NUCLEAR MEDICINE

## 2023-06-22 NOTE — PROGRESS NOTES
75.00     Types: Cigarettes     Quit date: 2000     Years since quittin.4    Smokeless tobacco: Never   Substance Use Topics    Alcohol use: Yes     Comment: couple beers a night      Current Outpatient Medications   Medication Sig Dispense Refill    tiZANidine (ZANAFLEX) 4 MG tablet Take 1 tablet by mouth every 6 hours as needed      zonisamide (ZONEGRAN) 100 MG capsule Take 2 capsules by mouth daily Take with plenty of fluids. 180 capsule 3    rOPINIRole (REQUIP) 0.5 MG tablet TAKE ONE TABLET BY MOUTH 3 TIMES DAILY 270 tablet 1    DULoxetine (CYMBALTA) 60 MG extended release capsule Take 1 capsule by mouth daily      amiodarone (CORDARONE) 200 MG tablet 200 mg twice daily for 1 week, then daily after 1 week 180 tablet 3    Cholecalciferol (VITAMIN D3) 25 MCG (1000 UT) CAPS Take by mouth      bethanechol (URECHOLINE) 25 MG tablet Take 1 tablet by mouth 3 times daily      irbesartan-hydroCHLOROthiazide (AVALIDE) 150-12.5 MG per tablet Take 1 tablet by mouth daily 90 tablet 1    potassium chloride (KLOR-CON M) 20 MEQ extended release tablet Take 1 tablet by mouth 2 times daily 180 tablet 1    metroNIDAZOLE (METROGEL) 1 % gel Apply topically daily Apply topically daily. nitroGLYCERIN (NITROSTAT) 0.4 MG SL tablet Place 1 tablet under the tongue every 5 minutes as needed for Chest pain up to max of 3 total doses. If no relief after 1 dose, call 911. 25 tablet 3    metoprolol tartrate (LOPRESSOR) 25 MG tablet Take 1 tablet by mouth 2 times daily 180 tablet 3    pravastatin (PRAVACHOL) 80 MG tablet Take 1 tablet by mouth once daily 90 tablet 0    vitamin B-12 1000 MCG tablet Take 1 tablet by mouth daily 30 tablet 0    aspirin EC 81 MG EC tablet Take 1 tablet by mouth daily 90 tablet 1    pantoprazole sodium (PROTONIX) 40 MG PACK packet Take 1 packet by mouth every morning (before breakfast)      pregabalin (LYRICA) 50 MG capsule Take 2 capsules by mouth 3 times daily.       tamsulosin (FLOMAX) 0.4 MG capsule

## 2023-06-26 ENCOUNTER — TELEPHONE (OUTPATIENT)
Dept: CARDIOLOGY CLINIC | Age: 78
End: 2023-06-26

## 2023-06-26 DIAGNOSIS — I10 PRIMARY HYPERTENSION: ICD-10-CM

## 2023-06-26 DIAGNOSIS — R06.02 SHORTNESS OF BREATH: ICD-10-CM

## 2023-06-26 DIAGNOSIS — I25.708 CORONARY ARTERY DISEASE INVOLVING CORONARY BYPASS GRAFT OF NATIVE HEART WITH OTHER FORMS OF ANGINA PECTORIS (HCC): Primary | ICD-10-CM

## 2023-06-27 RX ORDER — IRBESARTAN AND HYDROCHLOROTHIAZIDE 150; 12.5 MG/1; MG/1
TABLET, FILM COATED ORAL
Qty: 90 TABLET | Refills: 3 | Status: SHIPPED | OUTPATIENT
Start: 2023-06-27

## 2023-07-10 ENCOUNTER — PRE-PROCEDURE TELEPHONE (OUTPATIENT)
Dept: INPATIENT UNIT | Age: 78
End: 2023-07-10

## 2023-07-10 NOTE — PROGRESS NOTES
Message left  NPO after midnight - take am meds with sip of water. Hold eliquis as per Dr Galan Host office.   Bring drivers license and insurance information  Wear comfortable clean clothes  Shower morning of and night before with liquid antibacterial soap  Remove jewelry   May have to stay overnight if have PTCA/stent  Bring medications in original bottles  Made aware of visitors limit to 2 at a time  Follow all instructions given by your physician  Please notify doctor office if you need to cancel or reschedule your procedure   needed at discharge

## 2023-07-21 ENCOUNTER — HOSPITAL ENCOUNTER (OUTPATIENT)
Dept: INPATIENT UNIT | Age: 78
Setting detail: OBSERVATION
Discharge: HOME | End: 2023-07-22
Attending: NUCLEAR MEDICINE | Admitting: INTERNAL MEDICINE
Payer: MEDICARE

## 2023-07-21 DIAGNOSIS — Z98.890 S/P CARDIAC CATH: Primary | ICD-10-CM

## 2023-07-21 LAB
ABO: NORMAL
ACTIVATED CLOTTING TIME: 245 SECONDS (ref 1–150)
ANION GAP SERPL CALC-SCNC: 10 MEQ/L (ref 8–16)
ANTIBODY SCREEN: NORMAL
BUN SERPL-MCNC: 18 MG/DL (ref 7–22)
CALCIUM SERPL-MCNC: 9.3 MG/DL (ref 8.5–10.5)
CHLORIDE SERPL-SCNC: 105 MEQ/L (ref 98–111)
CO2 SERPL-SCNC: 25 MEQ/L (ref 23–33)
CREAT SERPL-MCNC: 1.3 MG/DL (ref 0.4–1.2)
DEPRECATED RDW RBC AUTO: 50.9 FL (ref 35–45)
EKG ATRIAL RATE: 58 BPM
EKG P AXIS: 67 DEGREES
EKG P-R INTERVAL: 210 MS
EKG Q-T INTERVAL: 434 MS
EKG QRS DURATION: 94 MS
EKG QTC CALCULATION (BAZETT): 426 MS
EKG R AXIS: 23 DEGREES
EKG T AXIS: 87 DEGREES
EKG VENTRICULAR RATE: 58 BPM
ERYTHROCYTE [DISTWIDTH] IN BLOOD BY AUTOMATED COUNT: 14.6 % (ref 11.5–14.5)
GFR SERPL CREATININE-BSD FRML MDRD: 57 ML/MIN/1.73M2
GLUCOSE SERPL-MCNC: 131 MG/DL (ref 70–108)
HCT VFR BLD AUTO: 39.1 % (ref 42–52)
HGB BLD-MCNC: 12.4 GM/DL (ref 14–18)
INR PPP: 1.05 (ref 0.85–1.13)
MCH RBC QN AUTO: 30 PG (ref 26–33)
MCHC RBC AUTO-ENTMCNC: 31.7 GM/DL (ref 32.2–35.5)
MCV RBC AUTO: 94.4 FL (ref 80–94)
PLATELET # BLD AUTO: 205 THOU/MM3 (ref 130–400)
PMV BLD AUTO: 10.7 FL (ref 9.4–12.4)
POTASSIUM SERPL-SCNC: 4.5 MEQ/L (ref 3.5–5.2)
RBC # BLD AUTO: 4.14 MILL/MM3 (ref 4.7–6.1)
RH FACTOR: NORMAL
SODIUM SERPL-SCNC: 140 MEQ/L (ref 135–145)
WBC # BLD AUTO: 7.3 THOU/MM3 (ref 4.8–10.8)

## 2023-07-21 PROCEDURE — 6370000000 HC RX 637 (ALT 250 FOR IP)

## 2023-07-21 PROCEDURE — C1894 INTRO/SHEATH, NON-LASER: HCPCS

## 2023-07-21 PROCEDURE — 86850 RBC ANTIBODY SCREEN: CPT

## 2023-07-21 PROCEDURE — C1753 CATH, INTRAVAS ULTRASOUND: HCPCS

## 2023-07-21 PROCEDURE — 93459 L HRT ART/GRFT ANGIO: CPT

## 2023-07-21 PROCEDURE — C1887 CATHETER, GUIDING: HCPCS

## 2023-07-21 PROCEDURE — G0378 HOSPITAL OBSERVATION PER HR: HCPCS

## 2023-07-21 PROCEDURE — 80048 BASIC METABOLIC PNL TOTAL CA: CPT

## 2023-07-21 PROCEDURE — 86900 BLOOD TYPING SEROLOGIC ABO: CPT

## 2023-07-21 PROCEDURE — 86901 BLOOD TYPING SEROLOGIC RH(D): CPT

## 2023-07-21 PROCEDURE — 93005 ELECTROCARDIOGRAM TRACING: CPT | Performed by: NUCLEAR MEDICINE

## 2023-07-21 PROCEDURE — 6360000004 HC RX CONTRAST MEDICATION: Performed by: NUCLEAR MEDICINE

## 2023-07-21 PROCEDURE — 6370000000 HC RX 637 (ALT 250 FOR IP): Performed by: INTERNAL MEDICINE

## 2023-07-21 PROCEDURE — C1769 GUIDE WIRE: HCPCS

## 2023-07-21 PROCEDURE — 93005 ELECTROCARDIOGRAM TRACING: CPT | Performed by: INTERNAL MEDICINE

## 2023-07-21 PROCEDURE — 6360000002 HC RX W HCPCS

## 2023-07-21 PROCEDURE — 2580000003 HC RX 258: Performed by: NUCLEAR MEDICINE

## 2023-07-21 PROCEDURE — 93459 L HRT ART/GRFT ANGIO: CPT | Performed by: NUCLEAR MEDICINE

## 2023-07-21 PROCEDURE — C1760 CLOSURE DEV, VASC: HCPCS

## 2023-07-21 PROCEDURE — C1874 STENT, COATED/COV W/DEL SYS: HCPCS

## 2023-07-21 PROCEDURE — 93010 ELECTROCARDIOGRAM REPORT: CPT | Performed by: INTERNAL MEDICINE

## 2023-07-21 PROCEDURE — 36415 COLL VENOUS BLD VENIPUNCTURE: CPT

## 2023-07-21 PROCEDURE — 85610 PROTHROMBIN TIME: CPT

## 2023-07-21 PROCEDURE — 92978 ENDOLUMINL IVUS OCT C 1ST: CPT

## 2023-07-21 PROCEDURE — 85347 COAGULATION TIME ACTIVATED: CPT

## 2023-07-21 PROCEDURE — 2500000003 HC RX 250 WO HCPCS

## 2023-07-21 PROCEDURE — 85027 COMPLETE CBC AUTOMATED: CPT

## 2023-07-21 PROCEDURE — 2580000003 HC RX 258: Performed by: INTERNAL MEDICINE

## 2023-07-21 PROCEDURE — C9600 PERC DRUG-EL COR STENT SING: HCPCS

## 2023-07-21 PROCEDURE — C1725 CATH, TRANSLUMIN NON-LASER: HCPCS

## 2023-07-21 RX ORDER — DULOXETIN HYDROCHLORIDE 60 MG/1
60 CAPSULE, DELAYED RELEASE ORAL DAILY
Status: DISCONTINUED | OUTPATIENT
Start: 2023-07-22 | End: 2023-07-22 | Stop reason: HOSPADM

## 2023-07-21 RX ORDER — ASPIRIN 81 MG/1
81 TABLET, CHEWABLE ORAL DAILY
Status: DISCONTINUED | OUTPATIENT
Start: 2023-07-22 | End: 2023-07-22 | Stop reason: HOSPADM

## 2023-07-21 RX ORDER — ROPINIROLE 0.5 MG/1
0.5 TABLET, FILM COATED ORAL 3 TIMES DAILY
Status: DISCONTINUED | OUTPATIENT
Start: 2023-07-21 | End: 2023-07-22 | Stop reason: HOSPADM

## 2023-07-21 RX ORDER — SODIUM CHLORIDE 0.9 % (FLUSH) 0.9 %
5-40 SYRINGE (ML) INJECTION PRN
Status: DISCONTINUED | OUTPATIENT
Start: 2023-07-21 | End: 2023-07-22 | Stop reason: HOSPADM

## 2023-07-21 RX ORDER — TAMSULOSIN HYDROCHLORIDE 0.4 MG/1
0.4 CAPSULE ORAL 2 TIMES DAILY
Status: DISCONTINUED | OUTPATIENT
Start: 2023-07-21 | End: 2023-07-22 | Stop reason: HOSPADM

## 2023-07-21 RX ORDER — LOSARTAN POTASSIUM 50 MG/1
50 TABLET ORAL DAILY
Status: DISCONTINUED | OUTPATIENT
Start: 2023-07-21 | End: 2023-07-21

## 2023-07-21 RX ORDER — PREGABALIN 50 MG/1
50 CAPSULE ORAL 3 TIMES DAILY
Status: DISCONTINUED | OUTPATIENT
Start: 2023-07-21 | End: 2023-07-22 | Stop reason: HOSPADM

## 2023-07-21 RX ORDER — SODIUM CHLORIDE 9 MG/ML
INJECTION, SOLUTION INTRAVENOUS PRN
Status: DISCONTINUED | OUTPATIENT
Start: 2023-07-21 | End: 2023-07-22 | Stop reason: HOSPADM

## 2023-07-21 RX ORDER — SODIUM CHLORIDE 0.9 % (FLUSH) 0.9 %
5-40 SYRINGE (ML) INJECTION EVERY 12 HOURS SCHEDULED
Status: DISCONTINUED | OUTPATIENT
Start: 2023-07-21 | End: 2023-07-22 | Stop reason: HOSPADM

## 2023-07-21 RX ORDER — OXYCODONE HYDROCHLORIDE AND ACETAMINOPHEN 5; 325 MG/1; MG/1
1 TABLET ORAL EVERY 4 HOURS PRN
Status: DISCONTINUED | OUTPATIENT
Start: 2023-07-21 | End: 2023-07-22 | Stop reason: HOSPADM

## 2023-07-21 RX ORDER — SODIUM CHLORIDE 9 MG/ML
INJECTION, SOLUTION INTRAVENOUS CONTINUOUS
Status: DISCONTINUED | OUTPATIENT
Start: 2023-07-21 | End: 2023-07-22 | Stop reason: HOSPADM

## 2023-07-21 RX ORDER — ASPIRIN 81 MG/1
81 TABLET ORAL DAILY
Status: DISCONTINUED | OUTPATIENT
Start: 2023-07-21 | End: 2023-07-21 | Stop reason: SDUPTHER

## 2023-07-21 RX ORDER — LANOLIN ALCOHOL/MO/W.PET/CERES
1000 CREAM (GRAM) TOPICAL DAILY
Status: DISCONTINUED | OUTPATIENT
Start: 2023-07-21 | End: 2023-07-22 | Stop reason: HOSPADM

## 2023-07-21 RX ORDER — NITROGLYCERIN 0.4 MG/1
0.4 TABLET SUBLINGUAL EVERY 5 MIN PRN
Status: DISCONTINUED | OUTPATIENT
Start: 2023-07-21 | End: 2023-07-22 | Stop reason: HOSPADM

## 2023-07-21 RX ORDER — IRBESARTAN AND HYDROCHLOROTHIAZIDE 150; 12.5 MG/1; MG/1
1 TABLET, FILM COATED ORAL DAILY
Status: DISCONTINUED | OUTPATIENT
Start: 2023-07-22 | End: 2023-07-22 | Stop reason: HOSPADM

## 2023-07-21 RX ORDER — AMIODARONE HYDROCHLORIDE 200 MG/1
200 TABLET ORAL DAILY
Status: DISCONTINUED | OUTPATIENT
Start: 2023-07-22 | End: 2023-07-22 | Stop reason: HOSPADM

## 2023-07-21 RX ORDER — SODIUM CHLORIDE 9 MG/ML
INJECTION, SOLUTION INTRAVENOUS PRN
Status: DISCONTINUED | OUTPATIENT
Start: 2023-07-21 | End: 2023-07-21 | Stop reason: SDUPTHER

## 2023-07-21 RX ORDER — HYDROCHLOROTHIAZIDE 25 MG/1
12.5 TABLET ORAL DAILY
Status: DISCONTINUED | OUTPATIENT
Start: 2023-07-21 | End: 2023-07-21

## 2023-07-21 RX ORDER — BETHANECHOL CHLORIDE 25 MG/1
25 TABLET ORAL 3 TIMES DAILY
Status: DISCONTINUED | OUTPATIENT
Start: 2023-07-21 | End: 2023-07-22 | Stop reason: HOSPADM

## 2023-07-21 RX ORDER — TIZANIDINE 4 MG/1
4 TABLET ORAL EVERY 6 HOURS PRN
Status: DISCONTINUED | OUTPATIENT
Start: 2023-07-21 | End: 2023-07-22 | Stop reason: HOSPADM

## 2023-07-21 RX ORDER — ZONISAMIDE 100 MG/1
200 CAPSULE ORAL DAILY
Status: DISCONTINUED | OUTPATIENT
Start: 2023-07-22 | End: 2023-07-22 | Stop reason: HOSPADM

## 2023-07-21 RX ORDER — ACETAMINOPHEN 325 MG/1
650 TABLET ORAL EVERY 4 HOURS PRN
Status: DISCONTINUED | OUTPATIENT
Start: 2023-07-21 | End: 2023-07-22 | Stop reason: HOSPADM

## 2023-07-21 RX ORDER — POTASSIUM CHLORIDE 20 MEQ/1
20 TABLET, EXTENDED RELEASE ORAL 2 TIMES DAILY
Status: DISCONTINUED | OUTPATIENT
Start: 2023-07-21 | End: 2023-07-22 | Stop reason: HOSPADM

## 2023-07-21 RX ORDER — ASPIRIN 81 MG/1
324 TABLET, CHEWABLE ORAL ONCE
Status: DISCONTINUED | OUTPATIENT
Start: 2023-07-21 | End: 2023-07-22 | Stop reason: HOSPADM

## 2023-07-21 RX ORDER — PRAVASTATIN SODIUM 40 MG
40 TABLET ORAL NIGHTLY
Status: DISCONTINUED | OUTPATIENT
Start: 2023-07-21 | End: 2023-07-21 | Stop reason: ALTCHOICE

## 2023-07-21 RX ORDER — IRBESARTAN AND HYDROCHLOROTHIAZIDE 150; 12.5 MG/1; MG/1
1 TABLET, FILM COATED ORAL DAILY
Status: DISCONTINUED | OUTPATIENT
Start: 2023-07-21 | End: 2023-07-21 | Stop reason: CLARIF

## 2023-07-21 RX ORDER — ATORVASTATIN CALCIUM 80 MG/1
80 TABLET, FILM COATED ORAL NIGHTLY
Status: DISCONTINUED | OUTPATIENT
Start: 2023-07-21 | End: 2023-07-22 | Stop reason: HOSPADM

## 2023-07-21 RX ADMIN — OXYCODONE AND ACETAMINOPHEN 1 TABLET: 5; 325 TABLET ORAL at 21:44

## 2023-07-21 RX ADMIN — TAMSULOSIN HYDROCHLORIDE 0.4 MG: 0.4 CAPSULE ORAL at 21:43

## 2023-07-21 RX ADMIN — POTASSIUM CHLORIDE 20 MEQ: 20 TABLET, EXTENDED RELEASE ORAL at 21:43

## 2023-07-21 RX ADMIN — Medication 25 MG: at 21:43

## 2023-07-21 RX ADMIN — BETHANECHOL CHLORIDE 25 MG: 25 TABLET ORAL at 21:44

## 2023-07-21 RX ADMIN — ROPINIROLE 0.5 MG: 0.5 TABLET ORAL at 17:36

## 2023-07-21 RX ADMIN — ATORVASTATIN CALCIUM 80 MG: 80 TABLET, FILM COATED ORAL at 21:44

## 2023-07-21 RX ADMIN — PREGABALIN 50 MG: 50 CAPSULE ORAL at 17:36

## 2023-07-21 RX ADMIN — IOPAMIDOL 150 ML: 755 INJECTION, SOLUTION INTRAVENOUS at 13:10

## 2023-07-21 RX ADMIN — LOSARTAN POTASSIUM 50 MG: 50 TABLET, FILM COATED ORAL at 17:39

## 2023-07-21 RX ADMIN — SODIUM CHLORIDE: 9 INJECTION, SOLUTION INTRAVENOUS at 21:46

## 2023-07-21 RX ADMIN — HYDROCHLOROTHIAZIDE 12.5 MG: 25 TABLET ORAL at 17:39

## 2023-07-21 RX ADMIN — BETHANECHOL CHLORIDE 25 MG: 25 TABLET ORAL at 17:36

## 2023-07-21 RX ADMIN — PREGABALIN 50 MG: 50 CAPSULE ORAL at 21:43

## 2023-07-21 RX ADMIN — ROPINIROLE 0.5 MG: 0.5 TABLET ORAL at 21:43

## 2023-07-21 RX ADMIN — Medication 1000 MCG: at 21:42

## 2023-07-21 RX ADMIN — TICAGRELOR 90 MG: 90 TABLET ORAL at 23:26

## 2023-07-21 RX ADMIN — SODIUM CHLORIDE: 9 INJECTION, SOLUTION INTRAVENOUS at 10:56

## 2023-07-21 ASSESSMENT — PAIN SCALES - GENERAL
PAINLEVEL_OUTOF10: 5
PAINLEVEL_OUTOF10: 0

## 2023-07-21 ASSESSMENT — PAIN DESCRIPTION - ORIENTATION: ORIENTATION: MID

## 2023-07-21 ASSESSMENT — PAIN DESCRIPTION - LOCATION: LOCATION: BACK

## 2023-07-21 NOTE — PLAN OF CARE
Problem: Discharge Planning  Goal: Discharge to home or other facility with appropriate resources  Outcome: Progressing  Flowsheets  Taken 7/21/2023 1618 by Jonathan Tracy RN  Discharge to home or other facility with appropriate resources:   Identify barriers to discharge with patient and caregiver   Arrange for needed discharge resources and transportation as appropriate   Identify discharge learning needs (meds, wound care, etc)   Arrange for interpreters to assist at discharge as needed   Refer to discharge planning if patient needs post-hospital services based on physician order or complex needs related to functional status, cognitive ability or social support system  Taken 7/21/2023 1039 by Sunday Hawkins RN  Discharge to home or other facility with appropriate resources:   Identify barriers to discharge with patient and caregiver   Identify discharge learning needs (meds, wound care, etc)   Refer to discharge planning if patient needs post-hospital services based on physician order or complex needs related to functional status, cognitive ability or social support system  Taken 7/21/2023 1032 by Sushila Nicholson RN  Discharge to home or other facility with appropriate resources:   Identify barriers to discharge with patient and caregiver   Arrange for needed discharge resources and transportation as appropriate   Identify discharge learning needs (meds, wound care, etc)     Problem: Safety - Adult  Goal: Free from fall injury  Outcome: Progressing  Flowsheets (Taken 7/21/2023 1618)  Free From Fall Injury:   Instruct family/caregiver on patient safety   Based on caregiver fall risk screen, instruct family/caregiver to ask for assistance with transferring infant if caregiver noted to have fall risk factors     Problem: ABCDS Injury Assessment  Goal: Absence of physical injury  Outcome: Progressing  Flowsheets (Taken 7/21/2023 1618)  Absence of Physical Injury: Implement safety measures based on patient

## 2023-07-21 NOTE — H&P
[facility]  Sedation/Analgesia History & Physical    Pt Name: Ivonne Stover  Account number: [de-identified]  MRN: [id number]  YOB: 1945  Provider Performing Procedure: Rosie Carl MD MD [facility] [place]  Primary Care Physician: Abram Chapman MD  Date: 7/21/2023    PRE-PROCEDURE    Code Status: FULL CODE  Brief History/Pre-Procedure Diagnosis:   Angina  CAD  CABG      Consent: : I have discussed with the patient risks, benefits, and alternatives (and relevant risks, benefits, and side effects related to alternatives or not receiving care), and likelihood of the success. The patient and/or representative appear to understand and agree to proceed. The discussion encompasses risks, benefits, and side effects related to the alternatives and the risks related to not receiving the proposed care, treatment, and services. MEDICAL HISTORY  [x]ASHD/ANGINA/MI/CHF   [x]Hypertension  []Diabetes  []Hyperlipidemia  []Smoking  []Family Hx of ASHD  []Additional information:       has a past medical history of Anxiety, Bradycardia, Heart disease, Hyperlipidemia, Hypertension, Medtronic linq loop recorder, Neuropathy, Paroxysmal atrial fibrillation ([address]), Seizures ([address]), Spondylolisthesis, and Syncopal episodes. SURGICAL HISTORY   has a past surgical history that includes Eye surgery; hernia repair; back surgery (2008); Cholecystectomy; angioplasty (07' 10'); Tonsillectomy; Knee arthroscopy (1990); eye surgery; Coronary angioplasty with stent (2042,2925); TURP (2015); Shoulder arthroscopy; Cataract removal (05/04/2021); and Coronary artery bypass graft (N/A, 11/8/2022).   Additional information:       ALLERGIES   Allergies as of 07/21/2023 - Fully Reviewed 07/21/2023   Allergen Reaction Noted    Adhesive tape Rash 11/21/2022     Additional information:       MEDICATIONS   Aspirin  [x] 81 mg  [] 325 mg  [] None  Antiplatelet drug therapy use last 5 days  []No []Yes  Coumadin Use Last 5 Days disease or disturbance  Class 4: Severe systemic disorders that are already life threatening. Class 5: Moribund pt with little chances of survival, for more than 24 hours. Mallampati I Airway Classification:   []1 [x]2 []3 []4    [x]Pre-procedure diagnostic studies complete and results available. Comment:    [x]Previous sedation/anesthesia experiences assessed. Comment:    [x]The patient is an appropriate candidate to undergo the planned procedure sedation and anesthesia. (Refer to nursing sedation/analgesia documentation record)  [x]Formulation and discussion of sedation/procedure plan, risks, and expectations with patient and/or responsible adult completed. [x]Patient examined immediately prior to the procedure.  (Refer to nursing sedation/analgesia documentation record)    Ling Henao MD MD [facility] [place]  Electronically signed 7/21/2023 at 11:55 AM

## 2023-07-21 NOTE — PROCEDURES
[place] [address]                            CARDIAC CATHETERIZATION    PATIENT NAME: Scot Nance                 :        1945  MED REC NO:   [id number]                           ROOM:       0012  ACCOUNT NO:   [de-identified]                           ADMIT DATE: 2023  PROVIDER:     Kyle Michaud M.D.    Nito Lakisha:  2023    CLINICAL HISTORY AND INDICATION:  This is a gentleman who had a recent  angioplasty and stenting of the LAD which ended up with urgent open  heart surgery of LIMA to LAD and diagonal.  Unfortunately, the patient  continues to have angina symptoms that we tried to medicate and treat  medically and followed for a few months. Due to his recurrent symptoms  that were refractory to medical treatment, we decided to proceed with  cardiac cath to evaluate coronary anatomy. PROCEDURES:  1. Left heart cath with left ventriculogram.  2.  Coronary angiogram, right and left. 3.  SVG visualization x1.  4. LIMA to LAD. 5.  Sedation, 2 of Versed, 50 of fentanyl between 12:00 and 01:00 p.m.  in my presence under my supervision. 6.  Blood loss less than 10 mL. PROCEDURE DETAILS:  Please refer to my catheterization detailed note. HEMODYNAMIC RESULTS AND LEFT VENTRICULOGRAM:  Left ventricular  end-diastolic pressure was 12 mmHg. No significant change before and  after contrast injection. No significant gradient across the aortic  valve to signify aortic stenosis. Left ventricular function was mildly  hypokinetic.  EF 45%. CORONARY ARTERIOGRAM RESULTS:  1. Left main has mid 80% stenosis, gives rise to left anterior  descending and left circumflex artery. 2.  Left anterior descending artery is totally occluded. 3.  Left circumflex artery has diffuse mild disease.   4.  Right coronary artery has mild nonobstructive luminal irregularity  with some distal disease all the way at the end of the artery. BYPASS VISUALIZATION:  1. SVG to diagonal is patent, however, it is a very small native artery  and relatively smaller bypass. 2.  LIMA to LAD is patent with good anastomosis. CONCLUSION:  1. Native vessel disease as above with occluded LAD. 2.  Patent grafts to the LAD and diagonal.  3.  Disease in the left main artery which feeds the circ. The left  circumflex artery that has no bypass. 4.  Patent right coronary artery. RECOMMENDATIONS:  At this point, case was discussed with interventional  team and the family. Plan is to consider intervention and stenting of  the left main to the left circumflex artery has a likely cause of the  patient's angina given the stenosis in that area. This will be  reevaluated and dictated by Dr. Laron Davila with interventional today.         Corrine Shea M.D.    D: 07/21/2023 12:54:13       T: 07/21/2023 12:57:45     [email or web address]  Job#: [id number]     Doc#: [id number]    CC:

## 2023-07-21 NOTE — FLOWSHEET NOTE
Pt already transferred to 8AB at this time & unable to document vitals collected at appropriate time slot.

## 2023-07-21 NOTE — BRIEF OP NOTE
[facility]  Sedation/Analgesia Post Sedation Record    Pt Name: Esteban Patel  Account number: [de-identified]  MRN: [id number]  YOB: 1945  Procedure Performed By: MD MD Tayler Rendon [place]  Primary Care Physician: Arabella Oquendo MD  Date: 7/21/2023    POST-PROCEDURE    Physicians/Assistants: MD MD Tayler Rendon, GOMEZ    Procedure Performed:PCI      Sedation/Anesthesia: Versed/ Fentanyl and 2% xylocaine local anesthesia. Estimated Blood Loss: < 50 ml. Specimens Removed: None         Disposition of Specimen: N/A        Complications: No Immediate Complications.        Post-procedure Diagnosis/Findings:       PCI of LM to LCX x 2 TRANG with IVUS guidance          MD MD Tayler Rendon [place]  Electronically signed 7/21/2023 at 1:09 PM  Interventional Cardiology

## 2023-07-21 NOTE — PROGRESS NOTES
1015 Patient admitted to [facility] for heart cath. Patient accompanied by wife. Vital signs obtained. Assessment and data collection intiated. Oriented to room. Policies and procedures for 2E explained. All questions answered with no further questions at this time. Fall prevention and safety precautions discussed with patient. [facility] reviewed with patient and wife. Patient and wife verbalize understanding of the plan of care and contribute to goal setting. Patient and wife very talkative. Patient resting in bed, states he has chronic pain that he lives with that is a 6, states unless he drinks his pain remains a 6, but if drinks a lot pain will go down to a 1.     1135 To cath lab per bed, stable condition. [address] taken over from cath lab, right groin stable . Patient reinstructed on bedrest, instructed to keep legs straight, not to cross legs, not to lift head off of pillow, not to laugh hard, if coughs to guard site with hand, voices understanding, taking water without difficulty. 1550 report to [place] on 8B. 1553 Patient transferred per bed to 8b34, stable condition. Belongings and wife with patient.

## 2023-07-22 VITALS
TEMPERATURE: 98 F | OXYGEN SATURATION: 96 % | DIASTOLIC BLOOD PRESSURE: 66 MMHG | SYSTOLIC BLOOD PRESSURE: 124 MMHG | HEIGHT: 66 IN | BODY MASS INDEX: 37.12 KG/M2 | HEART RATE: 53 BPM | RESPIRATION RATE: 14 BRPM | WEIGHT: 231 LBS

## 2023-07-22 PROBLEM — Z98.890 S/P CARDIAC CATH: Status: ACTIVE | Noted: 2023-07-22

## 2023-07-22 LAB
ANION GAP SERPL CALC-SCNC: 12 MEQ/L (ref 8–16)
BUN SERPL-MCNC: 15 MG/DL (ref 7–22)
CALCIUM SERPL-MCNC: 8.8 MG/DL (ref 8.5–10.5)
CHLORIDE SERPL-SCNC: 107 MEQ/L (ref 98–111)
CHOLEST SERPL-MCNC: 121 MG/DL (ref 100–199)
CO2 SERPL-SCNC: 23 MEQ/L (ref 23–33)
CREAT SERPL-MCNC: 1.3 MG/DL (ref 0.4–1.2)
DEPRECATED RDW RBC AUTO: 49.4 FL (ref 35–45)
ERYTHROCYTE [DISTWIDTH] IN BLOOD BY AUTOMATED COUNT: 14.6 % (ref 11.5–14.5)
GFR SERPL CREATININE-BSD FRML MDRD: 57 ML/MIN/1.73M2
GLUCOSE SERPL-MCNC: 108 MG/DL (ref 70–108)
HCT VFR BLD AUTO: 36.5 % (ref 42–52)
HDLC SERPL-MCNC: 60 MG/DL
HGB BLD-MCNC: 12 GM/DL (ref 14–18)
LDLC SERPL CALC-MCNC: 27 MG/DL
MCH RBC QN AUTO: 30.4 PG (ref 26–33)
MCHC RBC AUTO-ENTMCNC: 32.9 GM/DL (ref 32.2–35.5)
MCV RBC AUTO: 92.4 FL (ref 80–94)
PLATELET # BLD AUTO: 187 THOU/MM3 (ref 130–400)
PMV BLD AUTO: 10.9 FL (ref 9.4–12.4)
POTASSIUM SERPL-SCNC: 3.9 MEQ/L (ref 3.5–5.2)
RBC # BLD AUTO: 3.95 MILL/MM3 (ref 4.7–6.1)
SODIUM SERPL-SCNC: 142 MEQ/L (ref 135–145)
TRIGL SERPL-MCNC: 170 MG/DL (ref 0–199)
WBC # BLD AUTO: 6.9 THOU/MM3 (ref 4.8–10.8)

## 2023-07-22 PROCEDURE — 36415 COLL VENOUS BLD VENIPUNCTURE: CPT

## 2023-07-22 PROCEDURE — G0378 HOSPITAL OBSERVATION PER HR: HCPCS

## 2023-07-22 PROCEDURE — 85027 COMPLETE CBC AUTOMATED: CPT

## 2023-07-22 PROCEDURE — 80048 BASIC METABOLIC PNL TOTAL CA: CPT

## 2023-07-22 PROCEDURE — 80061 LIPID PANEL: CPT

## 2023-07-22 PROCEDURE — 99239 HOSP IP/OBS DSCHRG MGMT >30: CPT | Performed by: NURSE PRACTITIONER

## 2023-07-22 RX ORDER — ATORVASTATIN CALCIUM 80 MG/1
80 TABLET, FILM COATED ORAL NIGHTLY
Qty: 30 TABLET | Refills: 3 | Status: SHIPPED | OUTPATIENT
Start: 2023-07-22

## 2023-07-22 RX ORDER — OXYCODONE HYDROCHLORIDE AND ACETAMINOPHEN 5; 325 MG/1; MG/1
1 TABLET ORAL EVERY 6 HOURS PRN
Qty: 12 TABLET | Refills: 0 | Status: SHIPPED | OUTPATIENT
Start: 2023-07-22 | End: 2023-07-25

## 2023-07-22 RX ADMIN — BETHANECHOL CHLORIDE 25 MG: 25 TABLET ORAL at 07:47

## 2023-07-22 RX ADMIN — PREGABALIN 50 MG: 50 CAPSULE ORAL at 07:47

## 2023-07-22 RX ADMIN — ROPINIROLE 0.5 MG: 0.5 TABLET ORAL at 07:47

## 2023-07-22 RX ADMIN — ASPIRIN 81 MG: 81 TABLET, CHEWABLE ORAL at 07:47

## 2023-07-22 RX ADMIN — TAMSULOSIN HYDROCHLORIDE 0.4 MG: 0.4 CAPSULE ORAL at 07:47

## 2023-07-22 RX ADMIN — ZONISAMIDE 200 MG: 100 CAPSULE ORAL at 07:47

## 2023-07-22 RX ADMIN — IRBESARTAN AND HYDROCHLOROTHIAZIDE 1 TABLET: 150; 12.5 TABLET, FILM COATED ORAL at 07:47

## 2023-07-22 RX ADMIN — DULOXETIN HYDROCHLORIDE 60 MG: 60 CAPSULE, DELAYED RELEASE ORAL at 07:47

## 2023-07-22 RX ADMIN — POTASSIUM CHLORIDE 20 MEQ: 20 TABLET, EXTENDED RELEASE ORAL at 07:47

## 2023-07-22 RX ADMIN — AMIODARONE HYDROCHLORIDE 200 MG: 200 TABLET ORAL at 07:47

## 2023-07-22 ASSESSMENT — PAIN SCALES - GENERAL
PAINLEVEL_OUTOF10: 0
PAINLEVEL_OUTOF10: 0

## 2023-07-22 NOTE — PROCEDURES
[place] [address]                            CARDIAC CATHETERIZATION    PATIENT NAME: Gian Aguilar                 :        1945  MED REC NO:   [id number]                           ROOM:       0034  ACCOUNT NO:   [de-identified]                           ADMIT DATE: 2023  PROVIDER:     Loreta Colby MD    DATE OF PROCEDURE:  2023    INDICATION:  This patient with CCS class III anginal symptoms after  recent emergency CABG for LAD diagonal, stenting related complications,  find recurrent symptoms after CABG concerning for recurrent angina. DESCRIPTION OF PROCEDURE:  After informed consent was obtained from the  patient, he was brought to the cardiac catheterization laboratory and  prepped and draped in a sterile fashion. For the access, diagnostic  catheter was used, and the diagnostic angiography was performed by Dr. Bertha Rollins. Please see his note for further details. In brief, the  patient demonstrated what appeared to be patent mid LAD, patent SVG to  diagonal, small nonobstructive RCA; however, there was severe stenosis  of the left main to circumflex system, which is resulting in the  patient's symptoms. Therefore, we proceeded with intervention of this  system. I exchanged out for 6-Burmese sheath. I cannulated the left  main with a 6-Burmese VL3.5 guiding catheter. Heparin IV was given. ACT  was confirmed to be above 250 seconds. I wired the lesion from the left  main into the circumflex with a Runthrough wire. Heparin IV was given. ACT was confirmed to be above 250 seconds. I predilated the lesion  using a 4.0 x 15 NC balloon up to 12 atmospheres and stented the lesion  from the left into the circumflex with 3.0 x 34 Qasim Craighead TRANG up to  11 atmospheres due to stent size mismatch of the vessel.   I then used a  3.5 x 12 distally to the first stent deployed and deployed this at

## 2023-07-22 NOTE — PROGRESS NOTES
Heart attack teaching covered with patient and/or family or significant other:  Signs and symptoms of a heart attack. When to call 911 and the importance of calling 911. Personal risk factors and ways to lower their risk. 4.   Importance of quitting smoking if applicable. Heart attack booklet given to the patient and/or family or significant other. Reviewed: How to take Nitroglycerin. The importance of participating in Cardiac Rehab and hours of operation. Heart Healthy Diet. Risk factor modification. (Overweight, Obesity, Diabetes, Hypertension, Smoking, High Cholesterol, Stress)  Discharge instructions for Cath/Intervention procedure site if applicable. Discharge teaching and instructions for diagnosis/procedure of heart cath completed with patient using teachback method. AVS reviewed. Printed prescriptions given to patient. Patient voiced understanding regarding prescriptions, follow up appointments, and care of self at home.  Discharged in a wheelchair to  home with support per wife

## 2023-07-22 NOTE — PROGRESS NOTES
Rogelio Mackey M.D.     D: 07/21/2023       SUMMARY:  Successful PCI of left main to left circumflex with two  drug-eluting stents optimized with IVUS guidance. PLAN:  1. Bedrest.  2.  Optimal medical therapy. 3.  Risk factor management. 4.  Routine access site care. 5. DAPT. 6.  IV fluids. 7.  Overnight inpatient observation and admission. 8.  Guideline-directed therapy for CAD. 9. Uptitrate antianginals. 10.  Follow up with Dr. Rogelio Mackey in two to four weeks postprocedure. All the above was explained to the patient and patient's family. They  were agreeable and amenable to the above plan. Maureen Silvestre MD     D: 07/21/2023     Lab Data:    Cardiac Enzymes:  No results for input(s): CKTOTAL, CKMB, CKMBINDEX, TROPONINI in the last 72 hours.     CBC:   Lab Results   Component Value Date/Time    WBC 6.9 07/22/2023 05:46 AM    RBC 3.95 07/22/2023 05:46 AM    HGB 12.0 07/22/2023 05:46 AM    HCT 36.5 07/22/2023 05:46 AM     07/22/2023 05:46 AM       CMP:    Lab Results   Component Value Date/Time     07/22/2023 05:46 AM    K 3.9 07/22/2023 05:46 AM    K 4.3 11/23/2022 04:26 AM     07/22/2023 05:46 AM    CO2 23 07/22/2023 05:46 AM    BUN 15 07/22/2023 05:46 AM    CREATININE 1.3 07/22/2023 05:46 AM    LABGLOM 57 07/22/2023 05:46 AM    GLUCOSE 108 07/22/2023 05:46 AM    CALCIUM 8.8 07/22/2023 05:46 AM       Hepatic Function Panel:    Lab Results   Component Value Date/Time    ALKPHOS 55 11/23/2022 04:26 AM    ALT 16 11/23/2022 04:26 AM    AST 14 11/23/2022 04:26 AM    PROT 5.3 11/23/2022 04:26 AM    BILITOT 0.6 11/23/2022 04:26 AM    LABALBU 3.4 11/23/2022 04:26 AM       Magnesium:    Lab Results   Component Value Date/Time    MG 2.0 11/22/2022 03:32 AM       PT/INR:    Lab Results   Component Value Date/Time    INR 1.05 07/21/2023 10:17 AM       HgBA1c:  No results found for: LABA1C    FLP:    Lab Results   Component Value Date/Time    TRIG 170 07/22/2023 05:46 AM    HDL 60 07/22/2023 05:46 AM    LDLCALC 27 07/22/2023 05:46 AM       TSH:    Lab Results   Component Value Date/Time    TSH 5.420 11/21/2022 04:19 AM         Assessment:    Sp OP elective cardiac cath   Successful PCI of left main to left circumflex with two  drug-eluting stents optimized with IVUS guidance. HTN  HLP LDL 27    Hx PAFB - eliquis / amiodarone   - SR maintained       Plan:  Home  Follow 2-3 weeks     Patient and provider goals: Feel better and have more energy, Begin Cardiac Rehab, and Start exercise program       Cardiac Rehab: Yes  discussed with pt ICR - he agrees to program - he understands that it will start in 3 weeks       Follow-up visits:   No follow-up provider specified.      Discharge condition: stable  Disposition: Home  Time spent on discharge: greater then 30  minutes       Discharge Medications for PCI/MI (performed or attempted):   ASA: yes  Statin: yes  P2Y12 Inhibitor: yes  Beta Blocker: yes  ACE / ARB: yes  Nitro SL: yes      Discharge Medications for ICD, Cardiomyopathy, CHF:  Beta Blocker: yes  ACE Inhibitor/ARB: yes               Electronically signed by MARIZOL Jain CNP on 7/22/2023 at 9:06 AM

## 2023-07-22 NOTE — PLAN OF CARE
Problem: Discharge Planning  Goal: Discharge to home or other facility with appropriate resources  7/22/2023 0250 by Veronica Osei RN  Outcome: Progressing  7/21/2023 1618 by Ariel Travis RN  Outcome: Progressing  Flowsheets  Taken 7/21/2023 1618 by Ariel Travis RN  Discharge to home or other facility with appropriate resources:   Identify barriers to discharge with patient and caregiver   Arrange for needed discharge resources and transportation as appropriate   Identify discharge learning needs (meds, wound care, etc)   Arrange for interpreters to assist at discharge as needed   Refer to discharge planning if patient needs post-hospital services based on physician order or complex needs related to functional status, cognitive ability or social support system  Taken 7/21/2023 1039 by Isai Fierro RN  Discharge to home or other facility with appropriate resources:   Identify barriers to discharge with patient and caregiver   Identify discharge learning needs (meds, wound care, etc)   Refer to discharge planning if patient needs post-hospital services based on physician order or complex needs related to functional status, cognitive ability or social support system  Taken 7/21/2023 1032 by Milla Arteaga RN  Discharge to home or other facility with appropriate resources:   Identify barriers to discharge with patient and caregiver   Arrange for needed discharge resources and transportation as appropriate   Identify discharge learning needs (meds, wound care, etc)     Problem: Safety - Adult  Goal: Free from fall injury  7/22/2023 0250 by Veronica Osei RN  Outcome: Progressing  7/21/2023 1618 by Ariel Travis RN  Outcome: Progressing  Flowsheets (Taken 7/21/2023 1618)  Free From Fall Injury:   Instruct family/caregiver on patient safety   Based on caregiver fall risk screen, instruct family/caregiver to ask for assistance with transferring infant if caregiver noted to have fall risk factors     Problem: Progressing  Flowsheets (Taken 7/21/2023 1618)  Skin Integrity Remains Intact:   Assess vascular access sites hourly   Monitor for areas of redness and/or skin breakdown  Goal: Incisions, wounds, or drain sites healing without S/S of infection  7/22/2023 0250 by Anabela Ruth RN  Outcome: Progressing  7/21/2023 1618 by Arlene Regalado RN  Outcome: Progressing  Flowsheets (Taken 7/21/2023 1618)  Incisions, Wounds, or Drain Sites Healing Without Sign and Symptoms of Infection:   ADMISSION and DAILY: Assess and document risk factors for pressure ulcer development   TWICE DAILY: Assess and document skin integrity   TWICE DAILY: Assess and document dressing/incision, wound bed, drain sites and surrounding tissue   Implement wound care per orders     Problem: Infection - Adult  Goal: Absence of infection at discharge  7/22/2023 0250 by Anabela Ruth RN  Outcome: Progressing  7/21/2023 1618 by Arlene Regalado RN  Outcome: Progressing  Flowsheets (Taken 7/21/2023 1618)  Absence of infection at discharge:   Assess and monitor for signs and symptoms of infection   Monitor lab/diagnostic results   Monitor all insertion sites i.e., indwelling lines, tubes and drains   Administer medications as ordered   Instruct and encourage patient and family to use good hand hygiene technique  Goal: Absence of infection during hospitalization  7/22/2023 0250 by Anabela Ruth RN  Outcome: Progressing  7/21/2023 1618 by Arlene Regalado RN  Outcome: Progressing  Flowsheets (Taken 7/21/2023 1618)  Absence of infection during hospitalization:   Assess and monitor for signs and symptoms of infection   Monitor lab/diagnostic results   Monitor all insertion sites i.e., indwelling lines, tubes and drains   Administer medications as ordered   Instruct and encourage patient and family to use good hand hygiene technique     Problem: Metabolic/Fluid and Electrolytes - Adult  Goal: Electrolytes maintained within normal limits  7/22/2023 0250 by

## 2023-07-23 LAB
EKG ATRIAL RATE: 68 BPM
EKG P AXIS: 68 DEGREES
EKG P-R INTERVAL: 198 MS
EKG Q-T INTERVAL: 402 MS
EKG QRS DURATION: 92 MS
EKG QTC CALCULATION (BAZETT): 427 MS
EKG R AXIS: 14 DEGREES
EKG T AXIS: 90 DEGREES
EKG VENTRICULAR RATE: 68 BPM

## 2023-07-23 PROCEDURE — 93010 ELECTROCARDIOGRAM REPORT: CPT | Performed by: INTERNAL MEDICINE

## 2023-07-24 ENCOUNTER — TELEPHONE (OUTPATIENT)
Dept: CARDIOLOGY CLINIC | Age: 78
End: 2023-07-24

## 2023-07-24 DIAGNOSIS — Z98.890 S/P CARDIAC CATH: ICD-10-CM

## 2023-07-24 LAB
EKG ATRIAL RATE: 58 BPM
EKG ATRIAL RATE: 68 BPM
EKG P AXIS: 67 DEGREES
EKG P AXIS: 68 DEGREES
EKG P-R INTERVAL: 198 MS
EKG P-R INTERVAL: 210 MS
EKG Q-T INTERVAL: 402 MS
EKG Q-T INTERVAL: 434 MS
EKG QRS DURATION: 92 MS
EKG QRS DURATION: 94 MS
EKG QTC CALCULATION (BAZETT): 426 MS
EKG QTC CALCULATION (BAZETT): 427 MS
EKG R AXIS: 14 DEGREES
EKG R AXIS: 23 DEGREES
EKG T AXIS: 87 DEGREES
EKG T AXIS: 90 DEGREES
EKG VENTRICULAR RATE: 58 BPM
EKG VENTRICULAR RATE: 68 BPM

## 2023-07-24 NOTE — TELEPHONE ENCOUNTER
Fredi Da Silva can you please print the Rx for  oxycodone so we can fax it ? We can not send this electronically?

## 2023-07-24 NOTE — DISCHARGE SUMMARY
artery. 2.  Left anterior descending artery is totally occluded. 3.  Left circumflex artery has diffuse mild disease. 4.  Right coronary artery has mild nonobstructive luminal irregularity  with some distal disease all the way at the end of the artery. BYPASS VISUALIZATION:  1. SVG to diagonal is patent, however, it is a very small native artery  and relatively smaller bypass. 2.  LIMA to LAD is patent with good anastomosis. CONCLUSION:  1. Native vessel disease as above with occluded LAD. 2.  Patent grafts to the LAD and diagonal.  3.  Disease in the left main artery which feeds the circ. The left  circumflex artery that has no bypass. 4.  Patent right coronary artery. RECOMMENDATIONS:  At this point, case was discussed with interventional  team and the family. Plan is to consider intervention and stenting of  the left main to the left circumflex artery has a likely cause of the  patient's angina given the stenosis in that area. This will be  reevaluated and dictated by Dr. Felix Wayne with interventional today. Swetha Lutz M.D.     D: 07/21/2023       SUMMARY:  Successful PCI of left main to left circumflex with two  drug-eluting stents optimized with IVUS guidance. PLAN:  1. Bedrest.  2.  Optimal medical therapy. 3.  Risk factor management. 4.  Routine access site care. 5. DAPT. 6.  IV fluids. 7.  Overnight inpatient observation and admission. 8.  Guideline-directed therapy for CAD. 9. Uptitrate antianginals. 10.  Follow up with Dr. Bob Streeter in two to four weeks postprocedure. All the above was explained to the patient and patient's family. They  were agreeable and amenable to the above plan. Hugh Swartz MD     D: 07/21/2023     Lab Data:    Cardiac Enzymes:  No results for input(s): CKTOTAL, CKMB, CKMBINDEX, TROPONINI in the last 72 hours.     CBC:   Lab Results   Component Value Date/Time    WBC 6.9 07/22/2023 05:46 AM    RBC 3.95 07/22/2023 05:46 AM

## 2023-07-24 NOTE — PROGRESS NOTES
Inpatient Cardiac Rehabilitation Consult    Received consult for Phase II Cardiac Rehabilitation. Patient needs cardiac rehab due to PCI on 7/21/23. Called patient at home and spoke with Parag Sierra, his partner. She stated she did not think he would be willing to do cardiac rehab. She will pass along the information, and they will call back if they would like the referral sent to a facility closer to home.

## 2023-07-26 RX ORDER — OXYCODONE HYDROCHLORIDE AND ACETAMINOPHEN 5; 325 MG/1; MG/1
1 TABLET ORAL EVERY 6 HOURS PRN
Qty: 12 TABLET | Refills: 0 | Status: CANCELLED | OUTPATIENT
Start: 2023-07-26 | End: 2023-07-29

## 2023-07-27 ENCOUNTER — TELEPHONE (OUTPATIENT)
Dept: CARDIOLOGY CLINIC | Age: 78
End: 2023-07-27

## 2023-07-27 NOTE — TELEPHONE ENCOUNTER
Stop aspirin and see how if this helps. Would hold eliquis for a couple days next if still happening. Cannot stop brilinta with recent stent.

## 2023-07-27 NOTE — TELEPHONE ENCOUNTER
LM for pt to return call. There was a script in the chart for 12 tablets 7/22/23. Did he get that script?

## 2023-08-04 NOTE — PROGRESS NOTES
[facility] PROFESSIONAL SERVICES  [facility] [address] [address]   Dept: [phone]   Dept Fax: [phone]   Loc: [phone]      Chief Complaint   Patient presents with    2 Week Follow-Up     Cardiologist:  Dr. Anita Dias  69 yo  male presents for f/u of LHC, s/p PCI of LM to Lcx. Hx of CABG after failed PTCA not long ago. Afib, tremor, HTN, HLD. Has been very SOB lately. For the last 9 months. Seems like since starting amiodarone at time of CABg. Brilinta just started recently with PCI. Some purple color of his feet/legs. No swelling or pain at all. Patient very limited by his SOB. Would like some adjustmetns for meds as appropraite.      General:   No fever, no chills, no weight loss, no fatigue  Pulmonary:    No dyspnea, no wheezing  Cardiac:    Denies recent chest pain   GI:     No nausea or vomiting, no abdominal pain  Neuro:     No dizziness or light headedness  Musculoskeletal:  No recent active issues  Extremities:   No edema      Past Medical History:   Diagnosis Date    Anxiety     Bradycardia 04/08/2016    Heart disease     Hyperlipidemia     Hypertension     Medtronic linq loop recorder 04/08/2016    Neuropathy     Paroxysmal atrial fibrillation (HCC) 09/23/2015    Seizures (HCC)     Spondylolisthesis     Syncopal episodes 09/23/2015       Allergies   Allergen Reactions    Adhesive Tape Rash       Current Outpatient Medications   Medication Sig Dispense Refill    atorvastatin (LIPITOR) 80 MG tablet Take 1 tablet by mouth nightly 30 tablet 3    ticagrelor (BRILINTA) 90 MG TABS tablet Take 1 tablet by mouth 2 times daily 60 tablet 3    irbesartan-hydroCHLOROthiazide (AVALIDE) 150-12.5 MG per tablet Take 1 tablet by mouth once daily 90 tablet 3    apixaban (ELIQUIS) 5 MG TABS tablet Take 1 tablet by mouth 2 times daily 180 tablet 3    tiZANidine (ZANAFLEX) 4 MG tablet Take 1 tablet by mouth every 6 hours as needed      zonisamide (ZONEGRAN) 100 MG

## 2023-08-09 ENCOUNTER — TELEPHONE (OUTPATIENT)
Dept: CARDIOLOGY CLINIC | Age: 78
End: 2023-08-09

## 2023-08-09 ENCOUNTER — OFFICE VISIT (OUTPATIENT)
Dept: CARDIOLOGY CLINIC | Age: 78
End: 2023-08-09

## 2023-08-09 VITALS
HEART RATE: 67 BPM | DIASTOLIC BLOOD PRESSURE: 52 MMHG | BODY MASS INDEX: 37.32 KG/M2 | WEIGHT: 232.2 LBS | HEIGHT: 66 IN | SYSTOLIC BLOOD PRESSURE: 88 MMHG

## 2023-08-09 DIAGNOSIS — I25.10 CAD IN NATIVE ARTERY: Primary | ICD-10-CM

## 2023-08-09 DIAGNOSIS — I48.0 PAROXYSMAL ATRIAL FIBRILLATION (HCC): ICD-10-CM

## 2023-08-09 DIAGNOSIS — I10 PRIMARY HYPERTENSION: ICD-10-CM

## 2023-08-09 DIAGNOSIS — R06.02 SHORTNESS OF BREATH: ICD-10-CM

## 2023-08-09 NOTE — TELEPHONE ENCOUNTER
Pt was in to see Nadiya Hernandez today   Still very SOB and dizzy   Pt and wife states they think it is from the amiodarone- they think all these issues started with he started the amio    Please advise if we can change to something else   CABG in 2022

## 2023-08-09 NOTE — PROGRESS NOTES
Patient here for a 2 week follow up. Patients wife wants to discuss med list because she thinks pt sob is due to meds     EKG done 7/24/2023    C/o chest pain, constant sob gets it when he is sleeping too and light headedness. States when he bends over he cant get air and when he lays down he cant breathe he has to sit up.

## 2023-08-10 NOTE — TELEPHONE ENCOUNTER
Pt spouse notified and voiced understanding. Pt spouse also asking about yudi note from yesterday. What you recommend. Per yudi note  Orthostatic hypotension/HTN--stop metoprolol and irbesartan for now given very low BP and SOB. PAF, ectopy--need to consider alternative to [employer] [employer]. - [facility] given possible incidence of symptoms startinga round the same time as his amiodarone was initiated, last year. Flecainide not a good option given CAD. Will discuss with Indu Alexandre. Also had epistaxis recently for couple days. Gradually stopped. Continiue eliquis and brilinta for now.

## 2023-08-17 RX ORDER — POTASSIUM CHLORIDE 20 MEQ/1
TABLET, EXTENDED RELEASE ORAL
Qty: 180 TABLET | Refills: 3 | Status: SHIPPED | OUTPATIENT
Start: 2023-08-17

## 2023-08-21 ENCOUNTER — OFFICE VISIT (OUTPATIENT)
Dept: NEUROLOGY | Age: 78
End: 2023-08-21
Payer: MEDICARE

## 2023-08-21 ENCOUNTER — TELEPHONE (OUTPATIENT)
Dept: CARDIOLOGY CLINIC | Age: 78
End: 2023-08-21

## 2023-08-21 VITALS
HEIGHT: 66 IN | SYSTOLIC BLOOD PRESSURE: 110 MMHG | HEART RATE: 83 BPM | BODY MASS INDEX: 37.12 KG/M2 | WEIGHT: 231 LBS | DIASTOLIC BLOOD PRESSURE: 60 MMHG

## 2023-08-21 DIAGNOSIS — R56.9 SEIZURE (HCC): ICD-10-CM

## 2023-08-21 DIAGNOSIS — R25.9 MIXED ACTION AND RESTING TREMOR: Primary | ICD-10-CM

## 2023-08-21 DIAGNOSIS — R55 SYNCOPE AND COLLAPSE: ICD-10-CM

## 2023-08-21 PROCEDURE — 1123F ACP DISCUSS/DSCN MKR DOCD: CPT | Performed by: NURSE PRACTITIONER

## 2023-08-21 PROCEDURE — 99214 OFFICE O/P EST MOD 30 MIN: CPT | Performed by: NURSE PRACTITIONER

## 2023-08-21 PROCEDURE — 3078F DIAST BP <80 MM HG: CPT | Performed by: NURSE PRACTITIONER

## 2023-08-21 PROCEDURE — 3074F SYST BP LT 130 MM HG: CPT | Performed by: NURSE PRACTITIONER

## 2023-08-21 NOTE — TELEPHONE ENCOUNTER
Pt wife called saying pt BP has been high   136/90  142/83 67  151/92  137/65  118/83  114/68  114/58  109/71  117/83  135/79  151/92  136/90  142/83  States it is gradually getting higher since he has been off his medications      Instructions    Do not take irbesartan or metoprolol for 5 days. Keep track of blood pressure. Wife asking if he needs to go back on any  medications?

## 2023-08-21 NOTE — PROGRESS NOTES
NEUROLOGY OUT PATIENT FOLLOW UP NOTE:  8/21/20233:03 PM    Marisela Barahona is here for follow up for syncope, mixed tremor, seizure             Allergies   Allergen Reactions    Adhesive Tape Rash       Current Outpatient Medications:     potassium chloride (KLOR-CON M) 20 MEQ extended release tablet, Take 1 tablet by mouth twice daily, Disp: 180 tablet, Rfl: 3    atorvastatin (LIPITOR) 80 MG tablet, Take 1 tablet by mouth nightly, Disp: 30 tablet, Rfl: 3    ticagrelor (BRILINTA) 90 MG TABS tablet, Take 1 tablet by mouth 2 times daily, Disp: 60 tablet, Rfl: 3    apixaban (ELIQUIS) 5 MG TABS tablet, Take 1 tablet by mouth 2 times daily, Disp: 180 tablet, Rfl: 3    tiZANidine (ZANAFLEX) 4 MG tablet, Take 1 tablet by mouth every 6 hours as needed, Disp: , Rfl:     zonisamide (ZONEGRAN) 100 MG capsule, Take 2 capsules by mouth daily Take with plenty of fluids. (Patient taking differently: Take 1 capsule by mouth in the morning and at bedtime Take with plenty of fluids.), Disp: 180 capsule, Rfl: 3    rOPINIRole (REQUIP) 0.5 MG tablet, TAKE ONE TABLET BY MOUTH 3 TIMES DAILY, Disp: 270 tablet, Rfl: 1    DULoxetine (CYMBALTA) 60 MG extended release capsule, Take 1 capsule by mouth daily, Disp: , Rfl:     Cholecalciferol (VITAMIN D3) 25 MCG (1000 UT) CAPS, Take by mouth, Disp: , Rfl:     bethanechol (URECHOLINE) 25 MG tablet, Take 1 tablet by mouth 3 times daily, Disp: , Rfl:     metroNIDAZOLE (METROGEL) 1 % gel, Apply topically daily Apply topically daily. , Disp: , Rfl:     nitroGLYCERIN (NITROSTAT) 0.4 MG SL tablet, Place 1 tablet under the tongue every 5 minutes as needed for Chest pain up to max of 3 total doses.  If no relief after 1 dose, call 911., Disp: 25 tablet, Rfl: 3    vitamin B-12 1000 MCG tablet, Take 1 tablet by mouth daily, Disp: 30 tablet, Rfl: 0    pantoprazole sodium (PROTONIX) 40 MG PACK packet, Take 1 packet by mouth every morning (before breakfast), Disp: , Rfl:     pregabalin (LYRICA) 50 MG capsule,

## 2023-08-21 NOTE — PATIENT INSTRUCTIONS
Continue with Zonegran  200 mg daily. Refills given  Continue Requip 0.5 mg three times a day thereafter. Refills given   No driving, swimming, operating heavy machinery or compromising heights until event free for 6 months. Report any new events. Call if any questions.   Continue following with cardiology re: dizziness  Proceed with pulmonology evaluation as scheduled  Call with any new symptoms or concerns  Follow up in 2 months or sooner if needed

## 2023-10-23 ENCOUNTER — OFFICE VISIT (OUTPATIENT)
Dept: NEUROLOGY | Age: 78
End: 2023-10-23
Payer: MEDICARE

## 2023-10-23 VITALS
OXYGEN SATURATION: 97 % | HEIGHT: 66 IN | WEIGHT: 233 LBS | SYSTOLIC BLOOD PRESSURE: 132 MMHG | HEART RATE: 77 BPM | DIASTOLIC BLOOD PRESSURE: 61 MMHG | BODY MASS INDEX: 37.45 KG/M2

## 2023-10-23 DIAGNOSIS — R42 DIZZINESS: ICD-10-CM

## 2023-10-23 DIAGNOSIS — R56.9 SEIZURE (HCC): ICD-10-CM

## 2023-10-23 DIAGNOSIS — R25.9 MIXED ACTION AND RESTING TREMOR: Primary | ICD-10-CM

## 2023-10-23 DIAGNOSIS — R25.1 TREMOR: ICD-10-CM

## 2023-10-23 PROCEDURE — 3078F DIAST BP <80 MM HG: CPT | Performed by: PSYCHIATRY & NEUROLOGY

## 2023-10-23 PROCEDURE — 3074F SYST BP LT 130 MM HG: CPT | Performed by: PSYCHIATRY & NEUROLOGY

## 2023-10-23 PROCEDURE — 1123F ACP DISCUSS/DSCN MKR DOCD: CPT | Performed by: PSYCHIATRY & NEUROLOGY

## 2023-10-23 PROCEDURE — 99214 OFFICE O/P EST MOD 30 MIN: CPT | Performed by: PSYCHIATRY & NEUROLOGY

## 2023-10-23 RX ORDER — ISOSORBIDE MONONITRATE 30 MG/1
30 TABLET, EXTENDED RELEASE ORAL DAILY
COMMUNITY

## 2023-10-23 RX ORDER — CLOPIDOGREL BISULFATE 75 MG/1
75 TABLET ORAL DAILY
COMMUNITY

## 2023-10-23 NOTE — PROGRESS NOTES
NEUROLOGY OUT PATIENT FOLLOW UP NOTE:  10/23/53667:47 PM    Gayle Nguyen is here for follow up for syncope, mixed tremor, seizure. Allergies   Allergen Reactions    Adhesive Tape Rash       Current Outpatient Medications:     isosorbide mononitrate (IMDUR) 30 MG extended release tablet, Take 1 tablet by mouth daily, Disp: , Rfl:     clopidogrel (PLAVIX) 75 MG tablet, Take 1 tablet by mouth daily, Disp: , Rfl:     metoprolol succinate (TOPROL XL) 25 MG extended release tablet, Take 1 tablet by mouth daily, Disp: 30 tablet, Rfl: 1    finasteride (PROSCAR) 5 MG tablet, Take 1 tablet by mouth Every Day, Disp: , Rfl:     potassium chloride (KLOR-CON M) 20 MEQ extended release tablet, Take 1 tablet by mouth twice daily, Disp: 180 tablet, Rfl: 3    atorvastatin (LIPITOR) 80 MG tablet, Take 1 tablet by mouth nightly, Disp: 30 tablet, Rfl: 3    apixaban (ELIQUIS) 5 MG TABS tablet, Take 1 tablet by mouth 2 times daily, Disp: 180 tablet, Rfl: 3    tiZANidine (ZANAFLEX) 4 MG tablet, Take 1 tablet by mouth every 6 hours as needed, Disp: , Rfl:     zonisamide (ZONEGRAN) 100 MG capsule, Take 2 capsules by mouth daily Take with plenty of fluids. (Patient taking differently: Take 1 capsule by mouth in the morning and at bedtime Take with plenty of fluids.), Disp: 180 capsule, Rfl: 3    rOPINIRole (REQUIP) 0.5 MG tablet, TAKE ONE TABLET BY MOUTH 3 TIMES DAILY, Disp: 270 tablet, Rfl: 1    DULoxetine (CYMBALTA) 60 MG extended release capsule, Take 1 capsule by mouth daily, Disp: , Rfl:     Cholecalciferol (VITAMIN D3) 25 MCG (1000 UT) CAPS, Take by mouth, Disp: , Rfl:     bethanechol (URECHOLINE) 25 MG tablet, Take 1 tablet by mouth 2 times daily, Disp: , Rfl:     metroNIDAZOLE (METROGEL) 1 % gel, Apply topically daily Apply topically daily. , Disp: , Rfl:     nitroGLYCERIN (NITROSTAT) 0.4 MG SL tablet, Place 1 tablet under the tongue every 5 minutes as needed for Chest pain up to max of 3 total doses.  If no relief after 1

## 2023-10-23 NOTE — PATIENT INSTRUCTIONS
Continue with Zonegran  200 mg daily. Refills given  Continue Requip 0.5 mg three times a day thereafter. Refills given   No driving, swimming, operating heavy machinery or compromising heights until event free for 6 months. Report any new events. Call if any questions.   Continue following with cardiology re: dizziness  Pulmonology evaluation as scheduled  Call with any new symptoms or concerns  Follow up in 6 months or sooner if needed

## 2023-10-31 RX ORDER — ATORVASTATIN CALCIUM 80 MG/1
80 TABLET, FILM COATED ORAL NIGHTLY
Qty: 90 TABLET | Refills: 1 | Status: SHIPPED | OUTPATIENT
Start: 2023-10-31

## 2023-10-31 RX ORDER — METOPROLOL SUCCINATE 25 MG/1
25 TABLET, EXTENDED RELEASE ORAL DAILY
Qty: 90 TABLET | Refills: 1 | Status: SHIPPED | OUTPATIENT
Start: 2023-10-31

## 2023-10-31 NOTE — TELEPHONE ENCOUNTER
Omari Mar called requesting a refill on the following medications:  Requested Prescriptions     Pending Prescriptions Disp Refills    metoprolol succinate (TOPROL XL) 25 MG extended release tablet 30 tablet 1     Sig: Take 1 tablet by mouth daily    atorvastatin (LIPITOR) 80 MG tablet 30 tablet 3     Sig: Take 1 tablet by mouth nightly     Pharmacy verified: [id number] in [facility]  . pv    CAN THESE BE SWITCHED TO 90 DAY SUPPLIES?     Date of last visit: 8/9/2023  Date of next visit (if applicable): 4/97/7462

## 2023-11-14 ENCOUNTER — TELEPHONE (OUTPATIENT)
Dept: NEUROLOGY | Age: 78
End: 2023-11-14

## 2023-11-14 NOTE — TELEPHONE ENCOUNTER
Wife, on HIPAA, called stating she was told at last visit to measure blood sugars and report readings. Per wife, blood sugars in am before eating were:  137, 90, 121, 103, 146. I do not see in last note where the provider made this request. Wife is calling with update. Patient is also taking Zonegran 200 mg heath and Requip 0.5 mg three times a day. Patient is happy with his current medication regimen at this time.

## 2023-12-06 RX ORDER — NITROGLYCERIN 0.4 MG/1
0.4 TABLET SUBLINGUAL EVERY 5 MIN PRN
Qty: 25 TABLET | Refills: 3 | Status: SHIPPED | OUTPATIENT
Start: 2023-12-06

## 2023-12-06 NOTE — TELEPHONE ENCOUNTER
Hiral Talia called requesting a refill on the following medications:  Requested Prescriptions     Pending Prescriptions Disp Refills    nitroGLYCERIN (NITROSTAT) 0.4 MG SL tablet 25 tablet 3     Sig: Place 1 tablet under the tongue every 5 minutes as needed for Chest pain up to max of 3 total doses. If no relief after 1 dose, call 911. Pharmacy verified: [facility] in [place], [place]  . pv      Date of last visit: 8/09/2023  Date of next visit (if applicable): 9/72/2378

## 2024-03-26 DIAGNOSIS — R25.9 MIXED ACTION AND RESTING TREMOR: ICD-10-CM

## 2024-03-26 RX ORDER — ROPINIROLE 0.5 MG/1
TABLET, FILM COATED ORAL
Qty: 270 TABLET | Refills: 0 | Status: SHIPPED | OUTPATIENT
Start: 2024-03-26

## 2024-04-26 ENCOUNTER — OFFICE VISIT (OUTPATIENT)
Dept: NEUROLOGY | Age: 79
End: 2024-04-26
Payer: MEDICARE

## 2024-04-26 VITALS
BODY MASS INDEX: 37.83 KG/M2 | SYSTOLIC BLOOD PRESSURE: 138 MMHG | DIASTOLIC BLOOD PRESSURE: 86 MMHG | HEART RATE: 75 BPM | OXYGEN SATURATION: 98 % | HEIGHT: 66 IN | WEIGHT: 235.4 LBS

## 2024-04-26 DIAGNOSIS — R56.9 SEIZURE (HCC): ICD-10-CM

## 2024-04-26 DIAGNOSIS — R55 SYNCOPE AND COLLAPSE: ICD-10-CM

## 2024-04-26 DIAGNOSIS — R25.9 MIXED ACTION AND RESTING TREMOR: Primary | ICD-10-CM

## 2024-04-26 DIAGNOSIS — R42 DIZZINESS: ICD-10-CM

## 2024-04-26 PROCEDURE — 1123F ACP DISCUSS/DSCN MKR DOCD: CPT | Performed by: NURSE PRACTITIONER

## 2024-04-26 PROCEDURE — 3075F SYST BP GE 130 - 139MM HG: CPT | Performed by: NURSE PRACTITIONER

## 2024-04-26 PROCEDURE — 99214 OFFICE O/P EST MOD 30 MIN: CPT | Performed by: NURSE PRACTITIONER

## 2024-04-26 PROCEDURE — 3079F DIAST BP 80-89 MM HG: CPT | Performed by: NURSE PRACTITIONER

## 2024-04-26 RX ORDER — ZONISAMIDE 100 MG/1
200 CAPSULE ORAL DAILY
Qty: 180 CAPSULE | Refills: 3 | Status: SHIPPED | OUTPATIENT
Start: 2024-04-26

## 2024-04-26 NOTE — PROGRESS NOTES
NEUROLOGY OUT PATIENT FOLLOW UP NOTE:  4/26/20242:15 PM    Gutierrez Dumont is here for follow up for syncope, mixed tremor, seizure.             Allergies   Allergen Reactions    Adhesive Tape Rash       Current Outpatient Medications:     rOPINIRole (REQUIP) 0.5 MG tablet, TAKE 1 TABLET BY MOUTH THREE TIMES DAILY, Disp: 270 tablet, Rfl: 0    nitroGLYCERIN (NITROSTAT) 0.4 MG SL tablet, Place 1 tablet under the tongue every 5 minutes as needed for Chest pain up to max of 3 total doses. If no relief after 1 dose, call 911., Disp: 25 tablet, Rfl: 3    metoprolol succinate (TOPROL XL) 25 MG extended release tablet, Take 1 tablet by mouth daily (Patient taking differently: Take 1 tablet by mouth 2 times daily), Disp: 90 tablet, Rfl: 1    atorvastatin (LIPITOR) 80 MG tablet, Take 1 tablet by mouth nightly, Disp: 90 tablet, Rfl: 1    isosorbide mononitrate (IMDUR) 30 MG extended release tablet, Take 1 tablet by mouth daily, Disp: , Rfl:     clopidogrel (PLAVIX) 75 MG tablet, Take 1 tablet by mouth daily, Disp: , Rfl:     finasteride (PROSCAR) 5 MG tablet, Take 1 tablet by mouth Every Day, Disp: , Rfl:     potassium chloride (KLOR-CON M) 20 MEQ extended release tablet, Take 1 tablet by mouth twice daily, Disp: 180 tablet, Rfl: 3    apixaban (ELIQUIS) 5 MG TABS tablet, Take 1 tablet by mouth 2 times daily, Disp: 180 tablet, Rfl: 3    tiZANidine (ZANAFLEX) 4 MG tablet, Take 1 tablet by mouth every 6 hours as needed, Disp: , Rfl:     zonisamide (ZONEGRAN) 100 MG capsule, Take 2 capsules by mouth daily Take with plenty of fluids. (Patient taking differently: Take 1 capsule by mouth in the morning and at bedtime Take with plenty of fluids.), Disp: 180 capsule, Rfl: 3    DULoxetine (CYMBALTA) 60 MG extended release capsule, Take 1 capsule by mouth daily, Disp: , Rfl:     Cholecalciferol (VITAMIN D3) 25 MCG (1000 UT) CAPS, Take by mouth, Disp: , Rfl:     bethanechol (URECHOLINE) 25 MG tablet, Take 1 tablet by mouth 2 times

## 2024-04-26 NOTE — PATIENT INSTRUCTIONS
Continue with Zonegran  200 mg daily. Refills given  Continue Requip 0.5 mg three times a day thereafter. Refills given   No driving, swimming, operating heavy machinery or compromising heights until event free for 6 months. Report any new events. Call if any questions.  Continue following with cardiology re: dizziness  You need to use your cane at all times as discussed.  Call with any new symptoms or concerns  Follow up in 12 months or sooner if needed

## 2024-06-25 DIAGNOSIS — R25.9 MIXED ACTION AND RESTING TREMOR: ICD-10-CM

## 2024-06-25 RX ORDER — POTASSIUM CHLORIDE 1500 MG/1
TABLET, EXTENDED RELEASE ORAL
Qty: 180 TABLET | Refills: 0 | OUTPATIENT
Start: 2024-06-25

## 2024-06-25 RX ORDER — ROPINIROLE 0.5 MG/1
TABLET, FILM COATED ORAL
Qty: 270 TABLET | Refills: 2 | Status: SHIPPED | OUTPATIENT
Start: 2024-06-25

## 2024-06-25 NOTE — TELEPHONE ENCOUNTER
Gutierrez Dumont called requesting a refill on the following medications:  Requested Prescriptions     Pending Prescriptions Disp Refills    rOPINIRole (REQUIP) 0.5 MG tablet 270 tablet 0     Sig: TAKE 1 TABLET BY MOUTH THREE TIMES DAILY       Date of last visit: 4/26/2024- Paige Leopold, CNP  Date of next visit 4/28/24- Paige Leopold, CNP  Date of last refill: 3/26/24  [facility] Name: [facility]

## 2024-07-04 NOTE — TELEPHONE ENCOUNTER
Gutierrez Dumont called requesting a refill on the following medications:  Requested Prescriptions     Pending Prescriptions Disp Refills    rOPINIRole (REQUIP) 0.5 MG tablet [Pharmacy Med Name: rOPINIRole HCl 0.5 MG Oral Tablet] 270 tablet 0     Sig: TAKE 1 TABLET BY MOUTH THREE TIMES DAILY       Date of last visit: 10/23/2023- Dr. Boudreaux  Date of next visit (if applicable):4/26/24- Paige Leopold, CNP  Date of last refill: 6/19/23  Pharmacy Name: [facility] [place]      Thanks,  Gaby Fan LPN     family

## 2024-12-12 DIAGNOSIS — R25.9 MIXED ACTION AND RESTING TREMOR: ICD-10-CM

## 2024-12-13 RX ORDER — ROPINIROLE 0.5 MG/1
TABLET, FILM COATED ORAL
Qty: 270 TABLET | Refills: 2 | Status: SHIPPED | OUTPATIENT
Start: 2024-12-13

## 2024-12-13 NOTE — TELEPHONE ENCOUNTER
Gutierrez Dumont called requesting a refill on the following medications:  Requested Prescriptions     Pending Prescriptions Disp Refills    rOPINIRole (REQUIP) 0.5 MG tablet [Pharmacy Med Name: rOPINIRole HCl 0.5 MG Oral Tablet] 270 tablet 0     Sig: TAKE 1 TABLET BY MOUTH THREE TIMES DAILY       Date of last visit: 4/26/2024  Date of next visit (if applicable):4/28/25  Date of last refill: 6/25/24  [facility] Name: [facility]

## 2025-04-28 ENCOUNTER — OFFICE VISIT (OUTPATIENT)
Dept: NEUROLOGY | Age: 80
End: 2025-04-28
Payer: MEDICARE

## 2025-04-28 VITALS
OXYGEN SATURATION: 98 % | DIASTOLIC BLOOD PRESSURE: 85 MMHG | BODY MASS INDEX: 38.41 KG/M2 | WEIGHT: 239 LBS | HEART RATE: 78 BPM | SYSTOLIC BLOOD PRESSURE: 135 MMHG | HEIGHT: 66 IN

## 2025-04-28 DIAGNOSIS — R42 DIZZINESS: ICD-10-CM

## 2025-04-28 DIAGNOSIS — R25.8 BRADYKINESIA: ICD-10-CM

## 2025-04-28 DIAGNOSIS — R56.9 SEIZURE (HCC): ICD-10-CM

## 2025-04-28 DIAGNOSIS — R55 SYNCOPE AND COLLAPSE: ICD-10-CM

## 2025-04-28 DIAGNOSIS — R29.6 FREQUENT FALLS: ICD-10-CM

## 2025-04-28 DIAGNOSIS — R26.89 BALANCE PROBLEM: ICD-10-CM

## 2025-04-28 DIAGNOSIS — R25.9 MIXED ACTION AND RESTING TREMOR: Primary | ICD-10-CM

## 2025-04-28 PROCEDURE — 1123F ACP DISCUSS/DSCN MKR DOCD: CPT | Performed by: NURSE PRACTITIONER

## 2025-04-28 PROCEDURE — 1159F MED LIST DOCD IN RCRD: CPT | Performed by: NURSE PRACTITIONER

## 2025-04-28 PROCEDURE — 3079F DIAST BP 80-89 MM HG: CPT | Performed by: NURSE PRACTITIONER

## 2025-04-28 PROCEDURE — 99214 OFFICE O/P EST MOD 30 MIN: CPT | Performed by: NURSE PRACTITIONER

## 2025-04-28 PROCEDURE — 3075F SYST BP GE 130 - 139MM HG: CPT | Performed by: NURSE PRACTITIONER

## 2025-04-28 RX ORDER — ZONISAMIDE 100 MG/1
200 CAPSULE ORAL DAILY
Qty: 180 CAPSULE | Refills: 3 | Status: SHIPPED | OUTPATIENT
Start: 2025-04-28

## 2025-04-28 RX ORDER — RANOLAZINE 500 MG/1
500 TABLET, EXTENDED RELEASE ORAL 2 TIMES DAILY
COMMUNITY

## 2025-04-28 RX ORDER — CARBIDOPA AND LEVODOPA 25; 100 MG/1; MG/1
1 TABLET ORAL 3 TIMES DAILY
Qty: 90 TABLET | Refills: 3 | Status: SHIPPED | OUTPATIENT
Start: 2025-04-28

## 2025-04-28 NOTE — PROGRESS NOTES
maxillary  sinuses.  Clear mastoid air cells.  No calvarial or scalp lesion.    Impression  Impression:  1. Age-related intracranial exam without acute abnormality, or mass  lesion.  2. Chronic sinusitis.    This document has been electronically signed by: Alex Floyd MD on  11/22/2022 10:00 PM    No results found for this or any previous visit.    Results for orders placed during the hospital encounter of 11/21/22    CT HEAD WO CONTRAST    Narrative  CT head without contrast    Comparison: None    Findings:  No intracranial mass, midline shift, hydrocephalus, or acute hemorrhage.    Minimal changes of chronic microvascular ischemic disease and age-related  global volume loss.    Minimal mucosal thickening of the bilateral maxillary sinuses and ethmoid  sinuses.    The orbits are within normal limits.    No acute fracture.    Impression  1. No acute intracranial process.  2. Minimal changes of chronic microvascular ischemic disease and  age-related global volume loss.    This document has been electronically signed by: Markie Acuña DO on  11/21/2022 05:58 AM    All CTs at this facility use dose modulation techniques and iterative  reconstructions, and/or weight-based dosing  when appropriate to reduce radiation to a low as reasonably achievable.         Assessment:     Diagnosis Orders   1. Mixed action and resting tremor  carbidopa-levodopa (SINEMET)  MG per tablet    External Referral To Physical Therapy    zonisamide (ZONEGRAN) 100 MG capsule      2. Bradykinesia        3. Dizziness  carbidopa-levodopa (SINEMET)  MG per tablet    External Referral To Physical Therapy      4. Frequent falls        5. Balance problem        6. Seizure (HCC)  carbidopa-levodopa (SINEMET)  MG per tablet    External Referral To Physical Therapy    zonisamide (ZONEGRAN) 100 MG capsule      7. Syncope and collapse  zonisamide (ZONEGRAN) 100 MG capsule           Follow up for syncope and collapse, seizure, tremor.

## 2025-04-28 NOTE — PATIENT INSTRUCTIONS
Start Sinemet 25/100 mg three times a day, take at 10am, 3 pm and 8 pm daily  Referral to physical therapy re: balance issues, frequent falls.   Continue with Zonegran  200 mg daily. Refills given  Continue Requip 0.5 mg three times a day thereafter. Refills given   No driving, swimming, operating heavy machinery or compromising heights until event free for 6 months. Report any new events. Call if any questions.  Continue following with cardiology re: dizziness  You need to use your cane at all times as discussed.  Call with any new symptoms or concerns  Follow up in 6 weeks or sooner if needed with Dr. Boudreaux

## 2025-06-16 ENCOUNTER — OFFICE VISIT (OUTPATIENT)
Dept: NEUROLOGY | Age: 80
End: 2025-06-16
Payer: MEDICARE

## 2025-06-16 VITALS
DIASTOLIC BLOOD PRESSURE: 74 MMHG | HEIGHT: 66 IN | HEART RATE: 71 BPM | SYSTOLIC BLOOD PRESSURE: 152 MMHG | WEIGHT: 237.5 LBS | BODY MASS INDEX: 38.17 KG/M2

## 2025-06-16 DIAGNOSIS — R42 DIZZINESS: ICD-10-CM

## 2025-06-16 DIAGNOSIS — R56.9 SEIZURE (HCC): ICD-10-CM

## 2025-06-16 DIAGNOSIS — R29.6 FREQUENT FALLS: ICD-10-CM

## 2025-06-16 DIAGNOSIS — R25.9 MIXED ACTION AND RESTING TREMOR: ICD-10-CM

## 2025-06-16 DIAGNOSIS — G20.A2 PARKINSON'S DISEASE WITHOUT DYSKINESIA, WITH FLUCTUATING MANIFESTATIONS (HCC): Primary | ICD-10-CM

## 2025-06-16 DIAGNOSIS — R55 SYNCOPE AND COLLAPSE: ICD-10-CM

## 2025-06-16 DIAGNOSIS — R25.8 BRADYKINESIA: ICD-10-CM

## 2025-06-16 PROCEDURE — 1123F ACP DISCUSS/DSCN MKR DOCD: CPT | Performed by: PSYCHIATRY & NEUROLOGY

## 2025-06-16 PROCEDURE — 1159F MED LIST DOCD IN RCRD: CPT | Performed by: PSYCHIATRY & NEUROLOGY

## 2025-06-16 PROCEDURE — 3078F DIAST BP <80 MM HG: CPT | Performed by: PSYCHIATRY & NEUROLOGY

## 2025-06-16 PROCEDURE — 99214 OFFICE O/P EST MOD 30 MIN: CPT | Performed by: PSYCHIATRY & NEUROLOGY

## 2025-06-16 PROCEDURE — 3077F SYST BP >= 140 MM HG: CPT | Performed by: PSYCHIATRY & NEUROLOGY

## 2025-06-16 RX ORDER — CARBIDOPA AND LEVODOPA 25; 100 MG/1; MG/1
1 TABLET ORAL 3 TIMES DAILY
Qty: 270 TABLET | Refills: 3 | Status: SHIPPED | OUTPATIENT
Start: 2025-06-16

## 2025-06-16 NOTE — PATIENT INSTRUCTIONS
Continue with Sinemet 25/100 mg three times a day, take at 10am, 3 pm and 8 pm daily  Continue recommendations of physical therapy re: balance issues, frequent falls.   Continue with Zonegran  200 mg daily. Refills given  Continue Requip 0.5 mg three times a day thereafter. Refills given   No driving, swimming, operating heavy machinery or compromising heights until event free for 6 months. Report any new events. Call if any questions.  Continue following with cardiology re: dizziness  Continue to use your cane at all times as discussed.  Call with any new symptoms or concerns  Follow up in 3 months.

## 2025-06-16 NOTE — PROGRESS NOTES
NEUROLOGY OUT PATIENT FOLLOW UP NOTE:  6/16/20252:13 PM    Gutierrez Dumont is here for follow up for syncope, mixed tremor, seizure.       Allergies   Allergen Reactions    Adhesive Tape Rash       Current Outpatient Medications:     ranolazine (RANEXA) 500 MG extended release tablet, Take 1 tablet by mouth 2 times daily, Disp: , Rfl:     carbidopa-levodopa (SINEMET)  MG per tablet, Take 1 tablet by mouth 3 times daily Take at 10am, 3pm, and 8 pm daily, Disp: 90 tablet, Rfl: 3    zonisamide (ZONEGRAN) 100 MG capsule, Take 2 capsules by mouth daily Take with plenty of fluids., Disp: 180 capsule, Rfl: 3    rOPINIRole (REQUIP) 0.5 MG tablet, TAKE 1 TABLET BY MOUTH THREE TIMES DAILY, Disp: 270 tablet, Rfl: 2    nitroGLYCERIN (NITROSTAT) 0.4 MG SL tablet, Place 1 tablet under the tongue every 5 minutes as needed for Chest pain up to max of 3 total doses. If no relief after 1 dose, call 911., Disp: 25 tablet, Rfl: 3    metoprolol succinate (TOPROL XL) 25 MG extended release tablet, Take 1 tablet by mouth daily, Disp: 90 tablet, Rfl: 1    atorvastatin (LIPITOR) 80 MG tablet, Take 1 tablet by mouth nightly, Disp: 90 tablet, Rfl: 1    isosorbide mononitrate (IMDUR) 30 MG extended release tablet, Take 1 tablet by mouth daily (Patient not taking: Reported on 4/28/2025), Disp: , Rfl:     clopidogrel (PLAVIX) 75 MG tablet, Take 1 tablet by mouth daily, Disp: , Rfl:     finasteride (PROSCAR) 5 MG tablet, Take 1 tablet by mouth Every Day, Disp: , Rfl:     potassium chloride (KLOR-CON M) 20 MEQ extended release tablet, Take 1 tablet by mouth twice daily, Disp: 180 tablet, Rfl: 3    ticagrelor (BRILINTA) 90 MG TABS tablet, Take 1 tablet by mouth 2 times daily, Disp: 60 tablet, Rfl: 3    irbesartan-hydroCHLOROthiazide (AVALIDE) 150-12.5 MG per tablet, Take 1 tablet by mouth once daily, Disp: 90 tablet, Rfl: 3    apixaban (ELIQUIS) 5 MG TABS tablet, Take 1 tablet by mouth 2 times daily, Disp: 180 tablet, Rfl: 3    tiZANidine

## (undated) DEVICE — SUTURE SOFSILK SZ 2 L30IN NONABSORBABLE WHT V-26 L37MM 1/2 CS746

## (undated) DEVICE — SUTURE SOFSILK SZ 1 L30IN NABSORBABLE BLK C-17 L39MM 3/8 SS646

## (undated) DEVICE — SUTURE VCRL + SZ 0 L27IN ABSRB VLT L36MM CT-1 1/2 CIR VCPB260H

## (undated) DEVICE — KIT PROC 1 ICG VI AQ SOLVNT DRP SPY ELITE

## (undated) DEVICE — DRESSING GRMCDL 6 12FR D1N CNTR HOLE 4MM ANTMCRBL PRTCTVE DI

## (undated) DEVICE — CANNULA PERF 15FR L12.5IN RG STPCOCK WIREWOUND BODY

## (undated) DEVICE — APPLICATOR MEDICATED 26 CC SOLUTION CLR STRL CHLORAPREP

## (undated) DEVICE — SHUNT CV L14MM DIA1.75MM IC SIL BLB TIP RADPQ CLRVW
Type: IMPLANTABLE DEVICE | Status: NON-FUNCTIONAL
Removed: 2022-11-08

## (undated) DEVICE — LEAD PACE L475MM CHNL A OR V MYOCARDIAL STEROID ELUT SIL

## (undated) DEVICE — Device

## (undated) DEVICE — ADHESIVE SKIN CLSR 0.7ML TOP DERMBND ADV

## (undated) DEVICE — CANNULA PVC RCSP 15FR SLD STYL W/ HNDL OVERALL LEN 11 IN

## (undated) DEVICE — GLOVE ORANGE PI 7 1/2   MSG9075

## (undated) DEVICE — PROVE COVER: Brand: UNBRANDED

## (undated) DEVICE — CLIP LIG M TI 6 SIL HNDL FOR OPN AND ENDOSCP SGL APPL

## (undated) DEVICE — APPLIER CLP L9.38IN M LIG TI DISP STR RNG HNDL LIGACLP

## (undated) DEVICE — SUTURE PROL 3-0 36IN NONABSORB BLU 26MM SH 1/2 CIR P8522H

## (undated) DEVICE — SUTURE MCRYL SZ 4-0 L27IN ABSRB UD L19MM PS-2 1/2 CIR PRIM Y426H

## (undated) DEVICE — SUTURE VCRL + SZ 0 L36IN ABSRB VLT L36MM CT-1 1/2 CIR VCP346H

## (undated) DEVICE — SUTURE NONABSORBABLE MONOFILAMENT 4-0 RB-1 36 IN BLU PROLENE 8557H

## (undated) DEVICE — BASIC SINGLE BASIN BTC-LF: Brand: MEDLINE INDUSTRIES, INC.

## (undated) DEVICE — SUTURE PROL 7-0 L24IN NONABSORBABLE BLU L9.3MM CC 3/8 CIR M8704

## (undated) DEVICE — THORACIC CATHETER,STRAIGHT: Brand: ARGYLE

## (undated) DEVICE — DECANTER FLD 9IN ST BG FOR ASEP TRNSF OF FLD

## (undated) DEVICE — KIT VEN DRNGE VAC ACCSRY PERF VAVD STOCK W/ SPEC TRAP

## (undated) DEVICE — SUTURE VCRL + SZ 2-0 L27IN ABSRB CLR CT-1 1/2 CIR TAPERCUT VCP259H

## (undated) DEVICE — SUTURE VCRL + SZ 3-0 L27IN ABSRB WHT CT-1 1/2 CIR VCP258H

## (undated) DEVICE — BLANKET THER AD W24XL60IN FAB COVERING SUP SFT ULT THN LTWT

## (undated) DEVICE — SUTURE SILK PERMAHAND PRECUT 6 X 30 IN SZ 1 BLK BRAID A307H

## (undated) DEVICE — APPLIER CLP L9.375IN APER 2.1MM CLS L3.8MM 20 SM TI CLP

## (undated) DEVICE — PACK SUCT CATHETER 14FR OPN WHSTL W NO VLV

## (undated) DEVICE — SYSTEM ENDOSCP VES HARV W/ TOOL CANN SEAL SHT PRT BLNT TIP

## (undated) DEVICE — KIT BLWR MISTER 5P 15L W/ TBNG SET IRRIG MIST TO IMPROVE

## (undated) DEVICE — THORACIC CATHETER,RIGHT ANGLE: Brand: ARGYLE

## (undated) DEVICE — SET AUTOTRNS C175ML BOWL BTM OUTLT RESERVOIRXTRA

## (undated) DEVICE — CONNECTOR PERF 3/8X3/8X3/8IN EQL WYE

## (undated) DEVICE — Device: Brand: SUCTION TIP

## (undated) DEVICE — DRAPE SLUSH DISC W44XL66IN ST FOR RND BSIN HUSH SLUSH SYS

## (undated) DEVICE — SHUNT COR 6FR L1.8CM DIA2MM NONPROGRAMMABLE REG TAPR VLV
Type: IMPLANTABLE DEVICE | Status: NON-FUNCTIONAL
Removed: 2022-11-08

## (undated) DEVICE — AGENT HEMSTAT W2XL4IN OXIDIZED REGENERATED CELOS ABSRB SFT

## (undated) DEVICE — SUTURE SZ 7 L18IN NONABSORBABLE SIL CCS L48MM 1/2 CIR STRNM M655G

## (undated) DEVICE — TUBING INSUFFLATION SMK EVAC HI FLO SET PNEUMOCLEAR

## (undated) DEVICE — RETRACTOR SURG INSRT SUT HLD OCTOBASE

## (undated) DEVICE — FOGARTY - HYDRAGRIP SURGICAL - CLAMP INSERTS: Brand: FOGARTY SOFTJAW

## (undated) DEVICE — APPLIER LIG CLP M L11IN TI STR RNG HNDL FOR 20 CLP DISP

## (undated) DEVICE — SUTURE PROL SZ 3-0 L36IN NONABSORBABLE BLU L26MM SH 1/2 CIR 8522H

## (undated) DEVICE — SUTURE PROL SZ 4-0 L36IN NONABSORBABLE BLU L26MM SH 1/2 CIR 8521H

## (undated) DEVICE — CLIP LIG SM TI 6 BLU HNDL FOR OPN AND ENDOSCP SGL APPL

## (undated) DEVICE — DRAIN SURG SGL COLL PT TB FOR ATS BG OASIS

## (undated) DEVICE — OPEN HRT CDS LF

## (undated) DEVICE — SUTURE PROL 6-0 L24IN NONABSORBABLE BLU L13MM C-1 3/8 CIR M8726

## (undated) DEVICE — APPLICATOR MEDICATED 26 CC SOLUTION HI LT ORNG CHLORAPREP

## (undated) DEVICE — SURGICAL PROCEDURE PACK OXGNTR ST MARYS